# Patient Record
Sex: MALE | Race: WHITE | NOT HISPANIC OR LATINO | Employment: OTHER | URBAN - METROPOLITAN AREA
[De-identification: names, ages, dates, MRNs, and addresses within clinical notes are randomized per-mention and may not be internally consistent; named-entity substitution may affect disease eponyms.]

---

## 2017-01-01 ENCOUNTER — HOSPITAL ENCOUNTER (INPATIENT)
Facility: HOSPITAL | Age: 82
LOS: 1 days | Discharge: HOME | DRG: 388 | End: 2017-05-05
Attending: EMERGENCY MEDICINE | Admitting: SURGERY
Payer: MEDICARE

## 2017-01-01 ENCOUNTER — HOSPITAL ENCOUNTER (EMERGENCY)
Facility: HOSPITAL | Age: 82
Discharge: HOME | End: 2017-07-21
Attending: EMERGENCY MEDICINE
Payer: MEDICARE

## 2017-01-01 ENCOUNTER — PATIENT OUTREACH (OUTPATIENT)
Dept: ADMINISTRATIVE | Facility: CLINIC | Age: 82
End: 2017-01-01

## 2017-01-01 ENCOUNTER — HOSPITAL ENCOUNTER (INPATIENT)
Facility: HOSPITAL | Age: 82
LOS: 2 days | Discharge: HOME HEALTH | DRG: 200 | End: 2017-12-10
Attending: EMERGENCY MEDICINE | Admitting: UROLOGY
Payer: MEDICARE

## 2017-01-01 ENCOUNTER — HOSPITAL ENCOUNTER (EMERGENCY)
Facility: HOSPITAL | Age: 82
Discharge: HOME | End: 2017-12-24
Attending: EMERGENCY MEDICINE
Payer: MEDICARE

## 2017-01-01 ENCOUNTER — HOSPITAL ENCOUNTER (INPATIENT)
Facility: HOSPITAL | Age: 82
LOS: 3 days | Discharge: HOME | DRG: 388 | End: 2017-12-20
Attending: EMERGENCY MEDICINE | Admitting: STUDENT IN AN ORGANIZED HEALTH CARE EDUCATION/TRAINING PROGRAM
Payer: MEDICARE

## 2017-01-01 ENCOUNTER — HOSPITAL ENCOUNTER (EMERGENCY)
Facility: HOSPITAL | Age: 82
Discharge: HOME | End: 2017-05-02
Attending: EMERGENCY MEDICINE
Payer: MEDICARE

## 2017-01-01 ENCOUNTER — HOSPITAL ENCOUNTER (INPATIENT)
Facility: HOSPITAL | Age: 82
LOS: 5 days | Discharge: HOME HEALTH | DRG: 389 | End: 2018-01-02
Attending: EMERGENCY MEDICINE | Admitting: STUDENT IN AN ORGANIZED HEALTH CARE EDUCATION/TRAINING PROGRAM
Payer: MEDICARE

## 2017-01-01 ENCOUNTER — HOSPITAL ENCOUNTER (INPATIENT)
Facility: HOSPITAL | Age: 82
LOS: 1 days | Discharge: HOME | DRG: 389 | End: 2017-12-12
Attending: EMERGENCY MEDICINE | Admitting: SURGERY
Payer: MEDICARE

## 2017-01-01 VITALS
HEART RATE: 65 BPM | OXYGEN SATURATION: 96 % | DIASTOLIC BLOOD PRESSURE: 53 MMHG | BODY MASS INDEX: 14.19 KG/M2 | WEIGHT: 90.38 LBS | RESPIRATION RATE: 14 BRPM | HEIGHT: 67 IN | TEMPERATURE: 99 F | SYSTOLIC BLOOD PRESSURE: 97 MMHG

## 2017-01-01 VITALS
SYSTOLIC BLOOD PRESSURE: 132 MMHG | OXYGEN SATURATION: 99 % | RESPIRATION RATE: 18 BRPM | HEIGHT: 66 IN | DIASTOLIC BLOOD PRESSURE: 68 MMHG | HEART RATE: 95 BPM | TEMPERATURE: 99 F | BODY MASS INDEX: 17.36 KG/M2 | WEIGHT: 108 LBS

## 2017-01-01 VITALS
SYSTOLIC BLOOD PRESSURE: 92 MMHG | HEART RATE: 96 BPM | RESPIRATION RATE: 18 BRPM | WEIGHT: 91 LBS | BODY MASS INDEX: 14.28 KG/M2 | DIASTOLIC BLOOD PRESSURE: 54 MMHG | OXYGEN SATURATION: 93 % | TEMPERATURE: 98 F | HEIGHT: 67 IN

## 2017-01-01 VITALS
WEIGHT: 92.31 LBS | DIASTOLIC BLOOD PRESSURE: 70 MMHG | SYSTOLIC BLOOD PRESSURE: 133 MMHG | OXYGEN SATURATION: 98 % | TEMPERATURE: 98 F | BODY MASS INDEX: 13.99 KG/M2 | HEART RATE: 85 BPM | HEIGHT: 68 IN | RESPIRATION RATE: 18 BRPM

## 2017-01-01 VITALS
WEIGHT: 108 LBS | BODY MASS INDEX: 16.95 KG/M2 | HEART RATE: 92 BPM | SYSTOLIC BLOOD PRESSURE: 97 MMHG | OXYGEN SATURATION: 92 % | RESPIRATION RATE: 16 BRPM | HEIGHT: 67 IN | DIASTOLIC BLOOD PRESSURE: 62 MMHG | TEMPERATURE: 99 F

## 2017-01-01 VITALS
RESPIRATION RATE: 20 BRPM | WEIGHT: 91 LBS | TEMPERATURE: 97 F | HEIGHT: 67 IN | SYSTOLIC BLOOD PRESSURE: 142 MMHG | HEART RATE: 71 BPM | DIASTOLIC BLOOD PRESSURE: 72 MMHG | OXYGEN SATURATION: 98 % | BODY MASS INDEX: 14.28 KG/M2

## 2017-01-01 VITALS
RESPIRATION RATE: 16 BRPM | WEIGHT: 89.94 LBS | OXYGEN SATURATION: 95 % | HEART RATE: 70 BPM | BODY MASS INDEX: 14.12 KG/M2 | TEMPERATURE: 98 F | DIASTOLIC BLOOD PRESSURE: 57 MMHG | SYSTOLIC BLOOD PRESSURE: 93 MMHG | HEIGHT: 67 IN

## 2017-01-01 DIAGNOSIS — E87.5 HYPERKALEMIA: ICD-10-CM

## 2017-01-01 DIAGNOSIS — J20.9 COPD (CHRONIC OBSTRUCTIVE PULMONARY DISEASE) WITH ACUTE BRONCHITIS: ICD-10-CM

## 2017-01-01 DIAGNOSIS — J93.81 CHRONIC PNEUMOTHORAX: ICD-10-CM

## 2017-01-01 DIAGNOSIS — R11.10 VOMITING, INTRACTABILITY OF VOMITING NOT SPECIFIED, PRESENCE OF NAUSEA NOT SPECIFIED, UNSPECIFIED VOMITING TYPE: ICD-10-CM

## 2017-01-01 DIAGNOSIS — E86.0 DEHYDRATION: Primary | ICD-10-CM

## 2017-01-01 DIAGNOSIS — K94.19 ALTERED BOWEL ELIMINATION DUE TO INTESTINAL OSTOMY: ICD-10-CM

## 2017-01-01 DIAGNOSIS — K56.609 SMALL BOWEL OBSTRUCTION: ICD-10-CM

## 2017-01-01 DIAGNOSIS — Z93.8 STATUS POST CHEST TUBE PLACEMENT: ICD-10-CM

## 2017-01-01 DIAGNOSIS — J44.0 COPD (CHRONIC OBSTRUCTIVE PULMONARY DISEASE) WITH ACUTE BRONCHITIS: ICD-10-CM

## 2017-01-01 DIAGNOSIS — E87.20 METABOLIC ACIDOSIS: ICD-10-CM

## 2017-01-01 DIAGNOSIS — R10.84 GENERALIZED ABDOMINAL PAIN: ICD-10-CM

## 2017-01-01 DIAGNOSIS — R62.7 FAILURE TO THRIVE IN ADULT: ICD-10-CM

## 2017-01-01 DIAGNOSIS — R10.9 ABDOMINAL PAIN, UNSPECIFIED ABDOMINAL LOCATION: ICD-10-CM

## 2017-01-01 DIAGNOSIS — J93.81 CHRONIC PNEUMOTHORAX: Primary | ICD-10-CM

## 2017-01-01 DIAGNOSIS — K56.609 SBO (SMALL BOWEL OBSTRUCTION): Primary | ICD-10-CM

## 2017-01-01 DIAGNOSIS — J93.11 PRIMARY SPONTANEOUS PNEUMOTHORAX: ICD-10-CM

## 2017-01-01 DIAGNOSIS — J93.9 PNEUMOTHORAX ON RIGHT: ICD-10-CM

## 2017-01-01 DIAGNOSIS — Z93.9 HISTORY OF CREATION OF OSTOMY: Primary | ICD-10-CM

## 2017-01-01 DIAGNOSIS — R10.9 ABDOMINAL PAIN: ICD-10-CM

## 2017-01-01 DIAGNOSIS — Z90.49 S/P TOTAL COLECTOMY: ICD-10-CM

## 2017-01-01 DIAGNOSIS — R53.81 PHYSICAL DECONDITIONING: ICD-10-CM

## 2017-01-01 DIAGNOSIS — Z01.89 ENCOUNTER FOR IMAGING STUDY TO CONFIRM NASOGASTRIC (NG) TUBE PLACEMENT: ICD-10-CM

## 2017-01-01 DIAGNOSIS — D64.9 ANEMIA, UNSPECIFIED TYPE: ICD-10-CM

## 2017-01-01 DIAGNOSIS — J93.9 PNEUMOTHORAX: ICD-10-CM

## 2017-01-01 DIAGNOSIS — R07.9 CHEST PAIN: ICD-10-CM

## 2017-01-01 DIAGNOSIS — R10.9 ABDOMINAL CRAMPING: Primary | ICD-10-CM

## 2017-01-01 DIAGNOSIS — J93.0 TENSION PNEUMOTHORAX: ICD-10-CM

## 2017-01-01 DIAGNOSIS — K56.601 COMPLETE INTESTINAL OBSTRUCTION, UNSPECIFIED CAUSE: Primary | ICD-10-CM

## 2017-01-01 DIAGNOSIS — Z43.2 ATTENTION TO ILEOSTOMY: Primary | ICD-10-CM

## 2017-01-01 DIAGNOSIS — R05.9 COUGH: ICD-10-CM

## 2017-01-01 DIAGNOSIS — R53.1 WEAKNESS: ICD-10-CM

## 2017-01-01 LAB
25(OH)D3+25(OH)D2 SERPL-MCNC: 26 NG/ML
ALBUMIN SERPL BCP-MCNC: 2.6 G/DL
ALBUMIN SERPL BCP-MCNC: 2.7 G/DL
ALBUMIN SERPL BCP-MCNC: 2.8 G/DL
ALBUMIN SERPL BCP-MCNC: 3 G/DL
ALBUMIN SERPL BCP-MCNC: 3.7 G/DL
ALBUMIN SERPL BCP-MCNC: 3.7 G/DL
ALBUMIN SERPL BCP-MCNC: 3.8 G/DL
ALBUMIN SERPL BCP-MCNC: 4 G/DL
ALBUMIN SERPL BCP-MCNC: 4.1 G/DL
ALP SERPL-CCNC: 101 U/L
ALP SERPL-CCNC: 103 U/L
ALP SERPL-CCNC: 104 U/L
ALP SERPL-CCNC: 66 U/L
ALP SERPL-CCNC: 71 U/L
ALP SERPL-CCNC: 74 U/L
ALP SERPL-CCNC: 81 U/L
ALP SERPL-CCNC: 92 U/L
ALP SERPL-CCNC: 92 U/L
ALT SERPL W/O P-5'-P-CCNC: 12 U/L
ALT SERPL W/O P-5'-P-CCNC: 15 U/L
ALT SERPL W/O P-5'-P-CCNC: 17 U/L
ALT SERPL W/O P-5'-P-CCNC: 18 U/L
ALT SERPL W/O P-5'-P-CCNC: 19 U/L
ALT SERPL W/O P-5'-P-CCNC: 19 U/L
ALT SERPL W/O P-5'-P-CCNC: 20 U/L
ALT SERPL W/O P-5'-P-CCNC: 20 U/L
ALT SERPL W/O P-5'-P-CCNC: 35 U/L
ANION GAP SERPL CALC-SCNC: 10 MMOL/L
ANION GAP SERPL CALC-SCNC: 10 MMOL/L
ANION GAP SERPL CALC-SCNC: 12 MMOL/L
ANION GAP SERPL CALC-SCNC: 14 MMOL/L
ANION GAP SERPL CALC-SCNC: 17 MMOL/L
ANION GAP SERPL CALC-SCNC: 4 MMOL/L
ANION GAP SERPL CALC-SCNC: 4 MMOL/L
ANION GAP SERPL CALC-SCNC: 6 MMOL/L
ANION GAP SERPL CALC-SCNC: 7 MMOL/L
ANION GAP SERPL CALC-SCNC: 7 MMOL/L
ANION GAP SERPL CALC-SCNC: 8 MMOL/L
ANION GAP SERPL CALC-SCNC: 9 MMOL/L
AST SERPL-CCNC: 19 U/L
AST SERPL-CCNC: 19 U/L
AST SERPL-CCNC: 21 U/L
AST SERPL-CCNC: 23 U/L
AST SERPL-CCNC: 24 U/L
AST SERPL-CCNC: 27 U/L
AST SERPL-CCNC: 31 U/L
AST SERPL-CCNC: 32 U/L
AST SERPL-CCNC: 47 U/L
BACTERIA UR CULT: NORMAL
BACTERIA UR CULT: NORMAL
BASOPHILS # BLD AUTO: 0.01 K/UL
BASOPHILS # BLD AUTO: 0.02 K/UL
BASOPHILS # BLD AUTO: 0.03 K/UL
BASOPHILS # BLD AUTO: 0.04 K/UL
BASOPHILS NFR BLD: 0.1 %
BASOPHILS NFR BLD: 0.2 %
BASOPHILS NFR BLD: 0.3 %
BASOPHILS NFR BLD: 0.4 %
BASOPHILS NFR BLD: 0.4 %
BASOPHILS NFR BLD: 0.5 %
BILIRUB SERPL-MCNC: 0.3 MG/DL
BILIRUB SERPL-MCNC: 0.5 MG/DL
BILIRUB SERPL-MCNC: 0.6 MG/DL
BILIRUB SERPL-MCNC: 1 MG/DL
BILIRUB SERPL-MCNC: 1.1 MG/DL
BILIRUB UR QL STRIP: NEGATIVE
BNP SERPL-MCNC: 126 PG/ML
BUN SERPL-MCNC: 13 MG/DL
BUN SERPL-MCNC: 13 MG/DL
BUN SERPL-MCNC: 17 MG/DL
BUN SERPL-MCNC: 18 MG/DL
BUN SERPL-MCNC: 20 MG/DL
BUN SERPL-MCNC: 21 MG/DL
BUN SERPL-MCNC: 21 MG/DL
BUN SERPL-MCNC: 23 MG/DL
BUN SERPL-MCNC: 24 MG/DL
BUN SERPL-MCNC: 26 MG/DL
BUN SERPL-MCNC: 28 MG/DL
BUN SERPL-MCNC: 30 MG/DL
BUN SERPL-MCNC: 31 MG/DL
BUN SERPL-MCNC: 36 MG/DL
CALCIUM SERPL-MCNC: 10 MG/DL
CALCIUM SERPL-MCNC: 8.1 MG/DL
CALCIUM SERPL-MCNC: 8.2 MG/DL
CALCIUM SERPL-MCNC: 8.2 MG/DL
CALCIUM SERPL-MCNC: 8.3 MG/DL
CALCIUM SERPL-MCNC: 8.5 MG/DL
CALCIUM SERPL-MCNC: 8.6 MG/DL
CALCIUM SERPL-MCNC: 8.7 MG/DL
CALCIUM SERPL-MCNC: 8.8 MG/DL
CALCIUM SERPL-MCNC: 9.1 MG/DL
CALCIUM SERPL-MCNC: 9.2 MG/DL
CALCIUM SERPL-MCNC: 9.2 MG/DL
CALCIUM SERPL-MCNC: 9.7 MG/DL
CALCIUM SERPL-MCNC: 9.7 MG/DL
CALCIUM SERPL-MCNC: 9.8 MG/DL
CALCIUM SERPL-MCNC: 9.8 MG/DL
CALCIUM SERPL-MCNC: 9.9 MG/DL
CHLORIDE SERPL-SCNC: 100 MMOL/L
CHLORIDE SERPL-SCNC: 101 MMOL/L
CHLORIDE SERPL-SCNC: 102 MMOL/L
CHLORIDE SERPL-SCNC: 103 MMOL/L
CHLORIDE SERPL-SCNC: 105 MMOL/L
CHLORIDE SERPL-SCNC: 105 MMOL/L
CHLORIDE SERPL-SCNC: 106 MMOL/L
CHLORIDE SERPL-SCNC: 106 MMOL/L
CHLORIDE SERPL-SCNC: 108 MMOL/L
CHLORIDE SERPL-SCNC: 108 MMOL/L
CHLORIDE SERPL-SCNC: 109 MMOL/L
CHLORIDE SERPL-SCNC: 115 MMOL/L
CHLORIDE SERPL-SCNC: 115 MMOL/L
CHLORIDE SERPL-SCNC: 95 MMOL/L
CHLORIDE SERPL-SCNC: 95 MMOL/L
CHLORIDE SERPL-SCNC: 97 MMOL/L
CK SERPL-CCNC: 164 U/L
CLARITY UR: CLEAR
CO2 SERPL-SCNC: 13 MMOL/L
CO2 SERPL-SCNC: 16 MMOL/L
CO2 SERPL-SCNC: 16 MMOL/L
CO2 SERPL-SCNC: 17 MMOL/L
CO2 SERPL-SCNC: 19 MMOL/L
CO2 SERPL-SCNC: 20 MMOL/L
CO2 SERPL-SCNC: 21 MMOL/L
CO2 SERPL-SCNC: 21 MMOL/L
CO2 SERPL-SCNC: 23 MMOL/L
CO2 SERPL-SCNC: 23 MMOL/L
CO2 SERPL-SCNC: 24 MMOL/L
CO2 SERPL-SCNC: 27 MMOL/L
CO2 SERPL-SCNC: 28 MMOL/L
COLOR UR: YELLOW
CREAT SERPL-MCNC: 0.7 MG/DL
CREAT SERPL-MCNC: 0.8 MG/DL
CREAT SERPL-MCNC: 0.9 MG/DL
CREAT SERPL-MCNC: 1 MG/DL
CREAT SERPL-MCNC: 1.1 MG/DL
CREAT SERPL-MCNC: 1.2 MG/DL
CREAT SERPL-MCNC: 1.2 MG/DL
CREAT SERPL-MCNC: 1.3 MG/DL
CREAT SERPL-MCNC: 1.4 MG/DL
CREAT SERPL-MCNC: 1.4 MG/DL
DIFFERENTIAL METHOD: ABNORMAL
EOSINOPHIL # BLD AUTO: 0 K/UL
EOSINOPHIL # BLD AUTO: 0.1 K/UL
EOSINOPHIL # BLD AUTO: 0.2 K/UL
EOSINOPHIL NFR BLD: 0 %
EOSINOPHIL NFR BLD: 0.1 %
EOSINOPHIL NFR BLD: 0.1 %
EOSINOPHIL NFR BLD: 0.2 %
EOSINOPHIL NFR BLD: 0.8 %
EOSINOPHIL NFR BLD: 1.4 %
EOSINOPHIL NFR BLD: 1.5 %
ERYTHROCYTE [DISTWIDTH] IN BLOOD BY AUTOMATED COUNT: 13 %
ERYTHROCYTE [DISTWIDTH] IN BLOOD BY AUTOMATED COUNT: 13.1 %
ERYTHROCYTE [DISTWIDTH] IN BLOOD BY AUTOMATED COUNT: 13.1 %
ERYTHROCYTE [DISTWIDTH] IN BLOOD BY AUTOMATED COUNT: 13.2 %
ERYTHROCYTE [DISTWIDTH] IN BLOOD BY AUTOMATED COUNT: 13.6 %
ERYTHROCYTE [DISTWIDTH] IN BLOOD BY AUTOMATED COUNT: 13.7 %
ERYTHROCYTE [DISTWIDTH] IN BLOOD BY AUTOMATED COUNT: 13.8 %
EST. GFR  (AFRICAN AMERICAN): 54 ML/MIN/1.73 M^2
EST. GFR  (AFRICAN AMERICAN): 54.9 ML/MIN/1.73 M^2
EST. GFR  (AFRICAN AMERICAN): 60 ML/MIN/1.73 M^2
EST. GFR  (AFRICAN AMERICAN): >60 ML/MIN/1.73 M^2
EST. GFR  (NON AFRICAN AMERICAN): 47 ML/MIN/1.73 M^2
EST. GFR  (NON AFRICAN AMERICAN): 47.4 ML/MIN/1.73 M^2
EST. GFR  (NON AFRICAN AMERICAN): 52 ML/MIN/1.73 M^2
EST. GFR  (NON AFRICAN AMERICAN): 57 ML/MIN/1.73 M^2
EST. GFR  (NON AFRICAN AMERICAN): 57.2 ML/MIN/1.73 M^2
EST. GFR  (NON AFRICAN AMERICAN): >60 ML/MIN/1.73 M^2
FERRITIN SERPL-MCNC: 193 NG/ML
FOLATE SERPL-MCNC: 15.3 NG/ML
GLUCOSE SERPL-MCNC: 100 MG/DL
GLUCOSE SERPL-MCNC: 100 MG/DL
GLUCOSE SERPL-MCNC: 101 MG/DL
GLUCOSE SERPL-MCNC: 106 MG/DL
GLUCOSE SERPL-MCNC: 109 MG/DL
GLUCOSE SERPL-MCNC: 109 MG/DL
GLUCOSE SERPL-MCNC: 113 MG/DL
GLUCOSE SERPL-MCNC: 118 MG/DL
GLUCOSE SERPL-MCNC: 123 MG/DL
GLUCOSE SERPL-MCNC: 129 MG/DL
GLUCOSE SERPL-MCNC: 139 MG/DL
GLUCOSE SERPL-MCNC: 164 MG/DL
GLUCOSE SERPL-MCNC: 184 MG/DL
GLUCOSE SERPL-MCNC: 83 MG/DL
GLUCOSE SERPL-MCNC: 87 MG/DL
GLUCOSE SERPL-MCNC: 89 MG/DL
GLUCOSE SERPL-MCNC: 90 MG/DL
GLUCOSE SERPL-MCNC: 93 MG/DL
GLUCOSE SERPL-MCNC: 97 MG/DL
GLUCOSE UR QL STRIP: NEGATIVE
HCT VFR BLD AUTO: 30.4 %
HCT VFR BLD AUTO: 31.1 %
HCT VFR BLD AUTO: 32.9 %
HCT VFR BLD AUTO: 33.2 %
HCT VFR BLD AUTO: 33.9 %
HCT VFR BLD AUTO: 34.3 %
HCT VFR BLD AUTO: 34.4 %
HCT VFR BLD AUTO: 36.6 %
HCT VFR BLD AUTO: 37.2 %
HCT VFR BLD AUTO: 38 %
HCT VFR BLD AUTO: 38.8 %
HCT VFR BLD AUTO: 39.8 %
HCT VFR BLD AUTO: 41.1 %
HCT VFR BLD AUTO: 41.2 %
HGB BLD-MCNC: 10.3 G/DL
HGB BLD-MCNC: 10.8 G/DL
HGB BLD-MCNC: 11.1 G/DL
HGB BLD-MCNC: 11.1 G/DL
HGB BLD-MCNC: 11.3 G/DL
HGB BLD-MCNC: 11.5 G/DL
HGB BLD-MCNC: 12.2 G/DL
HGB BLD-MCNC: 12.9 G/DL
HGB BLD-MCNC: 13.1 G/DL
HGB BLD-MCNC: 13.1 G/DL
HGB BLD-MCNC: 13.5 G/DL
HGB BLD-MCNC: 13.7 G/DL
HGB BLD-MCNC: 13.8 G/DL
HGB BLD-MCNC: 9.8 G/DL
HGB UR QL STRIP: ABNORMAL
HGB UR QL STRIP: ABNORMAL
HGB UR QL STRIP: NEGATIVE
INR PPP: 1
KETONES UR QL STRIP: ABNORMAL
KETONES UR QL STRIP: NEGATIVE
KETONES UR QL STRIP: NEGATIVE
LACTATE SERPL-SCNC: 1.3 MMOL/L
LACTATE SERPL-SCNC: 2.2 MMOL/L
LEUKOCYTE ESTERASE UR QL STRIP: NEGATIVE
LIPASE SERPL-CCNC: 13 U/L
LIPASE SERPL-CCNC: 19 U/L
LIPASE SERPL-CCNC: 27 U/L
LYMPHOCYTES # BLD AUTO: 1 K/UL
LYMPHOCYTES # BLD AUTO: 1.3 K/UL
LYMPHOCYTES # BLD AUTO: 1.6 K/UL
LYMPHOCYTES # BLD AUTO: 1.9 K/UL
LYMPHOCYTES # BLD AUTO: 2 K/UL
LYMPHOCYTES # BLD AUTO: 2 K/UL
LYMPHOCYTES # BLD AUTO: 2.5 K/UL
LYMPHOCYTES # BLD AUTO: 2.6 K/UL
LYMPHOCYTES # BLD AUTO: 2.7 K/UL
LYMPHOCYTES # BLD AUTO: 2.8 K/UL
LYMPHOCYTES # BLD AUTO: 3.1 K/UL
LYMPHOCYTES # BLD AUTO: 3.4 K/UL
LYMPHOCYTES NFR BLD: 10.4 %
LYMPHOCYTES NFR BLD: 10.8 %
LYMPHOCYTES NFR BLD: 20.6 %
LYMPHOCYTES NFR BLD: 23.5 %
LYMPHOCYTES NFR BLD: 23.5 %
LYMPHOCYTES NFR BLD: 25.5 %
LYMPHOCYTES NFR BLD: 27 %
LYMPHOCYTES NFR BLD: 27.4 %
LYMPHOCYTES NFR BLD: 27.5 %
LYMPHOCYTES NFR BLD: 30.4 %
LYMPHOCYTES NFR BLD: 5.8 %
LYMPHOCYTES NFR BLD: 7.2 %
MAGNESIUM SERPL-MCNC: 1.7 MG/DL
MAGNESIUM SERPL-MCNC: 1.8 MG/DL
MAGNESIUM SERPL-MCNC: 1.9 MG/DL
MAGNESIUM SERPL-MCNC: 2.1 MG/DL
MCH RBC QN AUTO: 30.5 PG
MCH RBC QN AUTO: 30.9 PG
MCH RBC QN AUTO: 31 PG
MCH RBC QN AUTO: 31.2 PG
MCH RBC QN AUTO: 31.2 PG
MCH RBC QN AUTO: 31.3 PG
MCH RBC QN AUTO: 31.4 PG
MCH RBC QN AUTO: 31.4 PG
MCH RBC QN AUTO: 31.5 PG
MCH RBC QN AUTO: 31.6 PG
MCH RBC QN AUTO: 31.7 PG
MCH RBC QN AUTO: 32.2 PG
MCHC RBC AUTO-ENTMCNC: 32.2 G/DL
MCHC RBC AUTO-ENTMCNC: 32.7 G/DL
MCHC RBC AUTO-ENTMCNC: 32.8 G/DL
MCHC RBC AUTO-ENTMCNC: 32.9 G/DL
MCHC RBC AUTO-ENTMCNC: 33.1 G/DL
MCHC RBC AUTO-ENTMCNC: 33.3 G/DL
MCHC RBC AUTO-ENTMCNC: 33.3 G/DL
MCHC RBC AUTO-ENTMCNC: 33.4 %
MCHC RBC AUTO-ENTMCNC: 33.4 G/DL
MCHC RBC AUTO-ENTMCNC: 33.5 G/DL
MCHC RBC AUTO-ENTMCNC: 33.8 G/DL
MCHC RBC AUTO-ENTMCNC: 33.9 G/DL
MCHC RBC AUTO-ENTMCNC: 33.9 G/DL
MCHC RBC AUTO-ENTMCNC: 35.2 %
MCV RBC AUTO: 91 FL
MCV RBC AUTO: 93 FL
MCV RBC AUTO: 94 FL
MCV RBC AUTO: 95 FL
MONOCYTES # BLD AUTO: 0.8 K/UL
MONOCYTES # BLD AUTO: 0.9 K/UL
MONOCYTES # BLD AUTO: 1 K/UL
MONOCYTES # BLD AUTO: 1.1 K/UL
MONOCYTES # BLD AUTO: 1.2 K/UL
MONOCYTES # BLD AUTO: 1.3 K/UL
MONOCYTES # BLD AUTO: 1.5 K/UL
MONOCYTES NFR BLD: 10.4 %
MONOCYTES NFR BLD: 10.9 %
MONOCYTES NFR BLD: 13.5 %
MONOCYTES NFR BLD: 4.4 %
MONOCYTES NFR BLD: 6.3 %
MONOCYTES NFR BLD: 6.8 %
MONOCYTES NFR BLD: 7.3 %
MONOCYTES NFR BLD: 8.4 %
MONOCYTES NFR BLD: 8.4 %
MONOCYTES NFR BLD: 9 %
MONOCYTES NFR BLD: 9.2 %
MONOCYTES NFR BLD: 9.8 %
NEUTROPHILS # BLD AUTO: 15.7 K/UL
NEUTROPHILS # BLD AUTO: 16.2 K/UL
NEUTROPHILS # BLD AUTO: 19 K/UL
NEUTROPHILS # BLD AUTO: 5.3 K/UL
NEUTROPHILS # BLD AUTO: 5.4 K/UL
NEUTROPHILS # BLD AUTO: 5.8 K/UL
NEUTROPHILS # BLD AUTO: 6.2 K/UL
NEUTROPHILS # BLD AUTO: 6.3 K/UL
NEUTROPHILS # BLD AUTO: 7 K/UL
NEUTROPHILS # BLD AUTO: 7.5 K/UL
NEUTROPHILS # BLD AUTO: 7.8 K/UL
NEUTROPHILS # BLD AUTO: 9.9 K/UL
NEUTROPHILS NFR BLD: 57.7 %
NEUTROPHILS NFR BLD: 60.2 %
NEUTROPHILS NFR BLD: 61.5 %
NEUTROPHILS NFR BLD: 62.4 %
NEUTROPHILS NFR BLD: 63.5 %
NEUTROPHILS NFR BLD: 65.3 %
NEUTROPHILS NFR BLD: 65.6 %
NEUTROPHILS NFR BLD: 65.8 %
NEUTROPHILS NFR BLD: 81.5 %
NEUTROPHILS NFR BLD: 82.9 %
NEUTROPHILS NFR BLD: 85.5 %
NEUTROPHILS NFR BLD: 89.3 %
NITRITE UR QL STRIP: NEGATIVE
OSMOLALITY UR: 634 MOSM/KG
PH UR STRIP: 5 [PH] (ref 5–8)
PH UR STRIP: 6 [PH] (ref 5–8)
PH UR STRIP: 6 [PH] (ref 5–8)
PHOSPHATE SERPL-MCNC: 2.8 MG/DL
PHOSPHATE SERPL-MCNC: 3.1 MG/DL
PHOSPHATE SERPL-MCNC: 3.3 MG/DL
PHOSPHATE SERPL-MCNC: 3.4 MG/DL
PHOSPHATE SERPL-MCNC: 3.9 MG/DL
PHOSPHATE SERPL-MCNC: 4 MG/DL
PLATELET # BLD AUTO: 235 K/UL
PLATELET # BLD AUTO: 257 K/UL
PLATELET # BLD AUTO: 268 K/UL
PLATELET # BLD AUTO: 277 K/UL
PLATELET # BLD AUTO: 284 K/UL
PLATELET # BLD AUTO: 291 K/UL
PLATELET # BLD AUTO: 292 K/UL
PLATELET # BLD AUTO: 299 K/UL
PLATELET # BLD AUTO: 326 K/UL
PLATELET # BLD AUTO: 338 K/UL
PLATELET # BLD AUTO: 357 K/UL
PLATELET # BLD AUTO: 358 K/UL
PLATELET # BLD AUTO: 385 K/UL
PLATELET # BLD AUTO: 394 K/UL
PMV BLD AUTO: 10.1 FL
PMV BLD AUTO: 10.1 FL
PMV BLD AUTO: 10.4 FL
PMV BLD AUTO: 10.5 FL
PMV BLD AUTO: 10.8 FL
PMV BLD AUTO: 10.8 FL
PMV BLD AUTO: 11 FL
PMV BLD AUTO: 11.1 FL
PMV BLD AUTO: 11.2 FL
PMV BLD AUTO: 11.2 FL
POCT GLUCOSE: 149 MG/DL (ref 70–110)
POCT GLUCOSE: 84 MG/DL (ref 70–110)
POTASSIUM SERPL-SCNC: 3.6 MMOL/L
POTASSIUM SERPL-SCNC: 3.7 MMOL/L
POTASSIUM SERPL-SCNC: 3.8 MMOL/L
POTASSIUM SERPL-SCNC: 3.9 MMOL/L
POTASSIUM SERPL-SCNC: 4.1 MMOL/L
POTASSIUM SERPL-SCNC: 4.3 MMOL/L
POTASSIUM SERPL-SCNC: 4.3 MMOL/L
POTASSIUM SERPL-SCNC: 4.4 MMOL/L
POTASSIUM SERPL-SCNC: 4.5 MMOL/L
POTASSIUM SERPL-SCNC: 5.3 MMOL/L
POTASSIUM SERPL-SCNC: 5.4 MMOL/L
PREALB SERPL-MCNC: 11 MG/DL
PREALB SERPL-MCNC: 11 MG/DL
PREALB SERPL-MCNC: 14 MG/DL
PROT SERPL-MCNC: 5.2 G/DL
PROT SERPL-MCNC: 5.8 G/DL
PROT SERPL-MCNC: 6.1 G/DL
PROT SERPL-MCNC: 6.5 G/DL
PROT SERPL-MCNC: 7.7 G/DL
PROT SERPL-MCNC: 7.7 G/DL
PROT SERPL-MCNC: 7.8 G/DL
PROT SERPL-MCNC: 8.3 G/DL
PROT SERPL-MCNC: 8.5 G/DL
PROT UR QL STRIP: ABNORMAL
PROT UR QL STRIP: NEGATIVE
PROT UR QL STRIP: NEGATIVE
PROTHROMBIN TIME: 10.8 SEC
RBC # BLD AUTO: 3.21 M/UL
RBC # BLD AUTO: 3.28 M/UL
RBC # BLD AUTO: 3.45 M/UL
RBC # BLD AUTO: 3.56 M/UL
RBC # BLD AUTO: 3.56 M/UL
RBC # BLD AUTO: 3.66 M/UL
RBC # BLD AUTO: 3.71 M/UL
RBC # BLD AUTO: 3.87 M/UL
RBC # BLD AUTO: 4.07 M/UL
RBC # BLD AUTO: 4.11 M/UL
RBC # BLD AUTO: 4.14 M/UL
RBC # BLD AUTO: 4.26 M/UL
RBC # BLD AUTO: 4.33 M/UL
RBC # BLD AUTO: 4.37 M/UL
SODIUM SERPL-SCNC: 123 MMOL/L
SODIUM SERPL-SCNC: 126 MMOL/L
SODIUM SERPL-SCNC: 127 MMOL/L
SODIUM SERPL-SCNC: 128 MMOL/L
SODIUM SERPL-SCNC: 131 MMOL/L
SODIUM SERPL-SCNC: 133 MMOL/L
SODIUM SERPL-SCNC: 133 MMOL/L
SODIUM SERPL-SCNC: 136 MMOL/L
SODIUM SERPL-SCNC: 137 MMOL/L
SODIUM SERPL-SCNC: 137 MMOL/L
SODIUM SERPL-SCNC: 138 MMOL/L
SODIUM SERPL-SCNC: 138 MMOL/L
SODIUM SERPL-SCNC: 139 MMOL/L
SODIUM SERPL-SCNC: 143 MMOL/L
SODIUM SERPL-SCNC: 145 MMOL/L
SP GR UR STRIP: 1.01 (ref 1–1.03)
SP GR UR STRIP: >=1.03 (ref 1–1.03)
SP GR UR STRIP: >=1.03 (ref 1–1.03)
TRIGL SERPL-MCNC: 46 MG/DL
TROPONIN I SERPL DL<=0.01 NG/ML-MCNC: 0.02 NG/ML
TSH SERPL DL<=0.005 MIU/L-ACNC: 0.59 UIU/ML
URN SPEC COLLECT METH UR: ABNORMAL
URN SPEC COLLECT METH UR: ABNORMAL
URN SPEC COLLECT METH UR: NORMAL
UROBILINOGEN UR STRIP-ACNC: NEGATIVE EU/DL
VIT B12 SERPL-MCNC: 569 PG/ML
WBC # BLD AUTO: 10.13 K/UL
WBC # BLD AUTO: 10.62 K/UL
WBC # BLD AUTO: 11.6 K/UL
WBC # BLD AUTO: 11.95 K/UL
WBC # BLD AUTO: 12.13 K/UL
WBC # BLD AUTO: 12.18 K/UL
WBC # BLD AUTO: 17.53 K/UL
WBC # BLD AUTO: 19.05 K/UL
WBC # BLD AUTO: 19.55 K/UL
WBC # BLD AUTO: 22.21 K/UL
WBC # BLD AUTO: 8.56 K/UL
WBC # BLD AUTO: 9.19 K/UL
WBC # BLD AUTO: 9.43 K/UL
WBC # BLD AUTO: 9.6 K/UL

## 2017-01-01 PROCEDURE — 25000003 PHARM REV CODE 250: Performed by: SURGERY

## 2017-01-01 PROCEDURE — 99232 SBSQ HOSP IP/OBS MODERATE 35: CPT | Mod: ,,, | Performed by: SURGERY

## 2017-01-01 PROCEDURE — 11000001 HC ACUTE MED/SURG PRIVATE ROOM

## 2017-01-01 PROCEDURE — 99283 EMERGENCY DEPT VISIT LOW MDM: CPT

## 2017-01-01 PROCEDURE — 85025 COMPLETE CBC W/AUTO DIFF WBC: CPT

## 2017-01-01 PROCEDURE — 85025 COMPLETE CBC W/AUTO DIFF WBC: CPT | Mod: 91

## 2017-01-01 PROCEDURE — 36415 COLL VENOUS BLD VENIPUNCTURE: CPT

## 2017-01-01 PROCEDURE — 80053 COMPREHEN METABOLIC PANEL: CPT

## 2017-01-01 PROCEDURE — 63600175 PHARM REV CODE 636 W HCPCS: Performed by: SURGERY

## 2017-01-01 PROCEDURE — 96361 HYDRATE IV INFUSION ADD-ON: CPT

## 2017-01-01 PROCEDURE — S0028 INJECTION, FAMOTIDINE, 20 MG: HCPCS | Performed by: EMERGENCY MEDICINE

## 2017-01-01 PROCEDURE — 99292 CRITICAL CARE ADDL 30 MIN: CPT

## 2017-01-01 PROCEDURE — 96375 TX/PRO/DX INJ NEW DRUG ADDON: CPT

## 2017-01-01 PROCEDURE — 83880 ASSAY OF NATRIURETIC PEPTIDE: CPT

## 2017-01-01 PROCEDURE — 25000003 PHARM REV CODE 250: Performed by: STUDENT IN AN ORGANIZED HEALTH CARE EDUCATION/TRAINING PROGRAM

## 2017-01-01 PROCEDURE — 94761 N-INVAS EAR/PLS OXIMETRY MLT: CPT

## 2017-01-01 PROCEDURE — 93010 ELECTROCARDIOGRAM REPORT: CPT | Mod: ,,, | Performed by: INTERNAL MEDICINE

## 2017-01-01 PROCEDURE — 93005 ELECTROCARDIOGRAM TRACING: CPT

## 2017-01-01 PROCEDURE — 80048 BASIC METABOLIC PNL TOTAL CA: CPT

## 2017-01-01 PROCEDURE — A4216 STERILE WATER/SALINE, 10 ML: HCPCS | Performed by: STUDENT IN AN ORGANIZED HEALTH CARE EDUCATION/TRAINING PROGRAM

## 2017-01-01 PROCEDURE — 96372 THER/PROPH/DIAG INJ SC/IM: CPT

## 2017-01-01 PROCEDURE — 25000003 PHARM REV CODE 250: Performed by: INTERNAL MEDICINE

## 2017-01-01 PROCEDURE — 83605 ASSAY OF LACTIC ACID: CPT

## 2017-01-01 PROCEDURE — 25000003 PHARM REV CODE 250: Performed by: EMERGENCY MEDICINE

## 2017-01-01 PROCEDURE — 63600175 PHARM REV CODE 636 W HCPCS: Performed by: EMERGENCY MEDICINE

## 2017-01-01 PROCEDURE — 84484 ASSAY OF TROPONIN QUANT: CPT

## 2017-01-01 PROCEDURE — 27000221 HC OXYGEN, UP TO 24 HOURS

## 2017-01-01 PROCEDURE — 99284 EMERGENCY DEPT VISIT MOD MDM: CPT | Mod: 25

## 2017-01-01 PROCEDURE — 3E0336Z INTRODUCTION OF NUTRITIONAL SUBSTANCE INTO PERIPHERAL VEIN, PERCUTANEOUS APPROACH: ICD-10-PCS | Performed by: STUDENT IN AN ORGANIZED HEALTH CARE EDUCATION/TRAINING PROGRAM

## 2017-01-01 PROCEDURE — 99223 1ST HOSP IP/OBS HIGH 75: CPT | Mod: AI,GC,, | Performed by: SURGERY

## 2017-01-01 PROCEDURE — C9113 INJ PANTOPRAZOLE SODIUM, VIA: HCPCS | Performed by: EMERGENCY MEDICINE

## 2017-01-01 PROCEDURE — 81003 URINALYSIS AUTO W/O SCOPE: CPT

## 2017-01-01 PROCEDURE — 84100 ASSAY OF PHOSPHORUS: CPT

## 2017-01-01 PROCEDURE — 99222 1ST HOSP IP/OBS MODERATE 55: CPT | Mod: ,,, | Performed by: SURGERY

## 2017-01-01 PROCEDURE — 25000003 PHARM REV CODE 250

## 2017-01-01 PROCEDURE — 63600175 PHARM REV CODE 636 W HCPCS: Performed by: RADIOLOGY

## 2017-01-01 PROCEDURE — 02HV33Z INSERTION OF INFUSION DEVICE INTO SUPERIOR VENA CAVA, PERCUTANEOUS APPROACH: ICD-10-PCS | Performed by: STUDENT IN AN ORGANIZED HEALTH CARE EDUCATION/TRAINING PROGRAM

## 2017-01-01 PROCEDURE — 0W9930Z DRAINAGE OF RIGHT PLEURAL CAVITY WITH DRAINAGE DEVICE, PERCUTANEOUS APPROACH: ICD-10-PCS | Performed by: EMERGENCY MEDICINE

## 2017-01-01 PROCEDURE — 99900035 HC TECH TIME PER 15 MIN (STAT)

## 2017-01-01 PROCEDURE — 82607 VITAMIN B-12: CPT

## 2017-01-01 PROCEDURE — 99285 EMERGENCY DEPT VISIT HI MDM: CPT | Mod: 25

## 2017-01-01 PROCEDURE — 0W9930Z DRAINAGE OF RIGHT PLEURAL CAVITY WITH DRAINAGE DEVICE, PERCUTANEOUS APPROACH: ICD-10-PCS | Performed by: RADIOLOGY

## 2017-01-01 PROCEDURE — 96366 THER/PROPH/DIAG IV INF ADDON: CPT

## 2017-01-01 PROCEDURE — 96374 THER/PROPH/DIAG INJ IV PUSH: CPT

## 2017-01-01 PROCEDURE — 25000003 PHARM REV CODE 250: Performed by: THORACIC SURGERY (CARDIOTHORACIC VASCULAR SURGERY)

## 2017-01-01 PROCEDURE — 83735 ASSAY OF MAGNESIUM: CPT

## 2017-01-01 PROCEDURE — 99291 CRITICAL CARE FIRST HOUR: CPT | Mod: 25

## 2017-01-01 PROCEDURE — 84134 ASSAY OF PREALBUMIN: CPT

## 2017-01-01 PROCEDURE — 82550 ASSAY OF CK (CPK): CPT

## 2017-01-01 PROCEDURE — 99285 EMERGENCY DEPT VISIT HI MDM: CPT | Mod: ,,, | Performed by: PHYSICIAN ASSISTANT

## 2017-01-01 PROCEDURE — 63600175 PHARM REV CODE 636 W HCPCS: Performed by: STUDENT IN AN ORGANIZED HEALTH CARE EDUCATION/TRAINING PROGRAM

## 2017-01-01 PROCEDURE — 63600175 PHARM REV CODE 636 W HCPCS

## 2017-01-01 PROCEDURE — 25000003 PHARM REV CODE 250: Performed by: PHYSICIAN ASSISTANT

## 2017-01-01 PROCEDURE — C9113 INJ PANTOPRAZOLE SODIUM, VIA: HCPCS | Performed by: SURGERY

## 2017-01-01 PROCEDURE — 25000003 PHARM REV CODE 250: Performed by: HOSPITALIST

## 2017-01-01 PROCEDURE — 25500020 PHARM REV CODE 255: Performed by: EMERGENCY MEDICINE

## 2017-01-01 PROCEDURE — 85610 PROTHROMBIN TIME: CPT

## 2017-01-01 PROCEDURE — 83690 ASSAY OF LIPASE: CPT

## 2017-01-01 PROCEDURE — 87086 URINE CULTURE/COLONY COUNT: CPT

## 2017-01-01 PROCEDURE — 84478 ASSAY OF TRIGLYCERIDES: CPT

## 2017-01-01 PROCEDURE — 97802 MEDICAL NUTRITION INDIV IN: CPT

## 2017-01-01 PROCEDURE — 96376 TX/PRO/DX INJ SAME DRUG ADON: CPT

## 2017-01-01 PROCEDURE — 82306 VITAMIN D 25 HYDROXY: CPT

## 2017-01-01 PROCEDURE — 85027 COMPLETE CBC AUTOMATED: CPT

## 2017-01-01 PROCEDURE — 82728 ASSAY OF FERRITIN: CPT

## 2017-01-01 PROCEDURE — 99223 1ST HOSP IP/OBS HIGH 75: CPT | Mod: ,,, | Performed by: STUDENT IN AN ORGANIZED HEALTH CARE EDUCATION/TRAINING PROGRAM

## 2017-01-01 PROCEDURE — 32551 INSERTION OF CHEST TUBE: CPT

## 2017-01-01 PROCEDURE — 96365 THER/PROPH/DIAG IV INF INIT: CPT

## 2017-01-01 PROCEDURE — 99232 SBSQ HOSP IP/OBS MODERATE 35: CPT | Mod: ,,, | Performed by: STUDENT IN AN ORGANIZED HEALTH CARE EDUCATION/TRAINING PROGRAM

## 2017-01-01 PROCEDURE — 99285 EMERGENCY DEPT VISIT HI MDM: CPT

## 2017-01-01 PROCEDURE — 96360 HYDRATION IV INFUSION INIT: CPT

## 2017-01-01 PROCEDURE — S5010 5% DEXTROSE AND 0.45% SALINE: HCPCS | Performed by: INTERNAL MEDICINE

## 2017-01-01 PROCEDURE — B4185 PARENTERAL SOL 10 GM LIPIDS: HCPCS | Performed by: SURGERY

## 2017-01-01 PROCEDURE — 99223 1ST HOSP IP/OBS HIGH 75: CPT | Mod: ,,, | Performed by: SURGERY

## 2017-01-01 PROCEDURE — 63600175 PHARM REV CODE 636 W HCPCS: Performed by: PHYSICIAN ASSISTANT

## 2017-01-01 PROCEDURE — 0W9930Z DRAINAGE OF RIGHT PLEURAL CAVITY WITH DRAINAGE DEVICE, PERCUTANEOUS APPROACH: ICD-10-PCS | Performed by: THORACIC SURGERY (CARDIOTHORACIC VASCULAR SURGERY)

## 2017-01-01 PROCEDURE — 25500020 PHARM REV CODE 255: Performed by: SURGERY

## 2017-01-01 PROCEDURE — 82962 GLUCOSE BLOOD TEST: CPT

## 2017-01-01 PROCEDURE — 82746 ASSAY OF FOLIC ACID SERUM: CPT

## 2017-01-01 PROCEDURE — 80053 COMPREHEN METABOLIC PANEL: CPT | Mod: 91

## 2017-01-01 PROCEDURE — 84443 ASSAY THYROID STIM HORMONE: CPT

## 2017-01-01 PROCEDURE — 83935 ASSAY OF URINE OSMOLALITY: CPT

## 2017-01-01 PROCEDURE — S5010 5% DEXTROSE AND 0.45% SALINE: HCPCS | Performed by: EMERGENCY MEDICINE

## 2017-01-01 RX ORDER — MORPHINE SULFATE 10 MG/ML
4 INJECTION INTRAMUSCULAR; INTRAVENOUS; SUBCUTANEOUS EVERY 4 HOURS PRN
Status: DISCONTINUED | OUTPATIENT
Start: 2017-01-01 | End: 2017-01-01

## 2017-01-01 RX ORDER — MORPHINE SULFATE 2 MG/ML
2 INJECTION, SOLUTION INTRAMUSCULAR; INTRAVENOUS EVERY 6 HOURS PRN
Status: DISCONTINUED | OUTPATIENT
Start: 2017-01-01 | End: 2017-01-01 | Stop reason: HOSPADM

## 2017-01-01 RX ORDER — MORPHINE SULFATE 2 MG/ML
4 INJECTION, SOLUTION INTRAMUSCULAR; INTRAVENOUS
Status: COMPLETED | OUTPATIENT
Start: 2017-01-01 | End: 2017-01-01

## 2017-01-01 RX ORDER — HEPARIN SODIUM 5000 [USP'U]/ML
5000 INJECTION, SOLUTION INTRAVENOUS; SUBCUTANEOUS EVERY 8 HOURS
Status: DISCONTINUED | OUTPATIENT
Start: 2017-01-01 | End: 2017-01-01 | Stop reason: HOSPADM

## 2017-01-01 RX ORDER — MIDAZOLAM HYDROCHLORIDE 1 MG/ML
INJECTION INTRAMUSCULAR; INTRAVENOUS
Status: DISPENSED
Start: 2017-01-01 | End: 2017-01-01

## 2017-01-01 RX ORDER — SODIUM CHLORIDE 9 MG/ML
1000 INJECTION, SOLUTION INTRAVENOUS
Status: COMPLETED | OUTPATIENT
Start: 2017-01-01 | End: 2017-01-01

## 2017-01-01 RX ORDER — HEPARIN SODIUM 5000 [USP'U]/ML
5000 INJECTION, SOLUTION INTRAVENOUS; SUBCUTANEOUS EVERY 8 HOURS
Status: DISCONTINUED | OUTPATIENT
Start: 2017-01-01 | End: 2018-01-01 | Stop reason: HOSPADM

## 2017-01-01 RX ORDER — FENTANYL CITRATE 50 UG/ML
50 INJECTION, SOLUTION INTRAMUSCULAR; INTRAVENOUS
Status: COMPLETED | OUTPATIENT
Start: 2017-01-01 | End: 2017-01-01

## 2017-01-01 RX ORDER — PROCHLORPERAZINE EDISYLATE 5 MG/ML
5 INJECTION INTRAMUSCULAR; INTRAVENOUS EVERY 6 HOURS PRN
Status: DISCONTINUED | OUTPATIENT
Start: 2017-01-01 | End: 2017-01-01

## 2017-01-01 RX ORDER — SODIUM CHLORIDE, SODIUM LACTATE, POTASSIUM CHLORIDE, CALCIUM CHLORIDE 600; 310; 30; 20 MG/100ML; MG/100ML; MG/100ML; MG/100ML
1000 INJECTION, SOLUTION INTRAVENOUS
Status: COMPLETED | OUTPATIENT
Start: 2017-01-01 | End: 2017-01-01

## 2017-01-01 RX ORDER — ONDANSETRON 2 MG/ML
4 INJECTION INTRAMUSCULAR; INTRAVENOUS
Status: COMPLETED | OUTPATIENT
Start: 2017-01-01 | End: 2017-01-01

## 2017-01-01 RX ORDER — TRAZODONE HYDROCHLORIDE 50 MG/1
50 TABLET ORAL NIGHTLY
Status: DISCONTINUED | OUTPATIENT
Start: 2017-01-01 | End: 2018-01-01 | Stop reason: HOSPADM

## 2017-01-01 RX ORDER — MAGNESIUM SULFATE HEPTAHYDRATE 40 MG/ML
2 INJECTION, SOLUTION INTRAVENOUS ONCE
Status: COMPLETED | OUTPATIENT
Start: 2017-01-01 | End: 2017-01-01

## 2017-01-01 RX ORDER — MORPHINE SULFATE 10 MG/ML
INJECTION INTRAMUSCULAR; INTRAVENOUS; SUBCUTANEOUS
Status: COMPLETED
Start: 2017-01-01 | End: 2017-01-01

## 2017-01-01 RX ORDER — SODIUM CHLORIDE 0.9 % (FLUSH) 0.9 %
10 SYRINGE (ML) INJECTION
Status: DISCONTINUED | OUTPATIENT
Start: 2017-01-01 | End: 2018-01-01 | Stop reason: HOSPADM

## 2017-01-01 RX ORDER — METOCLOPRAMIDE HYDROCHLORIDE 5 MG/ML
10 INJECTION INTRAMUSCULAR; INTRAVENOUS
Status: COMPLETED | OUTPATIENT
Start: 2017-01-01 | End: 2017-01-01

## 2017-01-01 RX ORDER — ERGOCALCIFEROL 1.25 MG/1
50000 CAPSULE ORAL
Status: DISCONTINUED | OUTPATIENT
Start: 2017-01-01 | End: 2017-01-01 | Stop reason: HOSPADM

## 2017-01-01 RX ORDER — SODIUM CHLORIDE 9 MG/ML
INJECTION, SOLUTION INTRAVENOUS CONTINUOUS
Status: ACTIVE | OUTPATIENT
Start: 2017-01-01 | End: 2017-01-01

## 2017-01-01 RX ORDER — IPRATROPIUM BROMIDE AND ALBUTEROL SULFATE 2.5; .5 MG/3ML; MG/3ML
3 SOLUTION RESPIRATORY (INHALATION) EVERY 4 HOURS PRN
Status: DISCONTINUED | OUTPATIENT
Start: 2017-01-01 | End: 2018-01-01 | Stop reason: HOSPADM

## 2017-01-01 RX ORDER — SODIUM CHLORIDE 9 MG/ML
500 INJECTION, SOLUTION INTRAVENOUS
Status: COMPLETED | OUTPATIENT
Start: 2017-01-01 | End: 2017-01-01

## 2017-01-01 RX ORDER — PANTOPRAZOLE SODIUM 40 MG/1
40 TABLET, DELAYED RELEASE ORAL DAILY
Status: DISCONTINUED | OUTPATIENT
Start: 2017-01-01 | End: 2017-01-01 | Stop reason: HOSPADM

## 2017-01-01 RX ORDER — METOCLOPRAMIDE 10 MG/1
10 TABLET ORAL
Status: DISCONTINUED | OUTPATIENT
Start: 2017-01-01 | End: 2017-01-01 | Stop reason: HOSPADM

## 2017-01-01 RX ORDER — SODIUM CHLORIDE 450 MG/100ML
INJECTION, SOLUTION INTRAVENOUS CONTINUOUS
Status: DISCONTINUED | OUTPATIENT
Start: 2017-01-01 | End: 2017-01-01

## 2017-01-01 RX ORDER — ONDANSETRON 2 MG/ML
4 INJECTION INTRAMUSCULAR; INTRAVENOUS EVERY 8 HOURS PRN
Status: DISCONTINUED | OUTPATIENT
Start: 2017-01-01 | End: 2017-01-01 | Stop reason: HOSPADM

## 2017-01-01 RX ORDER — MIDAZOLAM HYDROCHLORIDE 1 MG/ML
INJECTION INTRAMUSCULAR; INTRAVENOUS CODE/TRAUMA/SEDATION MEDICATION
Status: COMPLETED | OUTPATIENT
Start: 2017-01-01 | End: 2017-01-01

## 2017-01-01 RX ORDER — FENTANYL CITRATE 50 UG/ML
INJECTION, SOLUTION INTRAMUSCULAR; INTRAVENOUS CODE/TRAUMA/SEDATION MEDICATION
Status: COMPLETED | OUTPATIENT
Start: 2017-01-01 | End: 2017-01-01

## 2017-01-01 RX ORDER — POTASSIUM CHLORIDE 20 MEQ/15ML
20 SOLUTION ORAL ONCE
Status: COMPLETED | OUTPATIENT
Start: 2017-01-01 | End: 2017-01-01

## 2017-01-01 RX ORDER — SODIUM CHLORIDE, SODIUM LACTATE, POTASSIUM CHLORIDE, CALCIUM CHLORIDE 600; 310; 30; 20 MG/100ML; MG/100ML; MG/100ML; MG/100ML
INJECTION, SOLUTION INTRAVENOUS CONTINUOUS
Status: DISCONTINUED | OUTPATIENT
Start: 2017-01-01 | End: 2017-01-01 | Stop reason: HOSPADM

## 2017-01-01 RX ORDER — DEXTROSE MONOHYDRATE AND SODIUM CHLORIDE 5; .9 G/100ML; G/100ML
INJECTION, SOLUTION INTRAVENOUS CONTINUOUS
Status: DISCONTINUED | OUTPATIENT
Start: 2017-01-01 | End: 2017-01-01 | Stop reason: HOSPADM

## 2017-01-01 RX ORDER — MIDAZOLAM HYDROCHLORIDE 5 MG/ML
1 INJECTION INTRAMUSCULAR; INTRAVENOUS
Status: DISCONTINUED | OUTPATIENT
Start: 2017-01-01 | End: 2017-01-01

## 2017-01-01 RX ORDER — MORPHINE SULFATE 2 MG/ML
2 INJECTION, SOLUTION INTRAMUSCULAR; INTRAVENOUS EVERY 4 HOURS PRN
Status: DISCONTINUED | OUTPATIENT
Start: 2017-01-01 | End: 2017-01-01 | Stop reason: HOSPADM

## 2017-01-01 RX ORDER — LOPERAMIDE HYDROCHLORIDE 2 MG/1
2 CAPSULE ORAL 4 TIMES DAILY PRN
Status: ON HOLD | COMMUNITY
End: 2017-01-01 | Stop reason: HOSPADM

## 2017-01-01 RX ORDER — METOCLOPRAMIDE 10 MG/1
10 TABLET ORAL
Qty: 90 TABLET | Refills: 1 | Status: ON HOLD | OUTPATIENT
Start: 2017-01-01 | End: 2017-01-01 | Stop reason: HOSPADM

## 2017-01-01 RX ORDER — ERGOCALCIFEROL 1.25 MG/1
50000 CAPSULE ORAL
Status: DISCONTINUED | OUTPATIENT
Start: 2017-01-01 | End: 2017-01-01

## 2017-01-01 RX ORDER — ONDANSETRON 2 MG/ML
8 INJECTION INTRAMUSCULAR; INTRAVENOUS
Status: COMPLETED | OUTPATIENT
Start: 2017-01-01 | End: 2017-01-01

## 2017-01-01 RX ORDER — PANTOPRAZOLE SODIUM 20 MG/1
20 TABLET, DELAYED RELEASE ORAL DAILY
COMMUNITY

## 2017-01-01 RX ORDER — ONDANSETRON 2 MG/ML
4 INJECTION INTRAMUSCULAR; INTRAVENOUS EVERY 6 HOURS PRN
Status: DISCONTINUED | OUTPATIENT
Start: 2017-01-01 | End: 2018-01-01 | Stop reason: HOSPADM

## 2017-01-01 RX ORDER — SODIUM CHLORIDE 9 MG/ML
INJECTION, SOLUTION INTRAVENOUS CONTINUOUS
Status: DISCONTINUED | OUTPATIENT
Start: 2017-01-01 | End: 2017-01-01

## 2017-01-01 RX ORDER — MORPHINE SULFATE 2 MG/ML
4 INJECTION, SOLUTION INTRAMUSCULAR; INTRAVENOUS
Status: DISCONTINUED | OUTPATIENT
Start: 2017-01-01 | End: 2017-01-01

## 2017-01-01 RX ORDER — FAMOTIDINE 10 MG/ML
20 INJECTION INTRAVENOUS DAILY
Status: DISCONTINUED | OUTPATIENT
Start: 2017-01-01 | End: 2018-01-01 | Stop reason: HOSPADM

## 2017-01-01 RX ORDER — DICYCLOMINE HYDROCHLORIDE 20 MG/1
20 TABLET ORAL 2 TIMES DAILY
Qty: 20 TABLET | Refills: 0 | Status: SHIPPED | OUTPATIENT
Start: 2017-01-01 | End: 2017-01-01

## 2017-01-01 RX ORDER — MORPHINE SULFATE 2 MG/ML
4 INJECTION, SOLUTION INTRAMUSCULAR; INTRAVENOUS EVERY 4 HOURS PRN
Status: DISCONTINUED | OUTPATIENT
Start: 2017-01-01 | End: 2017-01-01 | Stop reason: HOSPADM

## 2017-01-01 RX ORDER — AMOXICILLIN 250 MG
1 CAPSULE ORAL 2 TIMES DAILY
Status: DISCONTINUED | OUTPATIENT
Start: 2017-01-01 | End: 2017-01-01

## 2017-01-01 RX ORDER — DEXTROSE MONOHYDRATE, SODIUM CHLORIDE, AND POTASSIUM CHLORIDE 50; 1.49; 9 G/1000ML; G/1000ML; G/1000ML
INJECTION, SOLUTION INTRAVENOUS CONTINUOUS
Status: DISCONTINUED | OUTPATIENT
Start: 2017-01-01 | End: 2017-01-01

## 2017-01-01 RX ORDER — LIDOCAINE HYDROCHLORIDE 10 MG/ML
10 INJECTION INFILTRATION; PERINEURAL ONCE
Status: COMPLETED | OUTPATIENT
Start: 2017-01-01 | End: 2017-01-01

## 2017-01-01 RX ORDER — ONDANSETRON 2 MG/ML
4 INJECTION INTRAMUSCULAR; INTRAVENOUS EVERY 6 HOURS PRN
Status: DISCONTINUED | OUTPATIENT
Start: 2017-01-01 | End: 2017-01-01 | Stop reason: HOSPADM

## 2017-01-01 RX ORDER — PROCHLORPERAZINE EDISYLATE 5 MG/ML
10 INJECTION INTRAMUSCULAR; INTRAVENOUS
Status: COMPLETED | OUTPATIENT
Start: 2017-01-01 | End: 2017-01-01

## 2017-01-01 RX ORDER — SODIUM CHLORIDE 0.9 % (FLUSH) 0.9 %
3 SYRINGE (ML) INJECTION
Status: DISCONTINUED | OUTPATIENT
Start: 2017-01-01 | End: 2017-01-01

## 2017-01-01 RX ORDER — SIMETHICONE 80 MG
80 TABLET,CHEWABLE ORAL EVERY 6 HOURS PRN
Status: DISCONTINUED | OUTPATIENT
Start: 2017-01-01 | End: 2017-01-01

## 2017-01-01 RX ORDER — SODIUM CHLORIDE 0.9 % (FLUSH) 0.9 %
10 SYRINGE (ML) INJECTION EVERY 6 HOURS
Status: DISCONTINUED | OUTPATIENT
Start: 2017-01-01 | End: 2018-01-01 | Stop reason: HOSPADM

## 2017-01-01 RX ORDER — FAMOTIDINE 10 MG/ML
20 INJECTION INTRAVENOUS 2 TIMES DAILY
Status: DISCONTINUED | OUTPATIENT
Start: 2017-01-01 | End: 2017-01-01 | Stop reason: DRUGHIGH

## 2017-01-01 RX ORDER — DEXTROSE MONOHYDRATE AND SODIUM CHLORIDE 5; .45 G/100ML; G/100ML
1000 INJECTION, SOLUTION INTRAVENOUS
Status: COMPLETED | OUTPATIENT
Start: 2017-01-01 | End: 2017-01-01

## 2017-01-01 RX ORDER — LIDOCAINE HYDROCHLORIDE 10 MG/ML
INJECTION INFILTRATION; PERINEURAL
Status: COMPLETED
Start: 2017-01-01 | End: 2017-01-01

## 2017-01-01 RX ORDER — HYDROCODONE BITARTRATE AND ACETAMINOPHEN 5; 325 MG/1; MG/1
1 TABLET ORAL EVERY 4 HOURS PRN
Status: DISCONTINUED | OUTPATIENT
Start: 2017-01-01 | End: 2017-01-01 | Stop reason: HOSPADM

## 2017-01-01 RX ORDER — MIDAZOLAM HYDROCHLORIDE 5 MG/ML
2 INJECTION INTRAMUSCULAR; INTRAVENOUS
Status: DISCONTINUED | OUTPATIENT
Start: 2017-01-01 | End: 2017-01-01

## 2017-01-01 RX ORDER — PANTOPRAZOLE SODIUM 40 MG/10ML
40 INJECTION, POWDER, LYOPHILIZED, FOR SOLUTION INTRAVENOUS DAILY
Status: DISCONTINUED | OUTPATIENT
Start: 2017-01-01 | End: 2017-01-01 | Stop reason: HOSPADM

## 2017-01-01 RX ORDER — MIDAZOLAM HYDROCHLORIDE 1 MG/ML
1 INJECTION INTRAMUSCULAR; INTRAVENOUS
Status: COMPLETED | OUTPATIENT
Start: 2017-01-01 | End: 2017-01-01

## 2017-01-01 RX ORDER — ACETAMINOPHEN 325 MG/1
650 TABLET ORAL EVERY 6 HOURS PRN
Status: DISCONTINUED | OUTPATIENT
Start: 2017-01-01 | End: 2017-01-01 | Stop reason: HOSPADM

## 2017-01-01 RX ORDER — METOCLOPRAMIDE 10 MG/1
10 TABLET ORAL
Qty: 90 TABLET | Refills: 1 | Status: SHIPPED | OUTPATIENT
Start: 2017-01-01 | End: 2017-01-01

## 2017-01-01 RX ORDER — ONDANSETRON 2 MG/ML
4 INJECTION INTRAMUSCULAR; INTRAVENOUS
Status: DISCONTINUED | OUTPATIENT
Start: 2017-01-01 | End: 2017-01-01

## 2017-01-01 RX ORDER — SODIUM BICARBONATE 650 MG/1
1 TABLET ORAL 4 TIMES DAILY
Status: DISCONTINUED | OUTPATIENT
Start: 2017-01-01 | End: 2017-01-01 | Stop reason: HOSPADM

## 2017-01-01 RX ORDER — MORPHINE SULFATE 2 MG/ML
2 INJECTION, SOLUTION INTRAMUSCULAR; INTRAVENOUS
Status: COMPLETED | OUTPATIENT
Start: 2017-01-01 | End: 2017-01-01

## 2017-01-01 RX ORDER — MORPHINE SULFATE 10 MG/ML
2 INJECTION INTRAMUSCULAR; INTRAVENOUS; SUBCUTANEOUS EVERY 4 HOURS PRN
Status: DISCONTINUED | OUTPATIENT
Start: 2017-01-01 | End: 2017-01-01

## 2017-01-01 RX ORDER — TRAZODONE HYDROCHLORIDE 50 MG/1
50 TABLET ORAL ONCE
Status: COMPLETED | OUTPATIENT
Start: 2017-01-01 | End: 2017-01-01

## 2017-01-01 RX ORDER — SODIUM CHLORIDE 0.9 % (FLUSH) 0.9 %
3 SYRINGE (ML) INJECTION EVERY 8 HOURS
Status: DISCONTINUED | OUTPATIENT
Start: 2017-01-01 | End: 2017-01-01 | Stop reason: HOSPADM

## 2017-01-01 RX ORDER — ONDANSETRON 8 MG/1
8 TABLET, ORALLY DISINTEGRATING ORAL EVERY 8 HOURS PRN
Status: DISCONTINUED | OUTPATIENT
Start: 2017-01-01 | End: 2017-01-01 | Stop reason: HOSPADM

## 2017-01-01 RX ORDER — MIDAZOLAM HYDROCHLORIDE 1 MG/ML
2 INJECTION INTRAMUSCULAR; INTRAVENOUS
Status: DISCONTINUED | OUTPATIENT
Start: 2017-01-01 | End: 2017-01-01

## 2017-01-01 RX ORDER — ONDANSETRON 4 MG/1
8 TABLET, ORALLY DISINTEGRATING ORAL EVERY 8 HOURS PRN
Qty: 20 TABLET | Refills: 0 | Status: SHIPPED | OUTPATIENT
Start: 2017-01-01

## 2017-01-01 RX ORDER — DEXTROSE MONOHYDRATE AND SODIUM CHLORIDE 5; .9 G/100ML; G/100ML
1000 INJECTION, SOLUTION INTRAVENOUS
Status: COMPLETED | OUTPATIENT
Start: 2017-01-01 | End: 2017-01-01

## 2017-01-01 RX ORDER — SODIUM CHLORIDE 9 MG/ML
INJECTION, SOLUTION INTRAVENOUS CONTINUOUS
Status: DISCONTINUED | OUTPATIENT
Start: 2017-01-01 | End: 2017-01-01 | Stop reason: HOSPADM

## 2017-01-01 RX ADMIN — SODIUM CHLORIDE, PRESERVATIVE FREE 10 ML: 5 INJECTION INTRAVENOUS at 11:12

## 2017-01-01 RX ADMIN — SODIUM CHLORIDE, PRESERVATIVE FREE 10 ML: 5 INJECTION INTRAVENOUS at 09:12

## 2017-01-01 RX ADMIN — SODIUM BICARBONATE: 84 INJECTION, SOLUTION INTRAVENOUS at 11:12

## 2017-01-01 RX ADMIN — HEPARIN SODIUM 5000 UNITS: 5000 INJECTION, SOLUTION INTRAVENOUS; SUBCUTANEOUS at 09:12

## 2017-01-01 RX ADMIN — DEXTROSE MONOHYDRATE AND SODIUM CHLORIDE: 5; .9 INJECTION, SOLUTION INTRAVENOUS at 10:12

## 2017-01-01 RX ADMIN — METOCLOPRAMIDE 10 MG: 10 TABLET ORAL at 11:12

## 2017-01-01 RX ADMIN — MORPHINE SULFATE 2 MG: 10 INJECTION INTRAVENOUS at 03:12

## 2017-01-01 RX ADMIN — HEPARIN SODIUM 5000 UNITS: 5000 INJECTION, SOLUTION INTRAVENOUS; SUBCUTANEOUS at 02:12

## 2017-01-01 RX ADMIN — SODIUM CHLORIDE 500 ML: 0.9 INJECTION, SOLUTION INTRAVENOUS at 04:07

## 2017-01-01 RX ADMIN — METOCLOPRAMIDE 10 MG: 5 INJECTION, SOLUTION INTRAMUSCULAR; INTRAVENOUS at 03:12

## 2017-01-01 RX ADMIN — ONDANSETRON 8 MG: 2 INJECTION INTRAMUSCULAR; INTRAVENOUS at 07:12

## 2017-01-01 RX ADMIN — SODIUM CHLORIDE, PRESERVATIVE FREE 10 ML: 5 INJECTION INTRAVENOUS at 05:12

## 2017-01-01 RX ADMIN — HEPARIN SODIUM 5000 UNITS: 5000 INJECTION, SOLUTION INTRAVENOUS; SUBCUTANEOUS at 05:12

## 2017-01-01 RX ADMIN — MIDAZOLAM HYDROCHLORIDE 1 MG: 1 INJECTION, SOLUTION INTRAMUSCULAR; INTRAVENOUS at 11:12

## 2017-01-01 RX ADMIN — SODIUM BICARBONATE: 84 INJECTION, SOLUTION INTRAVENOUS at 10:12

## 2017-01-01 RX ADMIN — SODIUM CHLORIDE: 0.9 INJECTION, SOLUTION INTRAVENOUS at 12:12

## 2017-01-01 RX ADMIN — SOYBEAN OIL 250 ML: 20 INJECTION, SOLUTION INTRAVENOUS at 09:12

## 2017-01-01 RX ADMIN — PANTOPRAZOLE SODIUM 40 MG: 40 TABLET, DELAYED RELEASE ORAL at 09:12

## 2017-01-01 RX ADMIN — SODIUM CHLORIDE 1000 ML: 0.9 INJECTION, SOLUTION INTRAVENOUS at 03:12

## 2017-01-01 RX ADMIN — ONDANSETRON 4 MG: 2 INJECTION INTRAMUSCULAR; INTRAVENOUS at 04:12

## 2017-01-01 RX ADMIN — MIDAZOLAM HYDROCHLORIDE 1 MG: 1 INJECTION, SOLUTION INTRAMUSCULAR; INTRAVENOUS at 03:12

## 2017-01-01 RX ADMIN — SODIUM CHLORIDE, PRESERVATIVE FREE 10 ML: 5 INJECTION INTRAVENOUS at 06:12

## 2017-01-01 RX ADMIN — FAMOTIDINE 20 MG: 10 INJECTION, SOLUTION INTRAVENOUS at 08:12

## 2017-01-01 RX ADMIN — DEXTROSE MONOHYDRATE AND SODIUM CHLORIDE: 5; .9 INJECTION, SOLUTION INTRAVENOUS at 04:12

## 2017-01-01 RX ADMIN — PANTOPRAZOLE SODIUM 40 MG: 40 TABLET, DELAYED RELEASE ORAL at 08:12

## 2017-01-01 RX ADMIN — ASCORBIC ACID, VITAMIN A PALMITATE, CHOLECALCIFEROL, THIAMINE HYDROCHLORIDE, RIBOFLAVIN-5 PHOSPHATE SODIUM, PYRIDOXINE HYDROCHLORIDE, NIACINAMIDE, DEXPANTHENOL, ALPHA-TOCOPHEROL ACETATE, VITAMIN K1, FOLIC ACID, BIOTIN, CYANOCOBALAMIN: 200; 3300; 200; 6; 3.6; 6; 40; 15; 10; 150; 600; 60; 5 INJECTION, SOLUTION INTRAVENOUS at 06:12

## 2017-01-01 RX ADMIN — SODIUM CHLORIDE, SODIUM LACTATE, POTASSIUM CHLORIDE, AND CALCIUM CHLORIDE: 600; 310; 30; 20 INJECTION, SOLUTION INTRAVENOUS at 05:12

## 2017-01-01 RX ADMIN — SODIUM CHLORIDE: 0.9 INJECTION, SOLUTION INTRAVENOUS at 02:05

## 2017-01-01 RX ADMIN — METOCLOPRAMIDE 10 MG: 10 TABLET ORAL at 05:12

## 2017-01-01 RX ADMIN — SODIUM CHLORIDE, SODIUM LACTATE, POTASSIUM CHLORIDE, AND CALCIUM CHLORIDE 1000 ML: .6; .31; .03; .02 INJECTION, SOLUTION INTRAVENOUS at 04:12

## 2017-01-01 RX ADMIN — FENTANYL CITRATE 50 MCG: 50 INJECTION, SOLUTION INTRAMUSCULAR; INTRAVENOUS at 05:12

## 2017-01-01 RX ADMIN — TRAZODONE HYDROCHLORIDE 50 MG: 50 TABLET ORAL at 12:12

## 2017-01-01 RX ADMIN — HEPARIN SODIUM 5000 UNITS: 5000 INJECTION, SOLUTION INTRAVENOUS; SUBCUTANEOUS at 10:12

## 2017-01-01 RX ADMIN — PANTOPRAZOLE SODIUM 40 MG: 40 INJECTION, POWDER, FOR SOLUTION INTRAVENOUS at 09:12

## 2017-01-01 RX ADMIN — HEPARIN SODIUM 5000 UNITS: 5000 INJECTION, SOLUTION INTRAVENOUS; SUBCUTANEOUS at 11:12

## 2017-01-01 RX ADMIN — ONDANSETRON 8 MG: 2 INJECTION INTRAMUSCULAR; INTRAVENOUS at 09:05

## 2017-01-01 RX ADMIN — ONDANSETRON 4 MG: 2 INJECTION INTRAMUSCULAR; INTRAVENOUS at 05:12

## 2017-01-01 RX ADMIN — FENTANYL CITRATE 50 MCG: 50 INJECTION, SOLUTION INTRAMUSCULAR; INTRAVENOUS at 07:12

## 2017-01-01 RX ADMIN — SODIUM CHLORIDE 500 ML: 0.9 INJECTION, SOLUTION INTRAVENOUS at 09:05

## 2017-01-01 RX ADMIN — MORPHINE SULFATE 4 MG: 10 INJECTION INTRAVENOUS at 09:12

## 2017-01-01 RX ADMIN — TRAZODONE HYDROCHLORIDE 50 MG: 50 TABLET ORAL at 02:12

## 2017-01-01 RX ADMIN — SODIUM CHLORIDE: 0.9 INJECTION, SOLUTION INTRAVENOUS at 03:12

## 2017-01-01 RX ADMIN — SODIUM CHLORIDE 1000 ML: 0.9 INJECTION, SOLUTION INTRAVENOUS at 05:12

## 2017-01-01 RX ADMIN — PROCHLORPERAZINE EDISYLATE 10 MG: 5 INJECTION INTRAMUSCULAR; INTRAVENOUS at 05:12

## 2017-01-01 RX ADMIN — SODIUM BICARBONATE 650 MG TABLET 650 MG: at 05:12

## 2017-01-01 RX ADMIN — MORPHINE SULFATE 2 MG: 10 INJECTION INTRAVENOUS at 09:12

## 2017-01-01 RX ADMIN — LEUCINE, PHENYLALANINE, LYSINE, METHIONINE, ISOLEUCINE, VALINE, HISTIDINE, THREONINE, TRYPTOPHAN, ALANINE, GLYCINE, ARGININE, PROLINE, SERINE, TYROSINE, SODIUM ACETATE, DIBASIC POTASSIUM PHOSPHATE, MAGNESIUM CHLORIDE, SODIUM CHLORIDE, CALCIUM CHLORIDE, DEXTROSE
365; 280; 290; 200; 300; 290; 240; 210; 90; 1035; 515; 575; 340; 250; 20; 340; 261; 51; 59; 33; 15 INJECTION INTRAVENOUS at 03:12

## 2017-01-01 RX ADMIN — PANTOPRAZOLE SODIUM 40 MG: 40 INJECTION, POWDER, FOR SOLUTION INTRAVENOUS at 11:12

## 2017-01-01 RX ADMIN — MORPHINE SULFATE 4 MG: 2 INJECTION, SOLUTION INTRAMUSCULAR; INTRAVENOUS at 03:12

## 2017-01-01 RX ADMIN — METOCLOPRAMIDE 10 MG: 10 TABLET ORAL at 03:12

## 2017-01-01 RX ADMIN — SODIUM CHLORIDE: 0.9 INJECTION, SOLUTION INTRAVENOUS at 06:12

## 2017-01-01 RX ADMIN — HEPARIN SODIUM 5000 UNITS: 5000 INJECTION, SOLUTION INTRAVENOUS; SUBCUTANEOUS at 06:12

## 2017-01-01 RX ADMIN — LEUCINE, PHENYLALANINE, LYSINE, METHIONINE, ISOLEUCINE, VALINE, HISTIDINE, THREONINE, TRYPTOPHAN, ALANINE, GLYCINE, ARGININE, PROLINE, SERINE, TYROSINE, SODIUM ACETATE, DIBASIC POTASSIUM PHOSPHATE, MAGNESIUM CHLORIDE, SODIUM CHLORIDE, CALCIUM CHLORIDE, DEXTROSE
365; 280; 290; 200; 300; 290; 240; 210; 90; 1035; 515; 575; 340; 250; 20; 340; 261; 51; 59; 33; 15 INJECTION INTRAVENOUS at 06:12

## 2017-01-01 RX ADMIN — ONDANSETRON 4 MG: 2 INJECTION INTRAMUSCULAR; INTRAVENOUS at 03:12

## 2017-01-01 RX ADMIN — FAMOTIDINE 20 MG: 10 INJECTION, SOLUTION INTRAVENOUS at 10:12

## 2017-01-01 RX ADMIN — IOHEXOL 75 ML: 350 INJECTION, SOLUTION INTRAVENOUS at 04:12

## 2017-01-01 RX ADMIN — HEPARIN SODIUM 5000 UNITS: 5000 INJECTION, SOLUTION INTRAVENOUS; SUBCUTANEOUS at 04:12

## 2017-01-01 RX ADMIN — DEXTROSE MONOHYDRATE, SODIUM CHLORIDE, AND POTASSIUM CHLORIDE: 50; 9; 1.49 INJECTION, SOLUTION INTRAVENOUS at 09:12

## 2017-01-01 RX ADMIN — LIDOCAINE HYDROCHLORIDE 200 MG: 10 INJECTION, SOLUTION INFILTRATION; PERINEURAL at 04:12

## 2017-01-01 RX ADMIN — TRAZODONE HYDROCHLORIDE 50 MG: 50 TABLET ORAL at 10:12

## 2017-01-01 RX ADMIN — DEXTROSE MONOHYDRATE AND SODIUM CHLORIDE: 5; .9 INJECTION, SOLUTION INTRAVENOUS at 03:12

## 2017-01-01 RX ADMIN — SODIUM CHLORIDE, PRESERVATIVE FREE 10 ML: 5 INJECTION INTRAVENOUS at 12:12

## 2017-01-01 RX ADMIN — MORPHINE SULFATE 2 MG: 10 INJECTION INTRAVENOUS at 11:12

## 2017-01-01 RX ADMIN — LEUCINE, PHENYLALANINE, LYSINE, METHIONINE, ISOLEUCINE, VALINE, HISTIDINE, THREONINE, TRYPTOPHAN, ALANINE, GLYCINE, ARGININE, PROLINE, SERINE, TYROSINE, SODIUM ACETATE, DIBASIC POTASSIUM PHOSPHATE, MAGNESIUM CHLORIDE, SODIUM CHLORIDE, CALCIUM CHLORIDE, DEXTROSE
365; 280; 290; 200; 300; 290; 240; 210; 90; 1035; 515; 575; 340; 250; 20; 340; 261; 51; 59; 33; 15 INJECTION INTRAVENOUS at 08:12

## 2017-01-01 RX ADMIN — LIDOCAINE HYDROCHLORIDE 10 ML: 10 INJECTION, SOLUTION INFILTRATION; PERINEURAL at 01:05

## 2017-01-01 RX ADMIN — ONDANSETRON 4 MG: 2 INJECTION INTRAMUSCULAR; INTRAVENOUS at 08:12

## 2017-01-01 RX ADMIN — MULTIVITAMIN 15 ML: LIQUID ORAL at 05:12

## 2017-01-01 RX ADMIN — SODIUM BICARBONATE 650 MG TABLET 650 MG: at 11:12

## 2017-01-01 RX ADMIN — MORPHINE SULFATE 2 MG: 2 INJECTION, SOLUTION INTRAMUSCULAR; INTRAVENOUS at 01:05

## 2017-01-01 RX ADMIN — METOCLOPRAMIDE 10 MG: 10 TABLET ORAL at 04:12

## 2017-01-01 RX ADMIN — HEPARIN SODIUM 5000 UNITS: 5000 INJECTION, SOLUTION INTRAVENOUS; SUBCUTANEOUS at 01:12

## 2017-01-01 RX ADMIN — DEXTROSE AND SODIUM CHLORIDE 1000 ML: 5; .45 INJECTION, SOLUTION INTRAVENOUS at 08:12

## 2017-01-01 RX ADMIN — SODIUM CHLORIDE 1000 ML: 0.9 INJECTION, SOLUTION INTRAVENOUS at 09:12

## 2017-01-01 RX ADMIN — IOHEXOL 75 ML: 350 INJECTION, SOLUTION INTRAVENOUS at 10:05

## 2017-01-01 RX ADMIN — SODIUM CHLORIDE, PRESERVATIVE FREE 3 ML: 5 INJECTION INTRAVENOUS at 01:05

## 2017-01-01 RX ADMIN — FAMOTIDINE 20 MG: 10 INJECTION, SOLUTION INTRAVENOUS at 11:12

## 2017-01-01 RX ADMIN — HEPARIN SODIUM 5000 UNITS: 5000 INJECTION, SOLUTION INTRAVENOUS; SUBCUTANEOUS at 03:12

## 2017-01-01 RX ADMIN — SODIUM CHLORIDE: 0.9 INJECTION, SOLUTION INTRAVENOUS at 05:12

## 2017-01-01 RX ADMIN — MORPHINE SULFATE 4 MG: 2 INJECTION, SOLUTION INTRAMUSCULAR; INTRAVENOUS at 11:12

## 2017-01-01 RX ADMIN — DIATRIZOATE MEGLUMINE AND DIATRIZOATE SODIUM 240 ML: 660; 100 LIQUID ORAL; RECTAL at 05:12

## 2017-01-01 RX ADMIN — SODIUM CHLORIDE: 0.9 INJECTION, SOLUTION INTRAVENOUS at 11:12

## 2017-01-01 RX ADMIN — ERGOCALCIFEROL 50000 UNITS: 1.25 CAPSULE ORAL at 11:12

## 2017-01-01 RX ADMIN — PANTOPRAZOLE SODIUM 40 MG: 40 INJECTION, POWDER, FOR SOLUTION INTRAVENOUS at 08:12

## 2017-01-01 RX ADMIN — SODIUM CHLORIDE 1000 ML: 0.9 INJECTION, SOLUTION INTRAVENOUS at 01:05

## 2017-01-01 RX ADMIN — SODIUM CHLORIDE, SODIUM LACTATE, POTASSIUM CHLORIDE, AND CALCIUM CHLORIDE: 600; 310; 30; 20 INJECTION, SOLUTION INTRAVENOUS at 09:12

## 2017-01-01 RX ADMIN — POTASSIUM CHLORIDE 20 MEQ: 20 SOLUTION ORAL at 11:12

## 2017-01-01 RX ADMIN — DEXTROSE AND SODIUM CHLORIDE 1000 ML: 5; .9 INJECTION, SOLUTION INTRAVENOUS at 07:12

## 2017-01-01 RX ADMIN — FENTANYL CITRATE 50 MCG: 50 INJECTION, SOLUTION INTRAMUSCULAR; INTRAVENOUS at 11:12

## 2017-01-01 RX ADMIN — SODIUM CHLORIDE 500 ML: 0.9 INJECTION, SOLUTION INTRAVENOUS at 03:07

## 2017-01-01 RX ADMIN — MAGNESIUM SULFATE IN WATER 2 G: 40 INJECTION, SOLUTION INTRAVENOUS at 09:05

## 2017-01-01 RX ADMIN — DEXTROSE MONOHYDRATE AND SODIUM CHLORIDE: 5; .9 INJECTION, SOLUTION INTRAVENOUS at 06:12

## 2017-01-01 RX ADMIN — MORPHINE SULFATE 4 MG: 10 INJECTION INTRAVENOUS at 01:12

## 2017-01-01 RX ADMIN — SODIUM CHLORIDE, SODIUM LACTATE, POTASSIUM CHLORIDE, AND CALCIUM CHLORIDE: 600; 310; 30; 20 INJECTION, SOLUTION INTRAVENOUS at 08:12

## 2017-01-01 RX ADMIN — IOHEXOL 75 ML: 350 INJECTION, SOLUTION INTRAVENOUS at 04:07

## 2017-05-02 NOTE — ED NOTES
Pt presents to ed with c/o abdominal pain and nausea. Pt has colostomy, reports that he has not had a bowel movement since yesterday afternoon, which is abnormal for him. At triage reports generalized abdominal pain, gas, and dry heaving. On assessment, pt has passed approximately 120ccs of liquid stool and gas. Reports significant relief. Pt reports he took two Dulcolax around 2200 last night. Pt is aaox4, neuro intact, daughter at bedside. Will continue to monitor.

## 2017-05-02 NOTE — ED AVS SNAPSHOT
[facility]  [address]  [place] [address]               Moises Hernandez   2017  4:05 AM   ED    Description:  Male : 1935   Department:  [facility]           Your Care was Coordinated By:     Provider Role From To    Guy J. Lefort, MD Attending Provider 17 0453 --      Reason for Visit     Abdominal Pain           Diagnoses this Visit        Comments    Abdominal cramping    -  Primary     Altered bowel elimination due to intestinal ostomy           ED Disposition     ED Disposition Condition Comment    Discharge             To Do List           Follow-up Information     Go to Rodriguez Castro MD.    Specialty:  Family Medicine    Why:  As needed    Contact information:    [address]  [place] [address]  [place] [address] [phone]          Follow up with [facility].    Specialty:  Emergency Medicine    Why:  If symptoms worsen or any other concerns    Contact information:    [address]  [place] [address] [phone]       These Medications        Disp Refills Start End    dicyclomine (BENTYL) 20 mg tablet 20 tablet 0 2017    Take 1 tablet (20 mg total) by mouth 2 (two) times daily. - Oral    Pharmacy: [facility]/pharmacy #[id number] - [place] - [address] [facility] Ph #: [phone]         [facility]     [facility] Care [facility] - 24/ Assistance  Unless otherwise directed by your provider, please contact [facility], our nurse care line that is available for 24/ assistance.     Registered nurses in the [facility] provide: appointment scheduling, clinical advisement, health education, and other advisory services.  Call: [phone] (toll free)               Medications           Message regarding Medications     Verify the changes and/or additions to your medication regime listed below are the same as discussed with your clinician today.   "If any of these changes or additions are incorrect, please notify your healthcare provider.        START taking these NEW medications        Refills    dicyclomine (BENTYL) 20 mg tablet 0    Sig: Take 1 tablet (20 mg total) by mouth 2 (two) times daily.    Class: Print    Route: Oral      STOP taking these medications     diphenoxylate-atropine 2.5-0.025 mg (LOMOTIL) 2.5-0.025 mg per tablet Take 1 tablet by mouth 4 (four) times daily.    loperamide (IMODIUM) 1 mg/5 mL solution Take 2 mg by mouth every 6 (six) hours as needed for Diarrhea.           Verify that the below list of medications is an accurate representation of the medications you are currently taking.  If none reported, the list may be blank. If incorrect, please contact your healthcare provider. Carry this list with you in case of emergency.           Current Medications     escitalopram oxalate (LEXAPRO) 10 MG tablet Take 1 tablet (10 mg total) by mouth once daily.    pantoprazole (PROTONIX) 20 MG tablet Take 20 mg by mouth once daily.    PROAIR HFA 90 mcg/actuation inhaler     simethicone (MYLICON) 80 MG chewable tablet Take 80 mg by mouth every 6 (six) hours as needed for Flatulence.    trazodone (DESYREL) 50 MG tablet Take 1 tablet (50 mg total) by mouth every evening.    dicyclomine (BENTYL) 20 mg tablet Take 1 tablet (20 mg total) by mouth 2 (two) times daily.           Clinical Reference Information           Your Vitals Were     BP Pulse Temp Resp Height Weight    136/95 (BP Location: Left arm) 95 98.5 °F (36.9 °C) (Oral) 18 5' 6" (1.676 m) 49 kg (108 lb)    SpO2 BMI             99% 17.43 kg/m2         Allergies as of 5/2/2017     No Known Allergies      Immunizations Administered on Date of Encounter - 5/2/2017     None      ED Micro, Lab, POCT     None      ED Imaging Orders     None      Discharge References/Attachments     ABDOMINAL PAIN, UNKNOWN CAUSE, (MALE) (ENGLISH)       [facility] complies with applicable Federal " civil rights laws and does not discriminate on the basis of race, color, national origin, age, disability, or sex.        Language Assistance Services     ATTENTION: Language assistance services are available, free of charge. Please call [phone].      ATENCIÓN: Si habla lorenzo, tiene a marshall disposición servicios gratuitos de asistencia lingüística. Llame al [phone].     CHÚ Ý: N?u b?n nói Ti?ng Vi?t, có các d?ch v? h? tr? ngôn ng? mi?n phí dành cho b?n. G?i s? [phone].

## 2017-05-04 PROBLEM — R10.9 ABDOMINAL PAIN: Status: ACTIVE | Noted: 2017-01-01

## 2017-05-04 PROBLEM — J93.9 PNEUMOTHORAX: Status: ACTIVE | Noted: 2017-01-01

## 2017-05-04 NOTE — ED PROVIDER NOTES
Encounter Date: 5/4/2017       History     Chief Complaint   Patient presents with    Abdominal Pain     Review of patient's allergies indicates:  No Known Allergies  HPI Comments: Patient is a 81-year-old male with past medical history of COPD, C. Difficile, and abdominal surgery with an ileostomy in place who presents due to a 4 day history of worsening abdominal distention, bloating, nausea, vomiting, and decreased output from ileostomy.  Patient states that he began feeling bloated on Monday evening around 10 PM.  Patient went to [facility] for similar complaints, however problem resolved on its own as patient had a large bowel movement in the emergency room.  Patient states that he was overall feeling improved on Wednesday however Wednesday evening began having worsening distention and abdominal pain and decreased output from his ostomy.  Patient states that he has been unable to take more than 3 bites by mouth without being full.  Patient also states that he has been nauseated and had 2-3 episodes of vomiting over the past few days.  Patient denies any fevers, chills, chest pain, shortness breath, or any other complaints.          The history is provided by the patient and a relative.     Past Medical History:   Diagnosis Date    C. difficile diarrhea     COPD (chronic obstructive pulmonary disease)      Past Surgical History:   Procedure Laterality Date    ABDOMINAL SURGERY      EYE SURGERY      HERNIA REPAIR      ILEOSTOMY      SPLENECTOMY, TOTAL       History reviewed. No pertinent family history.  Social History   Substance Use Topics    Smoking status: Former Smoker    Smokeless tobacco: None    Alcohol use Yes     Review of Systems   Constitutional: Negative for activity change, chills, fatigue and unexpected weight change.   HENT: Negative for congestion, drooling, ear pain, mouth sores, sneezing and voice change.    Eyes: Negative for pain, discharge and visual disturbance.    Respiratory: Negative for apnea, choking, shortness of breath and wheezing.    Cardiovascular: Negative for chest pain and palpitations.   Gastrointestinal: Positive for abdominal distention, abdominal pain, constipation, nausea and vomiting. Negative for anal bleeding, blood in stool and diarrhea.   Endocrine: Negative for cold intolerance, polydipsia and polyuria.   Genitourinary: Negative for difficulty urinating, enuresis and genital sores.   Musculoskeletal: Negative for arthralgias, gait problem, myalgias and neck stiffness.   Skin: Negative for color change and rash.   Allergic/Immunologic: Negative for environmental allergies.   Neurological: Negative for dizziness, syncope, weakness, numbness and headaches.   Hematological: Negative for adenopathy.   Psychiatric/Behavioral: Negative for agitation and decreased concentration. The patient is not nervous/anxious.        Physical Exam   Initial Vitals   BP Pulse Resp Temp SpO2   05/04/17 0758 05/04/17 0758 05/04/17 0758 05/04/17 0758 05/04/17 0758   159/69 108 16 98.4 °F (36.9 °C) 97 %     Physical Exam    Nursing note and vitals reviewed.  Constitutional: He appears well-developed and well-nourished.   HENT:   Head: Normocephalic and atraumatic.   Eyes: EOM are normal. Pupils are equal, round, and reactive to light.   Neck: Normal range of motion. Neck supple. No thyromegaly present. No tracheal deviation present.   Cardiovascular: Normal rate and regular rhythm. Exam reveals no gallop and no friction rub.    No murmur heard.  Pulmonary/Chest: No stridor. No respiratory distress. He has no wheezes. He has no rales.   Absent breath sounds on right side.    Abdominal: Soft. Bowel sounds are normal. He exhibits distension. There is no tenderness. There is no rebound.   Ostomy bag in place   Musculoskeletal: Normal range of motion.   Neurological: He is alert and oriented to person, place, and time. He displays normal reflexes. No cranial nerve deficit or  sensory deficit.   Skin: Skin is warm and dry.   Psychiatric: He has a normal mood and affect.         ED Course   Procedures  Labs Reviewed   CBC W/ AUTO DIFFERENTIAL - Abnormal; Notable for the following:        Result Value    RBC 4.07 (*)     Hemoglobin 13.1 (*)     Hematocrit 37.2 (*)     MCH 32.2 (*)     Gran # 9.9 (*)     Gran% 81.5 (*)     Lymph% 10.8 (*)     All other components within normal limits   COMPREHENSIVE METABOLIC PANEL - Abnormal; Notable for the following:     Sodium 123 (*)     Potassium 5.4 (*)     CO2 16 (*)     Glucose 118 (*)     BUN, Bld 36 (*)     Total Protein 8.5 (*)     eGFR if  54.9 (*)     eGFR if non  47.4 (*)     All other components within normal limits   LIPASE             Medical Decision Making:   History:   Old Medical Records: I decided to obtain old medical records.       APC / Resident Notes:   Patient is a 81-year-old male with past medical history of COPD, C. Difficile, and abdominal surgery with an ileostomy in place who presents due to a 4 day history of worsening abdominal distention, bloating, nausea, vomiting, and decreased output from ileostomy.  Physical exam reveals male in no acute distress.  Heart regular rate and rhythm.  Lungs reveal absent breath sounds in the right lobe.  Abdomen tender to palpation with distention and ileostomy in place.  Will obtain CBC, CMP, lipase, x-ray flat and erect.     CMP shows sodium of 123, potassium 5.4, CO2 16, glucose 118, BUN 36, creatinine 1.4.  Chest x-ray shows a right tension pneumothorax and a paralytic ileus.  CT surgery was consulted and chest tube was placed.  CT scan was obtained that showed diffuse small bowel dilatation with multiple air-fluid levels, without definitively identified transition point, in this patient with a previous history of total colectomy and ileostomy for C. difficile infection. Likely consistent with either ileus or small bowel obstruction.  General surgery was  consulted and will place NG tube and admit to inpatient.  Plan treatment discussed with attending physician and she is agreeable.       Imaging Results         X-Ray Chest 1 View (Final result) Result time:  05/04/17 14:01:31    Final result by Thomas Harley III, MD (05/04/17 14:01:31)    Narrative:    One view: There is a right tension pneumothorax.  Heart size is normal.  Left lung is clear.  There is aortic plaque.  There is mild paralytic ileus.  There is right lung collapse.      Electronically signed by: THOMAS HARLEY  Date:     05/04/17  Time:    14:01             X-Ray Chest PA And Lateral (Final result) Result time:  05/04/17 11:03:28    Final result by Tracie Staton MD (05/04/17 11:03:28)    Impression:      Right tension pneumothorax.  I identified the finding at 1044 hours on 5/4/2017 and reported it to Dr. Pop at 1057 hours.        Electronically signed by: Tracie Staton MD  Date:     05/04/17  Time:    11:03     Narrative:    Time of Procedure: 05/04/17 10:46:00  Accession # [id number]    Comparison:   Abdominal CT: 5/4/2017, 1030 hrs.  Chest radiograph: 1/4/2016.    Number of views: 2.    Findings:  The patient has very large RIGHT pneumothorax with contralateral displacement of the mediastinum and inferior displacement of the RIGHT hemidiaphragm, radiographic signs of tension pneumothorax.  The RIGHT middle and lower lobes are entirely airless, accounting for the convex contour of the azygoesophageal recess at the level of T9.  The RIGHT upper lobe also appears airless with tethering at the supralateral aspect of the RIGHT hemithorax.    LEFT lung is deeply inflated with reticular character suggesting underlying interstitial lung disease in this patient with a history of COPD.    There is dense calcific atherosclerosis at the level of the aortic arch.  No pulmonary edema identified.    I do not identify LEFT pneumothorax, pneumomediastinum, LEFT pleural fluid or pneumoperitoneum.   Hemostasis clips are present at the GE junction.  Gas-filled loops of bowel noted in the upper abdomen.            CT Abdomen Pelvis With Contrast (Final result)    Abnormal Result time:  05/04/17 12:09:03    Final result by Herberth Jara MD (05/04/17 12:09:03)    Impression:        Diffuse small bowel dilatation with multiple air-fluid levels, without definitively identified transition point, in this patient with a previous history of total colectomy and ileostomy for C. difficile infection. Likely consistent with either ileus or small bowel obstruction.    Large right-sided pneumothorax occupying the entirety of the right chest with shift of the mediastinal structures to the left.  There is a small posterior left pleural bleb, as seen on chest x-ray 5/4/17.    Mild worsened right-sided hydronephrosis.    3.3 cm hyperdense ovoid lesion with linear hyperdense material at the right posterior margin of the hepatic dome, stable from prior.    Other incidental findings as above.      ______________________________________     Electronically signed by resident: HERBERTH JARA  Date:     05/04/17  Time:    11:58            As the supervising and teaching physician, I personally reviewed the images and resident's interpretation and I agree with the findings.          Electronically signed by: ANNY MAXWELL MD  Date:     05/04/17  Time:    12:09     Narrative:    Procedure comments: The patient was surveyed from the lung bases through the pelvis after the administration of 75 cc Omni 350 IV contrast as well as oral contrast and data was reconstructed for coronal, sagittal, and axial images.    Comparison: chest x-ray 5/4/17, abdominal x-ray 5/4/17, CT abdomen pelvis 12/26/2015.    Findings:    The there is a partially visualized large right-sided pneumothorax occupying the entirety of the right chest with compression of the right lung and leftward mediastinal shift. There is a small posterior left pneumothorax or pleural  bleb which appear slightly decreased from prior examination. The visualized portions of the heart appear normal. There is dense coronary artery calcification atherosclerosis.    The liver is normal in size and attenuation with no focal hepatic abnormality. At the posterior margin of the hepatic dome there is a hyperdense ovoid lesion measuring 3.3 cm on today's examination, stable from prior examination. The gallbladder shows no evidence of stones or pericholecystic fluid.  There is no intra-or extrahepatic biliary ductal dilatation.    The adrenal glands are unremarkable. The spleen is surgically absent. The pancreas is atrophic. The stomach is distended with food and air, but otherwise unremarkable.    The kidneys are normal in size and location and concentrate and excrete contrast properly on delayed imaging. There are bilateral renal cysts. There is right-sided mild hydronephrosis, mildly worsened from prior.   Secondary to enlargement and dilatation of the small bowel, the course the ureters is not well-defined. The urinary bladder and prostate demonstrate no significant abnormality.    The abdominal aorta is normal in course and caliber with dense atherosclerotic calcification    The colon is surgically absent. The visualized small bowel shows generalized dilatation with multiple air-fluid levels within its course. No transition point is definitely identified within the small bowel. The small bowel immediately proximal to the ostomy appears decompressed.   There is no ascites, free fluid, or intraperitoneal free air. There is no evidence of lymph node enlargement in the abdomen or pelvis.    Osseous structures remarkable for degenerative changes.  The extraperitoneal soft tissues are unremarkable.            X-Ray Abdomen Flat And Erect (Final result) Result time:  05/04/17 09:53:17    Final result by Thomas Fox III, MD (05/04/17 09:53:17)    Narrative:    Findings: There is a right tension pneumothorax.   There is paralytic ileus.  No perforation seen.      Electronically signed by: WEINITIN HARLEY  Date:     05/04/17  Time:    09:53                   Attending Attestation:     Physician Attestation Statement for NP/PA:   I have conducted a face to face encounter with this patient in addition to the NP/PA, due to Medical Complexity    Other NP/PA Attestation Additions:    History of Present Illness: 80 y/o M h/o COPD, s/p colectomy with ileostomy presents with abdominal pain and distension with nausea and vomiting for the past 3 days. No CP. No SOB    Medical Decision Making: SBO, pSBO, gastroenteritis         Attending ED Notes:   10:19 AM  AAS shows pneumonthorax.  Pt hemodynamically stable. Denies CP and SOB.  CXR ordered and CT surgery consulted.    12:00 PM  Pigtail catheter placed decompression Rt pneumothorax  General surgery consulted . Anticipate admission to gen surg          ED Course     Clinical Impression:   Diagnoses of Abdominal pain, Pneumothorax, and Hyperkalemia were pertinent to this visit.    Disposition:   Disposition: Admitted  Condition: Stable       Sunshine Stout PA-C  05/04/17 9325       Kathryn Pop MD  05/04/17 8018

## 2017-05-04 NOTE — ED TRIAGE NOTES
Tuesday, only small amount of drainage from ileostomy, was seen at [facility], symptoms resolved.  Since yesterday, loss of appetite and feeling full.  Also with some liquid brown stool from rectum.  Pt also today with vomiting.

## 2017-05-04 NOTE — PROGRESS NOTES
Called to patient's bedside for singular episode of hemoptysis. Patient reported coughing up a small amount mucous and blood. The tissue was present in the room and I examined it. It appeared to be fresh blood. In the setting of this patient with known recent pneumothorax and chest tube insertion earlier in the day it is quite possible for the bloody sputum to be a consequence of the procedure. He is hemodynamically stable, resting comfortably in bed, talking and answering questions appropriately. I used a 30cc syringe to remove ~200mL of air out of his chest tube to encourage further expansion of his lung on the R side. Will continue to monitor.       Terry Pool MD  General Surgery PGY1

## 2017-05-04 NOTE — IP AVS SNAPSHOT
[facility]  [address]  [facility] [address]  Phone: [phone]           Patient Discharge Instructions   Our goal is to set you up for success. This packet includes information on your condition, medications, and your home care.  It will help you care for yourself to prevent having to return to the hospital.     Please ask your nurse if you have any questions.      There are many details to remember when preparing to leave the hospital. Here is what you will need to do:    1. Take your medicine. If you are prescribed medications, review your Medication List on the following pages. You may have new medications to  at the pharmacy and others that you'll need to stop taking. Review the instructions for how and when to take your medications. Talk with your doctor or nurses if you are unsure of what to do.     2. Go to your follow-up appointments. Specific follow-up information is listed in the following pages. Your may be contacted by a nurse or clinical provider about future appointments. Be sure we have all of the phone numbers to reach you. Please contact your provider's office if you are unable to make an appointment.     3. Watch for warning signs. Your doctor or nurse will give you detailed warning signs to watch for and when to call for assistance. These instructions may also include educational information about your condition. If you experience any of warning signs to your health, call your doctor.           [facility]  Unless otherwise directed by your provider, please   contact [facility], our nurse care line   that is available for 24/7 assistance.     [phone] (toll-free)     Registered nurses in the [facility]   provide: appointment scheduling, clinical advisement, health education, and other advisory services.                  ** Verify the list of medication(s) below is accurate and up to date. Carry this with you in case of  emergency. If your medications have changed, please notify your healthcare provider.             Medication List      CONTINUE taking these medications        Additional Info                      dicyclomine 20 mg tablet   Commonly known as:  BENTYL   Quantity:  20 tablet   Refills:  0   Dose:  20 mg    Instructions:  Take 1 tablet (20 mg total) by mouth 2 (two) times daily.     Begin Date    AM    Noon    PM    Bedtime       escitalopram oxalate 10 MG tablet   Commonly known as:  LEXAPRO   Quantity:  30 tablet   Refills:  11   Dose:  10 mg    Instructions:  Take 1 tablet (10 mg total) by mouth once daily.     Begin Date    AM    Noon    PM    Bedtime       pantoprazole 20 MG tablet   Commonly known as:  PROTONIX   Refills:  0   Dose:  20 mg    Instructions:  Take 20 mg by mouth once daily.     Begin Date    AM    Noon    PM    Bedtime       PROAIR HFA 90 mcg/actuation inhaler   Refills:  0   Generic drug:  albuterol      Begin Date    AM    Noon    PM    Bedtime       simethicone 80 MG chewable tablet   Commonly known as:  MYLICON   Refills:  0   Dose:  80 mg    Instructions:  Take 80 mg by mouth every 6 (six) hours as needed for Flatulence.     Begin Date    AM    Noon    PM    Bedtime       trazodone 50 MG tablet   Commonly known as:  DESYREL   Quantity:  30 tablet   Refills:  0   Dose:  50 mg    Instructions:  Take 1 tablet (50 mg total) by mouth every evening.     Begin Date    AM    Noon    PM    Bedtime                  Please bring to all follow up appointments:    1. A copy of your discharge instructions.  2. All medicines you are currently taking in their original bottles.  3. Identification and insurance card.    Please arrive 15 minutes ahead of scheduled appointment time.    Please call 24 hours in advance if you must reschedule your appointment and/or time.        Follow-up Information     Follow up with Joshua Goldberg, MD.    Specialty:  General Surgery    Why:  As needed    Contact information:     "[address]  [facility] [address]  [phone]          Discharge Instructions     Future Orders    Activity as tolerated     Call MD for:  difficulty breathing or increased cough     Call MD for:  increased confusion or weakness     Call MD for:  persistent nausea and vomiting or diarrhea     Call MD for:  severe uncontrolled pain     Call MD for:  temperature >100.4     Call MD for:  worsening rash     Diet general     Questions:    Total calories:      Fat restriction, if any:      Protein restriction, if any:      Na restriction, if any:      Fluid restriction:      Additional restrictions:          Primary Diagnosis     Your primary diagnosis was:  Abdominal Pain      Admission Information     Date & Time Provider Department CSN    5/4/2017  7:59 AM Joshua Goldberg, MD [facility] [id number]      Care Providers     Provider Role Specialty Primary office phone    Joshua Goldberg, MD Attending Provider General Surgery [phone]    Cuong Askew MD Consulting Physician  Cardiothoracic Surgery  [phone]      Your Vitals Were     BP Pulse Temp Resp Height Weight    112/67 (BP Location: Right arm, Patient Position: Sitting, BP Method: Automatic) 97 99.1 °F (37.3 °C) (Oral) 18 5' 7" (1.702 m) 49 kg (108 lb)    SpO2 BMI             97% 16.92 kg/m2         Recent Lab Values     No lab values to display.      Allergies as of 5/5/2017     No Known Allergies      Advance Directives     An advance directive is a document which, in the event you are no longer able to make decisions for yourself, tells your healthcare team what kind of treatment you do or do not want to receive, or who you would like to make those decisions for you.  If you do not currently have an advance directive, [facility] encourages you to create one.  For more information call:  [phone]), [phone] ([phone]),  or log on to [email or web address].        Smoking Cessation     If you would " like to quit smoking:   You may be eligible for free services if you are a [place] resident and started smoking cigarettes before September 1, 1988.  Call the Smoking Cessation Trust (SCT) toll free at (079) 773-5876 or (949) 159-3450.   Call 1-800-QUIT-NOW if you do not meet the above criteria.   Contact us via email: [email or web address]   View our website for more information: [email or web address]        Language Assistance Services     ATTENTION: Language assistance services are available, free of charge. Please call [phone].      ATENCIÓN: Si habla español, tiene a marshall disposición servicios gratuitos de asistencia lingüística. Llame al [phone].     CHÚ Ý: N?u b?n nói Ti?ng Vi?t, có các d?ch v? h? tr? ngôn ng? mi?n phí dành cho b?n. G?i s? [phone].         [facility] complies with applicable Federal civil rights laws and does not discriminate on the basis of race, color, national origin, age, disability, or sex.

## 2017-05-04 NOTE — ED NOTES
LOC: The patient is awake, alert and aware of environment with an appropriate affect, the patient is oriented x 3 and speaking appropriately.  APPEARANCE: Patient resting comfortably and in no acute distress, patient is clean and well groomed, patient's clothing is properly fastened.  SKIN: The skin is warm and dry, color consistent with ethnicity, patient has normal skin turgor and moist mucus membranes, skin intact, no breakdown or bruising noted.  MUSCULOSKELETAL: Patient moving all extremities spontaneously, no obvious swelling or deformities noted.  RESPIRATORY: Airway is open and patent, respirations are spontaneous, patient has a normal effort and rate, no accessory muscle use noted.  ABDOMEN: Tender to palpation all quads, + distention noted with soft bulge to LLQ, hyperactive bowel sounds present in all four quadrants, ileostomy to RUQ with small amount of light brown stool.

## 2017-05-04 NOTE — CONSULTS
[facility]  General Surgery  History & Physical    Patient Name: Moises Hernandez  MRN: [id number]  Admission Date: 5/4/2017  Attending Physician: Kathryn Pop MD   Primary Care Provider: Rodriguez Castro MD    Patient information was obtained from patient and relative(s).     Subjective:     Chief Complaint/Reason for Admission: decreased    History of Present Illness:  Patient is a 81 y.o. 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 Patient denies any nausea or vomiting but his daughter states that his PO intake has been decreased lately. She also states that this happened previously on Monday but that he had a significant bowel movement in the ED. He is not complaining of much abdominal pain at the moment. Of note, he also had an incidental right sided pneumothorax on CT.    No current facility-administered medications on file prior to encounter.      Current Outpatient Prescriptions on File Prior to Encounter   Medication Sig    dicyclomine (BENTYL) 20 mg tablet Take 1 tablet (20 mg total) by mouth 2 (two) times daily.    escitalopram oxalate (LEXAPRO) 10 MG tablet Take 1 tablet (10 mg total) by mouth once daily.    pantoprazole (PROTONIX) 20 MG tablet Take 20 mg by mouth once daily.    PROAIR HFA 90 mcg/actuation inhaler     simethicone (MYLICON) 80 MG chewable tablet Take 80 mg by mouth every 6 (six) hours as needed for Flatulence.    trazodone (DESYREL) 50 MG tablet Take 1 tablet (50 mg total) by mouth every evening.       Review of patient's allergies indicates:  No Known Allergies    Past Medical History:   Diagnosis Date    C. difficile diarrhea     COPD (chronic obstructive pulmonary disease)      Past Surgical History:   Procedure Laterality Date    ABDOMINAL SURGERY      EYE SURGERY      HERNIA REPAIR      ILEOSTOMY      SPLENECTOMY, TOTAL       Family  History     None        Social History Main Topics    Smoking status: Former Smoker    Smokeless tobacco: Not on file    Alcohol use Yes    Drug use: Not on file    Sexual activity: Not on file     Review of Systems   Constitutional: Positive for appetite change and fatigue. Negative for activity change, fever and unexpected weight change.   Respiratory: Negative for cough and shortness of breath.    Cardiovascular: Negative for chest pain and palpitations.   Gastrointestinal: Positive for constipation. Negative for abdominal distention, abdominal pain, blood in stool, diarrhea, nausea and vomiting.   Endocrine: Negative for cold intolerance and heat intolerance.   Genitourinary: Negative for difficulty urinating.   Musculoskeletal: Negative for arthralgias and myalgias.   Neurological: Negative for dizziness and light-headedness.     Objective:     Vital Signs (Most Recent):  Temp: 97.6 °F (36.4 °C) (05/04/17 0902)  Pulse: 93 (05/04/17 1122)  Resp: 16 (05/04/17 1122)  BP: 134/77 (05/04/17 1122)  SpO2: 97 % (05/04/17 1101) Vital Signs (24h Range):  Temp:  [97.6 °F (36.4 °C)-98.4 °F (36.9 °C)] 97.6 °F (36.4 °C)  Pulse:  [] 93  Resp:  [16-20] 16  SpO2:  [97 %-98 %] 97 %  BP: (117-159)/(69-87) 134/77     Weight: 49 kg (108 lb)  Body mass index is 16.92 kg/(m^2).    Physical Exam   Constitutional: He appears well-developed and well-nourished. No distress.   HENT:   Head: Normocephalic and atraumatic.   Cardiovascular: Normal rate and regular rhythm.    Pulmonary/Chest: Effort normal. No respiratory distress (decreased breath sounds on the right).   Abdominal: Distention: mild distension. There is no tenderness. There is no rebound and no guarding.   Neurological: He is alert.   Skin: Skin is warm and dry.   Psychiatric: He has a normal mood and affect. His behavior is normal.       Significant Labs:  CBC:   Recent Labs  Lab 05/04/17  0854   WBC 12.13   RBC 4.07*   HGB 13.1*   HCT 37.2*      MCV 91    MCH 32.2*   MCHC 35.2     CMP:   Recent Labs  Lab 05/04/17  0854   *   CALCIUM 9.8   ALBUMIN 4.1   PROT 8.5*   *   K 5.4*   CO2 16*   CL 95   BUN 36*   CREATININE 1.4   ALKPHOS 103   ALT 19   AST 32   BILITOT 0.6       Significant Diagnostics:  CT:  Diffuse small bowel dilatation with multiple air-fluid levels, without definitively identified transition point, in this patient with a previous history of total colectomy and ileostomy for C. difficile infection. Likely consistent with either ileus or small bowel obstruction.    Large right-sided pneumothorax occupying the entirety of the right chest with shift of the mediastinal structures to the left.  There is a small posterior left pleural bleb, as seen on chest x-ray 5/4/17.    Mild worsened right-sided hydronephrosis.    3.3 cm hyperdense ovoid lesion with linear hyperdense material at the right posterior margin of the hepatic dome, stable from prior.    Other incidental findings as above.    Assessment/Plan:     There are no hospital problems to display for this patient.    VTE Risk Mitigation     None        Admit to general surgery  NPO  NG tube  MIVF  Chest tube to be placed by thoracic surgery  Daily labs  Replace nigel Smith MD  General Surgery  [facility]

## 2017-05-04 NOTE — PROCEDURES
[facility]  Chest Tube Insertion  Procedure Note    SUMMARY     Date of Procedure: 5/4/17    Procedure: Chest Tube Insertion    Assisting Surgeon: Dr. Jabier Calero    Indications:  Clinically significant Pneumothorax    Pre-Operative Diagnosis: Pneumothorax    Post-Operative Diagnosis: Pneumothorax    Anesthesia: Local anesthesia    Technical Procedures Used: Insertion of Shy catheter    Description of the Findings of the Procedure: 8.5 Fr Furhman catheter placed in standard fashion between the 4th and 5th rib space.     Informed consent was obtained for the procedure, including sedation.  Risks of lung perforation, hemorrhage, arrhythmia, and adverse drug reaction were discussed.     After sterile skin prep and administration of 6 mL of 1% lidocaine solution and 8.5 Fr pigtailed catheter was placed inferior to the 4th rib in the mid axillary line using standard technique.    The catheter was sutured in place and connected to a pneumostat to drain. There were no complications and the patient tolerated the procedure well.       Significant Surgical Tasks Conducted by the Assistant(s), if Applicable: none    Complications: None; patient tolerated the procedure well.    Total IV Fluids: 0ml    Estimated Blood Loss (EBL): Minimal           Drains: 8.5 Fr Shy catheter    Implants: none    Specimens: None            Condition: stable    Disposition: patient transferred to the floor from the ED      Terry Pool MD  General Surgery PGY1  [phone]

## 2017-05-04 NOTE — CONSULTS
[facility]  Cardiothoracic Surgery  Consult Note    Inpatient consult to Cardiothoracic Surgery  Consult performed by: MARSHALL AGUILERA  Consult ordered by: DAKOTAH YBARRA        Subjective:     Chief Complaint/Reason for Admission: abdominal pain    History of Present Illness: 80 yo male with history of COPD, c.diff colitis with toxic megacolon requiring TAC w/end ileostomy in 01/2017 presented to the ED with the complaint of abdominal pain and distention. Workup revealed a significant R sided pneumothorax on CXR without respiratory distress. The patient had a previous right sided pneumothorax in December of 2015 which was treated with a chest tube. He did not have any chest pain, shortness of breath greater than his baseline, or cough. He was hemodynamically stable upon arrival to the ED. Cardiothoracic surgery was consulted for evaluation and management of pneumothorax.     No current facility-administered medications on file prior to encounter.      Current Outpatient Prescriptions on File Prior to Encounter   Medication Sig    dicyclomine (BENTYL) 20 mg tablet Take 1 tablet (20 mg total) by mouth 2 (two) times daily.    escitalopram oxalate (LEXAPRO) 10 MG tablet Take 1 tablet (10 mg total) by mouth once daily.    pantoprazole (PROTONIX) 20 MG tablet Take 20 mg by mouth once daily.    PROAIR HFA 90 mcg/actuation inhaler     simethicone (MYLICON) 80 MG chewable tablet Take 80 mg by mouth every 6 (six) hours as needed for Flatulence.    trazodone (DESYREL) 50 MG tablet Take 1 tablet (50 mg total) by mouth every evening.       Review of patient's allergies indicates:  No Known Allergies    Past Medical History:   Diagnosis Date    C. difficile diarrhea     COPD (chronic obstructive pulmonary disease)      Past Surgical History:   Procedure Laterality Date    ABDOMINAL SURGERY      EYE SURGERY      HERNIA REPAIR      ILEOSTOMY      SPLENECTOMY, TOTAL       Family History      None        Social History Main Topics    Smoking status: Former Smoker    Smokeless tobacco: Not on file    Alcohol use Yes    Drug use: Not on file    Sexual activity: Not on file     Review of Systems   Constitutional: Positive for appetite change. Negative for activity change, chills and fever.   HENT: Negative.    Eyes: Negative.    Respiratory: Negative for cough, chest tightness and shortness of breath.    Cardiovascular: Negative for chest pain.   Gastrointestinal: Positive for abdominal distention and abdominal pain.   Endocrine: Negative.    Genitourinary: Negative.    Musculoskeletal: Negative.    Skin: Negative.    Neurological: Negative.    Psychiatric/Behavioral: Negative.      Objective:     Vital Signs (Most Recent):  Temp: 97.6 °F (36.4 °C) (05/04/17 0902)  Pulse: 78 (05/04/17 1332)  Resp: 16 (05/04/17 1122)  BP: 136/76 (05/04/17 1332)  SpO2: 97 % (05/04/17 1332) Vital Signs (24h Range):  Temp:  [97.6 °F (36.4 °C)-98.4 °F (36.9 °C)] 97.6 °F (36.4 °C)  Pulse:  [] 78  Resp:  [16-20] 16  SpO2:  [97 %-98 %] 97 %  BP: (117-159)/(69-87) 136/76     Weight: 49 kg (108 lb)  Body mass index is 16.92 kg/(m^2).    No intake or output data in the 24 hours ending 05/04/17 1354    Physical Exam   Constitutional: He is oriented to person, place, and time.   Cachectic older gentleman in no acute distress   HENT:   Head: Normocephalic and atraumatic.   Eyes: EOM are normal. No scleral icterus.   Neck: Normal range of motion. No tracheal deviation present. No thyromegaly present.   Cardiovascular: Normal rate and regular rhythm.    Pulmonary/Chest: No respiratory distress.   Absent breath sounds on the right   Abdominal: He exhibits distension. There is no guarding.   Musculoskeletal: Normal range of motion.   Neurological: He is alert and oriented to person, place, and time.   Skin: Skin is warm and dry.   Psychiatric: He has a normal mood and affect.       Significant Labs:  BMP:     Recent Labs  Lab  05/04/17  0854   *   *   K 5.4*   CL 95   CO2 16*   BUN 36*   CREATININE 1.4   CALCIUM 9.8     CBC:     Recent Labs  Lab 05/04/17  0854   WBC 12.13   RBC 4.07*   HGB 13.1*   HCT 37.2*      MCV 91   MCH 32.2*   MCHC 35.2     CMP:     Recent Labs  Lab 05/04/17  0854   *   CALCIUM 9.8   ALBUMIN 4.1   PROT 8.5*   *   K 5.4*   CO2 16*   CL 95   BUN 36*   CREATININE 1.4   ALKPHOS 103   ALT 19   AST 32   BILITOT 0.6       Significant Diagnostics:  CXR: significant pneumothorax on right side, does not appear to be major mediastinal shift, trace pleural effusion    Assessment/Plan:     Active Diagnoses:    Diagnosis Date Noted POA    Abdominal pain [R10.9] 05/04/2017 Yes    Pneumothorax [J93.9] 05/04/2017 Unknown    COPD (chronic obstructive pulmonary disease) with acute bronchitis [J44.0]  Yes      Problems Resolved During this Admission:    Diagnosis Date Noted Date Resolved POA     - Furhman catheter placed at bedside in ED, will follow up CXR  - Catheter may remain in place until R lung fully expands  - Will continue to follow     Terry Pool MD  General Surgery  [facility]

## 2017-05-04 NOTE — ED NOTES
Assumed care. Pt awake and alert, resting in bed. No acute distress noted. Respirations appear even and unlabored. On continuous cardiac, BP, and pulse ox monitor. Side rails up x2. Call light within reach. Family at bedside. All updated on plan of care- waiting on CTS to come evaluate patient. Will continue to monitor.

## 2017-05-04 NOTE — ED NOTES
Pt reports relief of nausea at present.  Daughter remains at bedside.  Siderails up x 2/call light in reach.

## 2017-05-05 PROBLEM — K56.609 SMALL BOWEL OBSTRUCTION: Status: ACTIVE | Noted: 2017-01-01

## 2017-05-05 NOTE — PROGRESS NOTES
[facility]  General Surgery  Progress Note    Subjective:     Interval History: No Pain. Had large volume of ileostomy output with improved distention. No nausea or vomiting. A VSS.    Post-Op Info:  * No surgery found *          Medications:  Continuous Infusions:   Scheduled Meds:   magnesium sulfate IVPB  2 g Intravenous Once    sodium chloride 0.9%  3 mL Intravenous Q8H     PRN Meds:ondansetron     Objective:     Vital Signs (Most Recent):  Temp: 99.1 °F (37.3 °C) (05/05/17 0400)  Pulse: 97 (05/05/17 0400)  Resp: 18 (05/05/17 0400)  BP: 112/67 (05/05/17 0400)  SpO2: 97 % (05/05/17 0400) Vital Signs (24h Range):  Temp:  [98.1 °F (36.7 °C)-99.1 °F (37.3 °C)] 99.1 °F (37.3 °C)  Pulse:  [78-97] 97  Resp:  [16-18] 18  SpO2:  [96 %-98 %] 97 %  BP: (112-143)/(66-77) 112/67       Intake/Output Summary (Last 24 hours) at 05/05/17 0936  Last data filed at 05/05/17 0100   Gross per 24 hour   Intake          1316.67 ml   Output                0 ml   Net          1316.67 ml       Physical Exam  NAD  RRR  Abd soft, NT/ND, ileostomy with liquid stool present    Significant Labs:  CBC:   Recent Labs  Lab 05/05/17  0407   WBC 9.43   RBC 3.71*   HGB 11.5*   HCT 34.4*      MCV 93   MCH 31.0   MCHC 33.4     CMP:   Recent Labs  Lab 05/04/17  0854  05/05/17  0408   *  < > 87   CALCIUM 9.8  < > 8.6*   ALBUMIN 4.1  --   --    PROT 8.5*  --   --    *  < > 131*   K 5.4*  < > 4.1   CO2 16*  < > 21*   CL 95  < > 103   BUN 36*  < > 24*   CREATININE 1.4  < > 1.0   ALKPHOS 103  --   --    ALT 19  --   --    AST 32  --   --    BILITOT 0.6  --   --    < > = values in this interval not displayed.  Coagulation: No results for input(s): INR, APTT in the last 48 hours.    Invalid input(s): PT    Assessment/Plan:   80 yo M with pSBO, resolved  -Diet as tolerated  -SLIV  -Anticipate DC today    Active Diagnoses:    Diagnosis Date Noted POA    PRINCIPAL PROBLEM:  Abdominal pain [R10.9] 05/04/2017 Yes     Pneumothorax [J93.9] 05/04/2017 Unknown    COPD (chronic obstructive pulmonary disease) with acute bronchitis [J44.0]  Yes      Problems Resolved During this Admission:    Diagnosis Date Noted Date Resolved YUMIKO You MD  General Surgery  [facility]

## 2017-05-05 NOTE — PROGRESS NOTES
Pt discharged. Instructions given and explained. Pt verbalized understanding with no questions. Pt AAOx3, waiting on Magnesium to complete.

## 2017-05-05 NOTE — PLAN OF CARE
Problem: Fall Risk (Adult)  Goal: Identify Related Risk Factors and Signs and Symptoms  Related risk factors and signs and symptoms are identified upon initiation of Human Response Clinical Practice Guideline (CPG)   Outcome: Ongoing (interventions implemented as appropriate)  Pt. And daughter able to verbalize understanding of POC.  Pt. VSS, chest tube remains without drainage.  Pt. Continues to deny pain.  Pt. Remains npo, will continue to monitor

## 2017-05-05 NOTE — PROGRESS NOTES
Progress Note    Admit Date: 5/4/2017   LOS: 1 day     SUBJECTIVE:     NAEON. Minimal right lung expansion with pigtail catheter placement yesterday. 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 Workup revealed a significant R sided pneumothorax on CXR without respiratory distress. The patient had a previous right sided pneumothorax in December of 2015 which was treated with a chest tube. He did not have any chest pain, shortness of breath greater than his baseline, or cough. He was hemodynamically stable upon arrival to the ED. Cardiothoracic surgery was consulted for evaluation and management of pneumothorax.      Scheduled Meds:   magnesium sulfate IVPB  2 g Intravenous Once    sodium chloride 0.9%  3 mL Intravenous Q8H     Continuous Infusions:   PRN Meds:ondansetron    Review of patient's allergies indicates:  No Known Allergies    Review of Systems  Constitutional: Positive for appetite change. Negative for activity change, chills and fever.   HENT: Negative.   Eyes: Negative.   Respiratory: Negative for cough, chest tightness and shortness of breath.   Cardiovascular: Negative for chest pain.   Gastrointestinal: Positive for abdominal distention and abdominal pain.   Endocrine: Negative.   Genitourinary: Negative.   Musculoskeletal: Negative.   Skin: Negative.   Neurological: Negative.   Psychiatric/Behavioral: Negative.     OBJECTIVE:     Vital Signs (Most Recent)  Temp: 99.1 °F (37.3 °C) (05/05/17 0400)  Pulse: 97 (05/05/17 0400)  Resp: 18 (05/05/17 0400)  BP: 112/67 (05/05/17 0400)  SpO2: 97 % (05/05/17 0400)    Vital Signs Range (Last 24H):  Temp:  [97.6 °F (36.4 °C)-99.1 °F (37.3 °C)]   Pulse:  [78-97]   Resp:  [16-20]   BP: (112-155)/(66-87)   SpO2:  [96 %-98 %]     I & O (Last 24H):  Intake/Output  Summary (Last 24 hours) at 05/05/17 0857  Last data filed at 05/05/17 0100   Gross per 24 hour   Intake          1316.67 ml   Output                0 ml   Net          1316.67 ml     Physical Exam:  Constitutional: He is oriented to person, place, and time.   Cachectic older gentleman in no acute distress   HENT:   Head: Normocephalic and atraumatic.   Eyes: EOM are normal. No scleral icterus.   Neck: Normal range of motion. No tracheal deviation present. No thyromegaly present.   Cardiovascular: Normal rate and regular rhythm.   Pulmonary/Chest: No respiratory distress.   Absent breath sounds on the right   Abdominal: He exhibits distension. There is no guarding.   Musculoskeletal: Normal range of motion.   Neurological: He is alert and oriented to person, place, and time.   Skin: Skin is warm and dry.   Psychiatric: He has a normal mood and affect.     Laboratory:  CBC:   Recent Labs  Lab 05/05/17  0407   WBC 9.43   RBC 3.71*   HGB 11.5*   HCT 34.4*      MCV 93   MCH 31.0   MCHC 33.4     BMP:   Recent Labs  Lab 05/05/17  0408   GLU 87   *   K 4.1      CO2 21*   BUN 24*   CREATININE 1.0   CALCIUM 8.6*   MG 1.8     CMP:   Recent Labs  Lab 05/04/17  0854  05/05/17  0408   *  < > 87   CALCIUM 9.8  < > 8.6*   ALBUMIN 4.1  --   --    PROT 8.5*  --   --    *  < > 131*   K 5.4*  < > 4.1   CO2 16*  < > 21*   CL 95  < > 103   BUN 36*  < > 24*   CREATININE 1.4  < > 1.0   ALKPHOS 103  --   --    ALT 19  --   --    AST 32  --   --    BILITOT 0.6  --   --    < > = values in this interval not displayed.  Coagulation: No results for input(s): INR, APTT in the last 168 hours.    Invalid input(s): PT    Diagnostic Results:  Labs: Reviewed  X-Ray: Reviewed  Right pneumothorax remains. Slightly more expanded than yesterday.    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 Right pneumothorax appears to be chronic in nature and will not resolve with chest tube. Patient is not a candidate for thoracotomy and total decortication at this time due to malnourishment.   - Patient is stable on room air with oxygen saturations in the high 90s. Okay for discharge when deemed appropriate by primary team.   - Please call with any questions or concerns.     Kylie Matamoros PA-C  Thoracic Surgery  [phone]

## 2017-05-05 NOTE — PLAN OF CARE
"Currently patient is living at sons' home, 1 story house, w/him, his spouse, 2 grandchildren, & daughter. Daughter at ;she states,"he lives w/me, we are having out home renovated;I take care of him." Discharging to home w/no needs today.     [facility] Packet given to patient after informed about it;patient verbalized their understanding.        05/05/17 1140   Discharge Assessment   Assessment Type Discharge Planning Assessment   Confirmed/corrected address and phone number on facesheet? Yes   Assessment information obtained from? Patient;Medical Record;Other  (Daughter)   Expected Length of Stay (days) (1)   Communicated expected length of stay with patient/caregiver yes   Type of Healthcare Directive Received (Unknown)   Prior to hospitilization cognitive status: Alert/Oriented;No Deficits   Prior to hospitalization functional status: Independent   Current cognitive status: Alert/Oriented;No Deficits   Current Functional Status: Independent;Assistive Equipment;Needs Assistance   Arrived From home or self-care  (Via ER)   Lives With child(inna), adult;other relative(s)  (Currently living at son's home, w/sons' spouse, sons' 2 kids, daughter)   Able to Return to Prior Arrangements yes   Is patient able to care for self after discharge? Yes   How many people do you have in your home that can help with your care after discharge? 3   Who are your caregiver(s) and their phone number(s)? (Daughter: Laura DAVILA [phone], Son: Preston DAVLIA [phone], Daughter-in-law no name nor # given. )   Patient's perception of discharge disposition home or selfcare   Readmission Within The Last 30 Days no previous admission in last 30 days   Patient currently being followed by outpatient case management? No   Patient currently receives home health services? No   Does the patient currently use HME? Yes   Name and contact number for HME provider: (Ileostomy supplies via [employer])   Patient currently receives private " duty nursing? N/A   Patient currently receives any other outside agency services? No   Equipment Currently Used at Home cane, straight;colostomy/ostomy supplies;walker, standard  (Ileostomy;doesn't use walker now. )   Do you have any problems affording any of your prescribed medications? No   Is the patient taking medications as prescribed? yes   Do you have any financial concerns preventing you from receiving the healthcare you need? No   Does the patient have transportation to healthcare appointments? Yes   Transportation Available car;family or friend will provide   On Dialysis? No   Does the patient receive services at the Coumadin Clinic? No   Are there any open cases? No   Discharge Plan A Home with family   Discharge Plan B Home with family   Patient/Family In Agreement With Plan yes

## 2017-05-05 NOTE — DISCHARGE SUMMARY
[facility]  General Surgery  Discharge Summary      Patient Name: Moises Hernandez  MRN: [id number]  Admission Date: 5/4/2017  Hospital Length of Stay: 1 days  Discharge Date and Time:  05/05/2017 9:46 AM  Attending Physician: Joshua Goldberg, MD   Discharging Provider: Jennifer Quinones MD  Primary Care Provider: Rodriguze Castro MD     HPI: Patient is a 81 y.o. 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 Patient denies any nausea or vomiting but his daughter states that his PO intake has been decreased lately. She also states that this happened previously on Monday but that he had a significant bowel movement in the ED. He is not complaining of much abdominal pain at the moment. Of note, he also had an incidental right sided pneumothorax on CT.    * No surgery found *     Hospital Course: The patient's abdominal pain and distention resolved as he had copious output from the ileostomy. He tolerated a diet. Thoracic surgery was consulted for the pneumothorax and placed a pigtail catheter with little change on CXR. The patient was asymptomatic on room air and the catheter was removed. The pneumothorax was thought to be chronic in nature and he is not a surgical candidate.     Consults:   Consults         Status Ordering Provider     Inpatient consult to Cardiothoracic Surgery  Once     Provider:  (Not yet assigned)    Completed CHLOE GAUTHIER     Inpatient consult to General surgery  Once     Provider:  (Not yet assigned)    CHLOE Mckenna          Significant Diagnostic Studies: Labs:   CBC   Recent Labs  Lab 05/04/17  0854 05/05/17  0407   WBC 12.13 9.43   HGB 13.1* 11.5*   HCT 37.2* 34.4*    299       Pending Diagnostic Studies:     None        Final Active Diagnoses:    Diagnosis Date Noted POA    PRINCIPAL PROBLEM:  Abdominal pain [R10.9] 05/04/2017 Yes     Pneumothorax [J93.9] 05/04/2017 Unknown    COPD (chronic obstructive pulmonary disease) with acute bronchitis [J44.0]  Yes      Problems Resolved During this Admission:    Diagnosis Date Noted Date Resolved POA      Discharged Condition: stable    Disposition: Home or Self Care    Follow Up:  Follow-up Information     Follow up with Joshua Goldberg, MD.    Specialty:  General Surgery    Why:  As needed    Contact information:    [address]  [facility] [address]  [phone]          Patient Instructions:     Diet general     Activity as tolerated     Call MD for:  temperature >100.4     Call MD for:  persistent nausea and vomiting or diarrhea     Call MD for:  severe uncontrolled pain     Call MD for:  worsening rash     Call MD for:  difficulty breathing or increased cough     Call MD for:  increased confusion or weakness       Medications:  Reconciled Home Medications:   Current Discharge Medication List      CONTINUE these medications which have NOT CHANGED    Details   dicyclomine (BENTYL) 20 mg tablet Take 1 tablet (20 mg total) by mouth 2 (two) times daily.  Qty: 20 tablet, Refills: 0      escitalopram oxalate (LEXAPRO) 10 MG tablet Take 1 tablet (10 mg total) by mouth once daily.  Qty: 30 tablet, Refills: 11      pantoprazole (PROTONIX) 20 MG tablet Take 20 mg by mouth once daily.      PROAIR HFA 90 mcg/actuation inhaler       simethicone (MYLICON) 80 MG chewable tablet Take 80 mg by mouth every 6 (six) hours as needed for Flatulence.      trazodone (DESYREL) 50 MG tablet Take 1 tablet (50 mg total) by mouth every evening.  Qty: 30 tablet, Refills: 0             Jennifer Quinones MD  General Surgery  [facility]

## 2017-07-21 NOTE — ED PROVIDER NOTES
"Encounter Date: 7/21/2017       History     Chief Complaint   Patient presents with    Weakness     generalized weakness with nausea, vomiting, and diarrhea since yesterday and feels increase wekaness and decrease appetite today.     82 Y/O MALE WITH PMHX OF C-DIFFICILE COLITIS WITH TOXIC MEGACOLON REQUIRING TAC W/END ILEOSTOMY IN JAN 2016 AND COPD PRESENT TO ED WITH GENERALIZED WEAKNESS.  PT REPORTS "I FEEL AS WEAK AS A CHICKEN IN A FRYING PAIN."  LATE Tuesday NIGHT, THE PATIENT BEGAN VOMITING AT 2200.  THIS CONTINUED UNTIL 0600 WED AM.  SINCE THAT TIME, NO REPEAT EPISODES OF VOMITING.  PT DROVE TO THE HOSPITAL WITH HIS DAUGHTER TO SEE HIS PCP WED MORNING.  WHILE IN THE PARKING LOT, HIS OSTOMY BAG LEAKED SO THEY WENT HOME AND MISSED THE APPOINTMENT.  OVER THE NEXT TWO DAYS, PT HAS HAD MILD ABD CRAMPING, LIQUID OSTOMY OUTPUT, AND GENERALIZED WEAKNESS.   PT REPORTS HIS STOOL HAS BECOME MORE FORMED TODAY.  THE PATIENT HAS CHRONICALLY DECREASED APPETITE AND HAS BEEN RX APPETITE STIMULANT BUT IT WAS TOO EXPENSIVE AS INSURANCE WOULDN'T COVER IT.  PT AND HIS DAUGHTER ARE CONCERNED ABOUT DEHYDRATION.  THERE HAS BEEN NO EPISODES OF SYNCOPE, CHEST PAIN, PALPITATIONS OR SOB. NO FALLS.           Review of patient's allergies indicates:  No Known Allergies  Past Medical History:   Diagnosis Date    C. difficile diarrhea     COPD (chronic obstructive pulmonary disease)      Past Surgical History:   Procedure Laterality Date    ABDOMINAL SURGERY      EYE SURGERY      HERNIA REPAIR      ILEOSTOMY      SPLENECTOMY, TOTAL       History reviewed. No pertinent family history.  Social History   Substance Use Topics    Smoking status: Former Smoker    Smokeless tobacco: Not on file    Alcohol use Yes     Review of Systems   Constitutional: Positive for appetite change and fatigue. Negative for chills, diaphoresis and fever.   HENT: Negative for congestion and sore throat.    Eyes: Negative.    Respiratory: Negative for cough, " chest tightness and shortness of breath.    Cardiovascular: Negative for chest pain and palpitations.   Gastrointestinal: Positive for abdominal pain. Negative for abdominal distention, diarrhea, nausea and vomiting.        ABDOMINAL CRAMPING   Endocrine: Negative.    Genitourinary: Negative for difficulty urinating, dysuria, flank pain and frequency.   Musculoskeletal: Negative for back pain, myalgias and neck pain.   Skin: Negative for pallor and rash.   Allergic/Immunologic: Negative for immunocompromised state.   Neurological: Positive for weakness. Negative for dizziness, syncope, facial asymmetry and numbness.        GENERALIZED WEAKNESS   Hematological: Does not bruise/bleed easily.   Psychiatric/Behavioral: Negative for agitation and confusion.   All other systems reviewed and are negative.      Physical Exam     Initial Vitals [07/21/17 1350]   BP Pulse Resp Temp SpO2   137/72 100 16 98.3 °F (36.8 °C) 98 %      MAP       93.67         Physical Exam    Nursing note and vitals reviewed.  Constitutional: He appears well-developed and well-nourished. He is not diaphoretic. No distress.   HENT:   Head: Normocephalic and atraumatic.   DRY MM   Eyes: Conjunctivae and EOM are normal. No scleral icterus.   Neck: Normal range of motion. Neck supple.   Cardiovascular: Normal rate, regular rhythm and normal heart sounds. Exam reveals no gallop and no friction rub.    No murmur heard.  Pulmonary/Chest: Breath sounds normal. No respiratory distress. He has no wheezes. He has no rhonchi. He has no rales.   Abdominal: Soft. Bowel sounds are normal. He exhibits no distension. There is no tenderness.   ILEOSTOMY NOTED WITH BROWN STOOL   Musculoskeletal: Normal range of motion.   Neurological: He is alert and oriented to person, place, and time. He has normal strength. No sensory deficit.   Skin: Skin is warm and dry. No rash noted. No erythema.   Psychiatric: He has a normal mood and affect. His behavior is normal. Judgment  and thought content normal.         ED Course   Procedures  Labs Reviewed   CBC W/ AUTO DIFFERENTIAL   COMPREHENSIVE METABOLIC PANEL   URINALYSIS   CK     Imaging Results          CT Abdomen Pelvis With Contrast (Final result)  Result time 07/21/17 16:30:23    Final result by Aamir López MD (07/21/17 16:30:23)                 Impression:     1.  Further caudal mass effect right diaphragm from pneumothorax, displacing liver caudally, with ptotic right kidney extending midpelvis, renal pelvis projects superiorly and left midlung with hydronephrosis, and UPJ obstruction.    2.  Less distention mid distal small bowel status post colectomy.    3.  Additional remote findings above.          Electronically signed by: RAMONA LÓPEZ MD  Date:     07/21/17  Time:    16:30              Narrative:    The CT scan abdomen pelvis, with Omnipaque 350 75 cc intravenous.    Comparison chest x-ray and CT scan abdomen pelvis dating back 06/04/2017.  Scrotal edema with subcutaneous calcification left scrotum, anasarca, and right pneumothorax with complete collapse right lung stable.    Further mass effect on liver and right kidney by a right pneumothorax with right kidney displaced mid pelvis, and right renal cyst stable.  New severe dilation renal pelvis with hydronephrosis, renal pelvis 7.6 x 4.5 CM and extends (left midline pelvis, right ureter not seen best advantage, compressed, occluded, kinking do to severe ptosis right kidney due to right pneumothorax.    Overall decrease in air fluid distention mid and distal small bowel.      Prostate gland enlargement central calcification, bladder distended mid pelvis.  No new adenopathy.  Small amount of ascites inferior to sandra hepatis extending caudally level of the displaced right kidney.      Contrast excretion right kidney on delayed exam.  Liver, stable.  Spleen absent.  Calcified plaque right coronary, aorta, major branches.  Surgical clips GE junction unchanged.   History colectomy noted with right lower quadrant ileostomy.    Degenerative disc spondylosis facet arthropathy, osteopenia with bony sclerosis L1-L2, L4-5 and L5-S1 end plate with Modic chronic degenerative disc disease change.                              EKG Readings: (Independently Interpreted)   Initial Reading: No STEMI. Previous EKG: Compared with most recent EKG Previous EKG Date: 05/04/17. Rhythm: Normal Sinus Rhythm. Heart Rate: 86. Ectopy: No Ectopy. Clinical Impression: Normal Sinus Rhythm          Medical Decision Making:   Initial Assessment:   82 Y/O WITH GENERALIZED WEAKNESS AND A RECENT HX OF N/V/D 3 DAYS AGO.  ON EXAM, PT IS RESTING IN STRETCHER IN NO DISTRESS.  VITALS ARE STABLE.  ABD IS SOFT AND THERE IS BROWN STOOL NOTED IN ILEOSTOMY BAG.  DRY MM.  NO NAUSEA OR VOMITING IN ED.    Differential Diagnosis:   DDX:  VIRAL ILLNESS, GASTROENTERITIS, METABOLIC DERANGEMENT, ANEMIA, ACS, DEHYDRATION, UTI, COLITIS  Clinical Tests:   Lab Tests: Ordered and Reviewed       <> Summary of Lab: ELEVATED BUN  POTASSIUM OF 5.4  Radiological Study: Ordered and Reviewed  Medical Tests: Ordered and Reviewed  ED Management:  PT HAS BEEN HYDRATED IN ED WITH 1 LITER IV FLUID.  LABS REVEAL ELEVATED BUN.  PT REPORTS THAT HE FEELS MUCH BETTER AFTER IVF.   CT A/P IS POSITIVE FOR CHRONIC RIGHT SIDED PTX.  NO ACUTE ABDOMINAL FINDINGS.  I HAVE DISCUSSED THE FINDINGS WITH THE PATIENT AND HIS FAMILY.  HIS DAUGHTER REPORTS THAT THE PATIENT HAS A CHRONIC RIGHT PTX THAT HAS BEEN EVALUATED BY CT SURGERY AND WAS TOLD THAT THERE WAS NOTHING THAT COULD BE DONE FOR TX SINCE THE CHEST TUBE PLACED ON PREVIOUS ADMISSION WAS INEFFECTIVE.  PT WAS NOT A SURGICAL CANDIDATE. THEY ARE ALSO AWARE OF THE MASS EFFECT THE PTX IS HAVING ON ABD ORGANS.  I HAVE DISCUSSED THE PATIENTS DAUGHTERS NUTRITION CONCERNS AND OFFERED SUGGESTIONS TO HELP THE PATIENT INCREASE HIS PO INTAKE.     THERE HAS BEEN NO ACUTE FINDING ON WORK UP TO INDICATE ADMISSION OR  FURTHER EMERGENT TX.  PT FEELS BETTER AND WILL F/U WITH PCP.      Pt counseled on their diagnosis and the importance of following up with PCP.  Pt also cautioned on when to return to ED.  Pt verbalizes understanding of discharge plan and will return to ED immediately if symptoms worsen                     ED Course     Clinical Impression:   The primary encounter diagnosis was Dehydration. A diagnosis of Weakness was also pertinent to this visit.    Disposition:   Disposition: Discharged  Condition: Stable                        Alejandra Dunn MD  07/25/17 4211

## 2017-07-21 NOTE — ED NOTES
Pt here c/o loose stool,vomiting. Pt has colostomy from history of CDiff. Pt is thin,skin is warm/dry/pink. Pt is alert and oriented with clear speech. Pt follows commands. Instructed on urine collection. Pt denies urinary s/s. No one else in household is sick. Pt denies fevers or cough.

## 2017-12-08 PROBLEM — E87.20 METABOLIC ACIDOSIS: Status: ACTIVE | Noted: 2017-01-01

## 2017-12-08 PROBLEM — E87.1 HYPONATREMIA: Status: ACTIVE | Noted: 2017-01-01

## 2017-12-08 PROBLEM — J93.0 TENSION PNEUMOTHORAX: Status: ACTIVE | Noted: 2017-01-01

## 2017-12-08 PROBLEM — R62.7 FAILURE TO THRIVE IN ADULT: Status: ACTIVE | Noted: 2017-01-01

## 2017-12-08 NOTE — NURSING
1010 To IR on bed with IR crew for placement of Chest tube, pt on O2 and cardiac monitor, AAO x4 in NAD.

## 2017-12-08 NOTE — CONSULTS
Inpatient Radiology Pre-procedure Note    History of Present Illness:  Moises Hernandez is a 82 y.o. male who presents for chest tube placement.  Admission H&P reviewed.  Past Medical History:   Diagnosis Date    C. difficile diarrhea     COPD (chronic obstructive pulmonary disease)      Past Surgical History:   Procedure Laterality Date    ABDOMINAL SURGERY      EYE SURGERY      HERNIA REPAIR      ILEOSTOMY      SPLENECTOMY, TOTAL         Review of Systems:   As documented in primary team H&P    Home Meds:   Prior to Admission medications    Medication Sig Start Date End Date Taking? Authorizing Provider   escitalopram oxalate (LEXAPRO) 10 MG tablet Take 1 tablet (10 mg total) by mouth once daily. 1/26/16 7/21/17  Theodora Arciniega MD   loperamide (IMODIUM) 2 mg capsule Take 2 mg by mouth 4 (four) times daily as needed for Diarrhea.    Historical Provider, MD   pantoprazole (PROTONIX) 20 MG tablet Take 20 mg by mouth once daily.    Historical Provider, MD   PROAIR HFA 90 mcg/actuation inhaler  2/11/16   Historical Provider, MD   simethicone (MYLICON) 80 MG chewable tablet Take 80 mg by mouth every 6 (six) hours as needed for Flatulence.    Historical Provider, MD   trazodone (DESYREL) 50 MG tablet Take 1 tablet (50 mg total) by mouth every evening. 1/26/16 7/21/17  Theodora Arciniega MD     Scheduled Meds:    midazolam        morphine  4 mg Intravenous ED 1 Time    pantoprazole  40 mg Oral Daily    senna-docusate 8.6-50 mg  1 tablet Oral BID     Continuous Infusions:    sodium chloride 0.9% 125 mL/hr at 12/08/17 0644     PRN Meds:acetaminophen, morphine, morphine, ondansetron, simethicone  Anticoagulants/Antiplatelets: no anticoagulation    Allergies: Review of patient's allergies indicates:  No Known Allergies  Sedation Hx: have not been any systemic reactions    Labs:  No results for input(s): INR in the last 168 hours.    Invalid input(s):  PT,  PTT    Recent Labs  Lab 12/08/17  0243   WBC 17.53*   HGB  13.7*   HCT 41.1   MCV 95         Recent Labs  Lab 12/08/17  0243   *   *   K 5.3*      CO2 16*   BUN 28*   CREATININE 1.4   CALCIUM 10.0   ALT 20   AST 24   ALBUMIN 3.7   BILITOT 0.6         Vitals:  Temp: 98.7 °F (37.1 °C) (12/08/17 0730)  Pulse: 80 (12/08/17 1100)  Resp: 17 (12/08/17 1100)  BP: 126/63 (12/08/17 1100)  SpO2: 99 % (12/08/17 1100)     Physical Exam:  ASA: 3  Mallampati: 2    General: no acute distress  Mental Status: alert and oriented to person, place and time  HEENT: normocephalic, atraumatic  Chest: unlabored breathing  Heart: regular heart rate  Abdomen: nondistended  Extremity: moves all extremities    Plan: CT guided chest tube placement  Sedation Plan: Light moderate.    lAen Fonseca M.D.  Diagnostic and Interventional Radiologist  Department of Radiology  Pager: [phone]

## 2017-12-08 NOTE — NURSING
1640 Report called to Ursula SALEEM on 5th floor, pt to be transported on bed, on telemetry to Room #515 in NAD, AAO x4, skin cool/dry/pink.

## 2017-12-08 NOTE — NURSING TRANSFER
Nursing Transfer Note      12/8/2017     Transfer from ICU by ICU nurse and transport via bed with personal belongings.  VSS, 2L 02, NAD.  Chest tube in place.  Will continue to monitor.

## 2017-12-08 NOTE — ED NOTES
Pt. returned to the room. Pt. Placed on cardiac, BP and continuous monitors. Chest tube reconnected to low, continuous wall suction. Bed in the low position, side rails elevated x 2. Pt.s daughter at the bedside.

## 2017-12-08 NOTE — ASSESSMENT & PLAN NOTE
-Admit to ICU  -Chest tube with air leak; will consult IR to upsize catheter size; keep to continuous suction  -Possibly chronic in nature with entrapment of right lung; however, he does currently have an air leak

## 2017-12-08 NOTE — ED NOTES
Pt. Returned to the room from x ray. Returned to cardiac, BP and continuous pulse oximetry monitoring.

## 2017-12-08 NOTE — PROCEDURES
Radiology Post-Procedure Note    Pre Op Diagnosis: Pneumothorax    Post Op Diagnosis: Same    Procedure: CT guided chest tube placement    Procedure performed by: Alen Fonseca MD    Written Informed Consent Obtained: Yes    Specimen Removed: NO    Estimated Blood Loss: Minimal    Findings: Local anesthesia and moderate sedation were used.    A right lateral approach was used to insert a 10-Khmer  all-purpose drainage catheter into the pleural space using CT guidance.  Air was aspirated, however pneumothorax did not improve. This could relate to trapped lung. Pulmonary consultation recommended.     The patient tolerated the procedure well and there were no complications.  Please see Imaging report for further details.    Alen Fonseca M.D.  Diagnostic and Interventional Radiologist  Department of Radiology  Pager: [phone]

## 2017-12-08 NOTE — SUBJECTIVE & OBJECTIVE
No current facility-administered medications on file prior to encounter.      Current Outpatient Prescriptions on File Prior to Encounter   Medication Sig    escitalopram oxalate (LEXAPRO) 10 MG tablet Take 1 tablet (10 mg total) by mouth once daily.    loperamide (IMODIUM) 2 mg capsule Take 2 mg by mouth 4 (four) times daily as needed for Diarrhea.    pantoprazole (PROTONIX) 20 MG tablet Take 20 mg by mouth once daily.    PROAIR HFA 90 mcg/actuation inhaler     simethicone (MYLICON) 80 MG chewable tablet Take 80 mg by mouth every 6 (six) hours as needed for Flatulence.    trazodone (DESYREL) 50 MG tablet Take 1 tablet (50 mg total) by mouth every evening.       Review of patient's allergies indicates:  No Known Allergies    Past Medical History:   Diagnosis Date    C. difficile diarrhea     COPD (chronic obstructive pulmonary disease)      Past Surgical History:   Procedure Laterality Date    ABDOMINAL SURGERY      EYE SURGERY      HERNIA REPAIR      ILEOSTOMY      SPLENECTOMY, TOTAL       Family History     None        Social History Main Topics    Smoking status: Former Smoker    Smokeless tobacco: Never Used    Alcohol use Yes    Drug use: Unknown    Sexual activity: Not on file     Review of Systems   Constitutional: Positive for activity change (long, gradual decline in activity, though still trying to remain active in his garden) and appetite change. Negative for chills and fever.   HENT: Positive for congestion. Negative for trouble swallowing.    Eyes: Negative.    Respiratory: Positive for cough and shortness of breath. Negative for wheezing.    Cardiovascular: Negative for chest pain and palpitations.   Gastrointestinal: Positive for abdominal distention, abdominal pain, constipation, nausea and vomiting. Negative for blood in stool and diarrhea.   Genitourinary: Negative for dysuria and hematuria.   Musculoskeletal: Negative.    Hematological: Does not bruise/bleed easily.      Objective:     Vital Signs (Most Recent):  Temp: 98.5 °F (36.9 °C) (12/08/17 1200)  Pulse: 83 (12/08/17 1230)  Resp: (!) 21 (12/08/17 1230)  BP: 124/71 (12/08/17 1230)  SpO2: 99 % (12/08/17 1230) Vital Signs (24h Range):  Temp:  [97.7 °F (36.5 °C)-98.7 °F (37.1 °C)] 98.5 °F (36.9 °C)  Pulse:  [] 83  Resp:  [16-32] 21  SpO2:  [93 %-100 %] 99 %  BP: ()/(52-98) 124/71     Weight: 41.7 kg (92 lb)  Body mass index is 14.41 kg/m².    Physical Exam    Significant Labs:  CBC:   Recent Labs  Lab 12/08/17  1151   WBC 19.55*   RBC 3.87*   HGB 12.2*   HCT 36.6*      MCV 95   MCH 31.5*   MCHC 33.3     CMP:   Recent Labs  Lab 12/08/17  1156      CALCIUM 8.6*  8.6*   ALBUMIN 3.0*   PROT 6.5   *   K 5.3*   CO2 13*      BUN 23   CREATININE 1.0   ALKPHOS 81   ALT 17   AST 21   BILITOT 0.6       Significant Diagnostics:  chest x-rays and CT reviewed

## 2017-12-08 NOTE — PLAN OF CARE
Problem: Patient Care Overview  Goal: Plan of Care Review  Pt received on nasal cannula at  2 lpm. SPO2   100%.  Pt in no apparent respiratory distress.  Will continue to monitor.

## 2017-12-08 NOTE — ED PROVIDER NOTES
Encounter Date: 12/8/2017    SCRIBE #1 NOTE: I, Jocelyne Blancas, am scribing for, and in the presence of, Dr. Fine.       History     Chief Complaint   Patient presents with    Abdominal Pain     abdominal distention with pain and vomiting x3 days. reports no ileostomy output for last 3 days until pt. arrived in ed. hx of colon resection secondary to c-diff. and has periodic episodes of these symptoms. pt. reports some relief of symptoms since  arrival to ed.      This is a 82 y.o. male who has a past medical history of C. difficile diarrhea and COPD (chronic obstructive pulmonary disease).   The patient presents to the Emergency Department with complaint of abdominal pain and distention as well as a blocked ileostomy/decreased output for 3 days.   He has associated nausea and vomiting all day, many episodes.  The patient has chronic abdominal pain and distension after bowel resection due to C. diff.   The patient report en route to ED, the ileostomy suddenly filled and he is having relief after this.  This has happened a few times over the course of these symptoms.   He has also not vomited since his ileostomy bag filled PTA.  He also complains of productive cough and congestion as well as an episode yesterday of near syncope.  He reports history of recollapsed right pneumothorax that has not been corrected.   He denies fever or chills and is chronically underweight.   Patient's family is trying different meal replacement shakes.  Pt has a past surgical history that includes Hernia repair; Eye surgery; Splenectomy, total; Abdominal surgery; and Ileostomy.      The history is provided by the patient and the spouse.     Review of patient's allergies indicates:  No Known Allergies  Past Medical History:   Diagnosis Date    C. difficile diarrhea     COPD (chronic obstructive pulmonary disease)      Past Surgical History:   Procedure Laterality Date    ABDOMINAL SURGERY      EYE SURGERY      HERNIA REPAIR       ILEOSTOMY      SPLENECTOMY, TOTAL       History reviewed. No pertinent family history.  Social History   Substance Use Topics    Smoking status: Former Smoker    Smokeless tobacco: Never Used    Alcohol use Yes     Review of Systems   Constitutional: Positive for appetite change (chronic) and unexpected weight change (chronic). Negative for chills and fever.   HENT: Negative for facial swelling.    Eyes: Negative for visual disturbance.   Respiratory: Positive for cough. Negative for shortness of breath (no change from baseline).    Cardiovascular: Negative for chest pain.   Gastrointestinal: Positive for abdominal distention, abdominal pain, diarrhea (in output at time of examination), nausea and vomiting.   Genitourinary: Negative for difficulty urinating.   Musculoskeletal: Negative for neck stiffness.   Skin: Negative for rash.   Neurological: Negative for speech difficulty, weakness and numbness.       Physical Exam     Initial Vitals [12/08/17 0212]   BP Pulse Resp Temp SpO2   130/78 92 20 97.7 °F (36.5 °C) 95 %      MAP       95.33         Physical Exam    Nursing note and vitals reviewed.  Constitutional: He is not diaphoretic. No distress.   Chronically ill appearing, frail, cachectic.    HENT:   Head: Normocephalic and atraumatic.   Dry oral mucosa.    Eyes: Conjunctivae are normal. Pupils are equal, round, and reactive to light.   Neck: Normal range of motion. Neck supple.   Cardiovascular: Normal rate, regular rhythm, normal heart sounds and intact distal pulses.   Pulmonary/Chest: No respiratory distress. He has no wheezes. He has no rhonchi. He has no rales.   Hyperresonant breath sounds to the right lung fields.    Abdominal: Bowel sounds are normal.   Scaphoid, midline abdominal scar. Right ostomy with brown stool. Ostomy site clean dry and intact.    Musculoskeletal: Normal range of motion. He exhibits no edema or tenderness.   Neurological: He is alert and oriented to person, place, and time.  "  Skin: Skin is warm and dry.         ED Course   Critical Care  Date/Time: 2017 3:00 AM  Performed by: DEREK LOZA  Authorized by: DEREK LOZA   Total critical care time (exclusive of procedural time) : 45 minutes  Critical care time was exclusive of separately billable procedures and treating other patients.    Chest Tube  Date/Time: 2017 3:45 AM  Performed by: DEREK LOZA  Authorized by: DEREK LOZA   Post-operative diagnosis: Tension pneumothorax  Pre-operative diagnosis: Resolved  Consent Done: Yes  Consent: Written consent obtained.  Risks and benefits: risks, benefits and alternatives were discussed  Consent given by: patient  Patient understanding: patient states understanding of the procedure being performed  Patient consent: the patient's understanding of the procedure matches consent given  Procedure consent: procedure consent matches procedure scheduled  Relevant documents: relevant documents present and verified  Test results: test results available and properly labeled  Site marked: the operative site was marked  Imaging studies: imaging studies available  Required items: required blood products, implants, devices, and special equipment available  Patient identity confirmed: , MRN and name  Time out: Immediately prior to procedure a "time out" was called to verify the correct patient, procedure, equipment, support staff and site/side marked as required.  Indications: tension pneumothorax  Patient sedated: no  Anesthesia: local infiltration    Anesthesia:  Local Anesthetic: lidocaine 1% with epinephrine  Anesthetic total: 2.5 mL  Preparation: skin prepped with Hibeclense and sterile field established  Placement location: right anterior  Scalpel size: 11  Tube size (Tajik): Uresil, 13   Dissection instrument: Martha clamp  Ultrasound guidance: no  Tension pneumothorax heard: yes  Tube connected to: suction  Drainage characteristics: air.  Drainage amount: 300 ml  Suture material: " "no required.  Dressing: 4x4 sterile gauze  Post-insertion x-ray findings: tube in good position  Patient tolerance: Patient tolerated the procedure well with no immediate complications  Complications: No  Estimated blood loss (mL): 0  Specimens: No  Implants: No        Labs Reviewed   CBC W/ AUTO DIFFERENTIAL - Abnormal; Notable for the following:        Result Value    WBC 17.53 (*)     RBC 4.33 (*)     Hemoglobin 13.7 (*)     MCH 31.6 (*)     Gran # 15.7 (*)     Gran% 89.3 (*)     Lymph% 5.8 (*)     All other components within normal limits   COMPREHENSIVE METABOLIC PANEL - Abnormal; Notable for the following:     Sodium 128 (*)     Potassium 5.3 (*)     CO2 16 (*)     Glucose 139 (*)     BUN, Bld 28 (*)     eGFR if  54 (*)     eGFR if non  47 (*)     All other components within normal limits   LIPASE          X-Rays:   Independently Interpreted Readings:   Chest X-Ray: Initial CXR: This is a(n) AP portable chest exam with adequate penetration, positioning and good air entry. Trachea is midline. cardiac and mediastinal silhouettes are deviated to the left secondary to large pneumothorax on the right side which has worsened compared to previous EKG.  There is depression of the right hemidiaphragm, concerning for a tension pneumothorax.   There are no infiltrates, consolidations or effusions. Impression: new tension pneumothorax. This exam was independently interpreted by me.     Repeat CXR after chest tube placement" tube in place, mediastinum mildine, findings no longer consistent with tension pneumothorax. Absence of mediastinal shift and diaphragm flattening. Right lung appears mildly aerated to previous.      Medical Decision Making:   History:   Old Medical Records: I decided to obtain old medical records.  Initial Assessment:   This is an emergent evaluation of a 82 y.o.male patient with presentation of history of vomiting tonight, intolerant of PO with decreased ostomy output, " now suddenly resolved.   Initial differentials include but are not limited to: small bowel obstruction, volvulus, dehydration, ABRAHAM .   Plan: Basic labs, lipase, obstructive series, CXR, IV fluids.    Independently Interpreted Test(s):   I have ordered and independently interpreted X-rays - see prior notes.  Clinical Tests:   Lab Tests: Ordered and Reviewed  Radiological Study: Ordered and Reviewed  Other:   I have discussed this case with another health care provider.       <> Summary of the Discussion: Case discussed with Dr. Arciniega, general surgery, who accepted the patient to her service.  She recommended CT abdomen/pelvis, CT chest.  Orders were placed by me and will be followed up by the admitting team.                   ED Course      Clinical Impression:     1. Tension pneumothorax    2. Cough    3. Status post chest tube placement    4. Vomiting, intractability of vomiting not specified, presence of nausea not specified, unspecified vomiting type    5. S/p total colectomy (Demian)    6. Small bowel obstruction    7. Failure to thrive in adult          Disposition:   Disposition: Placed in Observation       I, Dr. Rob Fine, personally performed the services described in this documentation.   All medical record entries made by the scribe were at my direction and in my presence.   I have reviewed the chart and agree that the record is accurate and complete.   Rob Fine MD.  7:13 PM 12/08/2017                    Rob Fine MD  12/08/17 1916

## 2017-12-08 NOTE — ASSESSMENT & PLAN NOTE
-NPO; IVFs  -Consult to medicine for significant metabolic acidosis, non-anion gap  -Pain and nausea control as needed  -Will monitor ileostomy output which improved just prior to admission

## 2017-12-08 NOTE — CONSULTS
[facility]  General Surgery  Consult Note    Patient Name: Moises Hernandez  MRN: [id number]  Code Status: Full Code  Admission Date: 12/8/2017  Hospital Length of Stay: 0 days  Attending Physician: Theodora Arciniega MD  Primary Care Provider: Rodriguez Castro MD    Patient information was obtained from patient, spouse/SO, past medical records and ER records.     Consults  Subjective:     Principal Problem: <principal problem not specified>    History of Present Illness: Moises Hernandez is a 82 y.o. 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  He also reports decreased appetite over the last couple of days.   He and his wife, who accompanies him, report that he has intermittent episodes like this, but they normally resolve after about a day or so.  This one was not seemingly resolving like normal and he was vomiting more, so they decided to seek evaluation.  On the way to the ED, he started dumping from his ileostomy, and in the ED he had 500 cc output.  While his abdomen felt better, he did still endorse some recent increase in baseline shortness of breath.  On abdominal x-ray, he was shown to have a large, possibly tension, pneumothorax on the right.  A pigtail catheter was placed.  Follow up imaging showed no significant re-expansion of the lung.  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      No current facility-administered medications on file prior to encounter.      Current Outpatient Prescriptions on File Prior to Encounter   Medication Sig    escitalopram oxalate (LEXAPRO) 10 MG  tablet Take 1 tablet (10 mg total) by mouth once daily.    loperamide (IMODIUM) 2 mg capsule Take 2 mg by mouth 4 (four) times daily as needed for Diarrhea.    pantoprazole (PROTONIX) 20 MG tablet Take 20 mg by mouth once daily.    PROAIR HFA 90 mcg/actuation inhaler     simethicone (MYLICON) 80 MG chewable tablet Take 80 mg by mouth every 6 (six) hours as needed for Flatulence.    trazodone (DESYREL) 50 MG tablet Take 1 tablet (50 mg total) by mouth every evening.       Review of patient's allergies indicates:  No Known Allergies    Past Medical History:   Diagnosis Date    C. difficile diarrhea     COPD (chronic obstructive pulmonary disease)      Past Surgical History:   Procedure Laterality Date    ABDOMINAL SURGERY      EYE SURGERY      HERNIA REPAIR      ILEOSTOMY      SPLENECTOMY, TOTAL       Family History     None        Social History Main Topics    Smoking status: Former Smoker    Smokeless tobacco: Never Used    Alcohol use Yes    Drug use: Unknown    Sexual activity: Not on file     Review of Systems   Constitutional: Positive for activity change (long, gradual decline in activity, though still trying to remain active in his garden) and appetite change. Negative for chills and fever.   HENT: Positive for congestion. Negative for trouble swallowing.    Eyes: Negative.    Respiratory: Positive for cough and shortness of breath. Negative for wheezing.    Cardiovascular: Negative for chest pain and palpitations.   Gastrointestinal: Positive for abdominal distention, abdominal pain, constipation, nausea and vomiting. Negative for blood in stool and diarrhea.   Genitourinary: Negative for dysuria and hematuria.   Musculoskeletal: Negative.    Hematological: Does not bruise/bleed easily.     Objective:     Vital Signs (Most Recent):  Temp: 98.5 °F (36.9 °C) (12/08/17 1200)  Pulse: 83 (12/08/17 1230)  Resp: (!) 21 (12/08/17 1230)  BP: 124/71 (12/08/17 1230)  SpO2: 99 % (12/08/17 1230) Vital  Signs (24h Range):  Temp:  [97.7 °F (36.5 °C)-98.7 °F (37.1 °C)] 98.5 °F (36.9 °C)  Pulse:  [] 83  Resp:  [16-32] 21  SpO2:  [93 %-100 %] 99 %  BP: ()/(52-98) 124/71     Weight: 41.7 kg (92 lb)  Body mass index is 14.41 kg/m².    Physical Exam    Significant Labs:  CBC:   Recent Labs  Lab 12/08/17  1151   WBC 19.55*   RBC 3.87*   HGB 12.2*   HCT 36.6*      MCV 95   MCH 31.5*   MCHC 33.3     CMP:   Recent Labs  Lab 12/08/17  1156      CALCIUM 8.6*  8.6*   ALBUMIN 3.0*   PROT 6.5   *   K 5.3*   CO2 13*      BUN 23   CREATININE 1.0   ALKPHOS 81   ALT 17   AST 21   BILITOT 0.6       Significant Diagnostics:  chest x-rays and CT reviewed    Assessment/Plan:     Tension pneumothorax    -Admit to ICU  -Chest tube with air leak; will consult IR to upsize catheter size; keep to continuous suction  -Possibly chronic in nature with entrapment of right lung; however, he does currently have an air leak        Small bowel obstruction    -NPO; IVFs  -Consult to medicine for significant metabolic acidosis, non-anion gap  -Pain and nausea control as needed  -Will monitor ileostomy output which improved just prior to admission          VTE Risk Mitigation         Ordered     Medium Risk of VTE  Once      12/08/17 0812     Place sequential compression device  Until discontinued      12/08/17 0812     Place SERGO hose  Until discontinued      12/08/17 0812          Thank you for your consult. I will follow-up with patient. Please contact us if you have any additional questions.    Ari House Jr., MD  General Surgery  [facility]

## 2017-12-08 NOTE — ED TRIAGE NOTES
Pt. To the ER with c/o nausea and no output from his Ileostomy over the last 3 days. His daughter is at the bedside she reports that her father did have output from the Ileosotmy yesterday only after he walked around and while on the way to the ER. The pt. Is awake, alert and oriented. Pt. Placed on cardiac, BP and continuous pulse oximetry monitoring. Monitor shows NSR with HR- 95, oxygen saturations are 95% on room air. Ileostomy bag drained, 300 ml of stool removed. Pt. States he does have some relief from his pain. Daughter reports that her father had a collapsed right lung from earlier this year. Bed in the low position, side rails elevated x 2.

## 2017-12-08 NOTE — ED NOTES
13 [place] Chest inserted per Dr. Babatunde MD removed 300 ml of air. Dressing secured, clean, dry and intact to right chest wall. No bleeding noted. Chest tube connected to low continuous wall suction, no air leak noted.

## 2017-12-08 NOTE — NURSING
1200 Pt returned from IR on bed, AAO x4, skin cool/dry/pink, denies pain at present, placed on heart monitor, pulse-ox, and auto b/p cuff.

## 2017-12-08 NOTE — NURSING
0707 Pt received from ER on bed, placed on ICU Bed #[id number] on cardiac monitor and pulse-ox and auto B/P cuff, pt AAO x4, skin cool, dry, pink denies pain at present, daughter at bedside, pt in NAD.

## 2017-12-09 NOTE — PROGRESS NOTES
[facility]  General Surgery  Progress Note    Subjective:     History of Present Illness:  Moises Hernandez is a 82 y.o. 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  He also reports decreased appetite over the last couple of days.   He and his wife, who accompanies him, report that he has intermittent episodes like this, but they normally resolve after about a day or so.  This one was not seemingly resolving like normal and he was vomiting more, so they decided to seek evaluation.  On the way to the ED, he started dumping from his ileostomy, and in the ED he had 500 cc output.  While his abdomen felt better, he did still endorse some recent increase in baseline shortness of breath.  On abdominal x-ray, he was shown to have a large, possibly tension, pneumothorax on the right.  A pigtail catheter was placed.  Follow up imaging showed no significant re-expansion of the lung.  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      Post-Op Info:  * No surgery found *         Interval History:   Chronic pneumo  No sob or cp  luz marina diet   bowel function returned  Lytes normalizing    Medications:  Continuous Infusions:   custom IV infusion builder (for pharmacist use only)       Scheduled Meds:   pantoprazole  40 mg Oral Daily     PRN Meds:acetaminophen, morphine, morphine, ondansetron     Review of patient's allergies indicates:  No Known Allergies  Objective:     Vital Signs (Most Recent):  Temp: 98.5 °F (36.9 °C) (12/09/17 0751)  Pulse: 89 (12/09/17 0751)  Resp: 16 (12/09/17 0751)  BP: (!) 105/57  (12/09/17 0751)  SpO2: (!) 94 % (12/09/17 0750) Vital Signs (24h Range):  Temp:  [97.4 °F (36.3 °C)-99.1 °F (37.3 °C)] 98.5 °F (36.9 °C)  Pulse:  [72-93] 89  Resp:  [16-21] 16  SpO2:  [94 %-100 %] 94 %  BP: ()/(52-73) 105/57     Weight: 41.7 kg (92 lb)  Body mass index is 14.41 kg/m².    Intake/Output - Last 3 Shifts       12/07 0700 - 12/08 0659 12/08 0700 - 12/09 0659 12/09 0700 - 12/10 0659    P.O.  60     I.V. (mL/kg)  0 (0)     IV Piggyback 1000      Total Intake(mL/kg) 1000 (24) 60 (1.4)     Urine (mL/kg/hr)  620 (0.6)     Stool 500 600 (0.6) 100 (0.6)    Chest Tube  0 (0)     Total Output 500 1220 100    Net +500 -1160 -100           Urine Occurrence  1 x     Stool Occurrence  1 x           Physical Exam  Grade 1-2 air leak with cough  abd soft nt nd ileo patent    Significant Labs:  CBC:   Recent Labs  Lab 12/09/17  0958   WBC 10.62   RBC 4.11*   HGB 12.9*   HCT 38.0*      MCV 93   MCH 31.4*   MCHC 33.9     BMP:   Recent Labs  Lab 12/08/17  1156 12/09/17  0957    129*   * 133*   K 5.3* 4.3    106   CO2 13* 19*   BUN 23 24*   CREATININE 1.0 0.9   CALCIUM 8.6*  8.6* 9.2   MG 2.1  --        Significant Diagnostics:  I have reviewed and interpreted all pertinent imaging results/findings within the past 24 hours.    Assessment/Plan:     Tension pneumothorax    -Admit to ICU  -Chest tube with air leak; will consult IR to upsize catheter size; keep to continuous suction  -Possibly chronic in nature with entrapment of right lung; however, he does currently have an air leak        Small bowel obstruction    Resolved  Not ready to start antidiarrheals, will see what output is like after full diet        Pneumothorax    Will consult pulm given chronic findings, likely nothing to do but may need to keep tube.             Theodora Arciniega MD  General Surgery  [facility]

## 2017-12-09 NOTE — PLAN OF CARE
Problem: Patient Care Overview  Goal: Plan of Care Review  Outcome: Ongoing (interventions implemented as appropriate)  Pt on room air with SpO2 94 %. No respiratory distress noted. Will continue to monitor.

## 2017-12-09 NOTE — PROGRESS NOTES
"Order to connect chest tube from water seal to wall suction and patient cannot tolerate it, he states, "I cannot breathe and it is sucking me out.  I do not want that shit and it is ok just the way it is."  Disconnected tubing from chest tube and placing patient back to water seal.  Dr. Paul notified of the above.  Daughter at bedside.  Will continue to monitor.  "

## 2017-12-09 NOTE — SUBJECTIVE & OBJECTIVE
Interval History:   Chronic pneumo  No sob or cp  luz marina diet   bowel function returned  Lytes normalizing    Medications:  Continuous Infusions:   custom IV infusion builder (for pharmacist use only)       Scheduled Meds:   pantoprazole  40 mg Oral Daily     PRN Meds:acetaminophen, morphine, morphine, ondansetron     Review of patient's allergies indicates:  No Known Allergies  Objective:     Vital Signs (Most Recent):  Temp: 98.5 °F (36.9 °C) (12/09/17 0751)  Pulse: 89 (12/09/17 0751)  Resp: 16 (12/09/17 0751)  BP: (!) 105/57 (12/09/17 0751)  SpO2: (!) 94 % (12/09/17 0750) Vital Signs (24h Range):  Temp:  [97.4 °F (36.3 °C)-99.1 °F (37.3 °C)] 98.5 °F (36.9 °C)  Pulse:  [72-93] 89  Resp:  [16-21] 16  SpO2:  [94 %-100 %] 94 %  BP: ()/(52-73) 105/57     Weight: 41.7 kg (92 lb)  Body mass index is 14.41 kg/m².    Intake/Output - Last 3 Shifts       12/07 0700 - 12/08 0659 12/08 0700 - 12/09 0659 12/09 0700 - 12/10 0659    P.O.  60     I.V. (mL/kg)  0 (0)     IV Piggyback 1000      Total Intake(mL/kg) 1000 (24) 60 (1.4)     Urine (mL/kg/hr)  620 (0.6)     Stool 500 600 (0.6) 100 (0.6)    Chest Tube  0 (0)     Total Output 500 1220 100    Net +500 -1160 -100           Urine Occurrence  1 x     Stool Occurrence  1 x           Physical Exam  Grade 1-2 air leak with cough  abd soft nt nd ileo patent    Significant Labs:  CBC:   Recent Labs  Lab 12/09/17  0958   WBC 10.62   RBC 4.11*   HGB 12.9*   HCT 38.0*      MCV 93   MCH 31.4*   MCHC 33.9     BMP:   Recent Labs  Lab 12/08/17  1156 12/09/17  0957    129*   * 133*   K 5.3* 4.3    106   CO2 13* 19*   BUN 23 24*   CREATININE 1.0 0.9   CALCIUM 8.6*  8.6* 9.2   MG 2.1  --        Significant Diagnostics:  I have reviewed and interpreted all pertinent imaging results/findings within the past 24 hours.

## 2017-12-09 NOTE — CONSULTS
NEPHROLOGY CONSULT NOTE    HPI & INTERVAL HISTORY:    Past Medical History:   Diagnosis Date    C. difficile diarrhea     COPD (chronic obstructive pulmonary disease)       Past Surgical History:   Procedure Laterality Date    ABDOMINAL SURGERY      EYE SURGERY      HERNIA REPAIR      ILEOSTOMY      SPLENECTOMY, TOTAL        Review of patient's allergies indicates:  No Known Allergies   Prescriptions Prior to Admission   Medication Sig Dispense Refill Last Dose    escitalopram oxalate (LEXAPRO) 10 MG tablet Take 1 tablet (10 mg total) by mouth once daily. 30 tablet 11 4/27/2016 at Unknown time    loperamide (IMODIUM) 2 mg capsule Take 2 mg by mouth 4 (four) times daily as needed for Diarrhea.       pantoprazole (PROTONIX) 20 MG tablet Take 20 mg by mouth once daily.       PROAIR HFA 90 mcg/actuation inhaler    Unknown    simethicone (MYLICON) 80 MG chewable tablet Take 80 mg by mouth every 6 (six) hours as needed for Flatulence.   4/27/2016 at Unknown time    trazodone (DESYREL) 50 MG tablet Take 1 tablet (50 mg total) by mouth every evening. 30 tablet 0 4/27/2016 at Unknown time       Social History     Social History    Marital status:      Spouse name: N/A    Number of children: N/A    Years of education: N/A     Occupational History    Not on file.     Social History Main Topics    Smoking status: Former Smoker    Smokeless tobacco: Never Used    Alcohol use Yes    Drug use: Unknown    Sexual activity: Not on file     Other Topics Concern    Not on file     Social History Narrative    No narrative on file        MEDS   pantoprazole  40 mg Oral Daily               CONTINOUS INFUSIONS:      Intake/Output Summary (Last 24 hours) at 12/09/17 1726  Last data filed at 12/09/17 1024   Gross per 24 hour   Intake                0 ml   Output              620 ml   Net             -620 ml        HEMODYNAMICS:    Temp:  [97.7 °F (36.5 °C)-99.1 °F (37.3 °C)] 98.8 °F (37.1 °C)  Pulse:  [80-93]  86  Resp:  [16-19] 19  SpO2:  [94 %-98 %] 95 %  BP: (104-132)/(56-75) 132/75   Gen: NAD  On exam   No CP, no SOB, no cough, no nausea, no vomiting, no abdominal pain.  Reports weight loss, weakness.  Cards: Pulse 80  Pul: diminished breath sounds  Abdomen soft , ileostomy  Ext: no edema   Skin: dry    LABS   Lab Results   Component Value Date    WBC 10.62 12/09/2017    HGB 12.9 (L) 12/09/2017    HCT 38.0 (L) 12/09/2017    MCV 93 12/09/2017     12/09/2017          Recent Labs  Lab 12/09/17  0957   *   CALCIUM 9.2   *   K 4.3   CO2 19*      BUN 24*   CREATININE 0.9      Lab Results   Component Value Date    CALCIUM 9.2 12/09/2017    PHOS 3.9 12/08/2017      No results found for: IRON, TIBC, FERRITIN     ABG  No results for input(s): PH, PO2, PCO2, HCO3, BE in the last 168 hours.      IMAGING:  CXR    ASSESSMENT / PLAN  Pneumothorax.   CT guided chest tube placement.  ABRAHAM.   Improving.  Azotemia BUN 24.  Creatinine 0.9.   cc/24 h.  Hyponatremia Na 133.  Non Gap Metabolic acidosis.  Anemia HB 12.9.  Poor nutrition .  Albumin 3.  Supplements  /75.  EF 60 % in  2015.  CXR - There is a large right-sided hydropneumothorax similar in size when compared to previous performed one day prior. There is a pigtail catheter within the superior right pleural space.   Avoid hypotension, hypovolemia, nephrotoxic agents.  I and O.  Weight daily.  Na bicarbonate 650 mg - 4 times a day.  D5 1/2 NS + sodium bicarbonate 75 meq at 100 cc/h.  Will follow up.

## 2017-12-09 NOTE — PLAN OF CARE
Problem: Patient Care Overview  Goal: Plan of Care Review  Outcome: Revised  Patient is awake, alert and oriented.  Patient has a chest tube and is to water seal.  Denies shortness of breath.  Patient has an ileostomy and care per self; staff empty ileostomy.  Patient is started on normal saline iv this am.  Daughter is at bedside.  Safety maintained.  Will continue to monitor.

## 2017-12-09 NOTE — CONSULTS
[facility] Medicine Consult Note      Medicine Attending Physician: Yahir  Resident: Abdias  Interns: Jason    Date of Admit: 12/8/2017    Reason for Consult     Medical management of patient with SBO    Subjective:      History of Present Illness:  Moises Hernandez is a 82 y.o. 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 The patient presented on 12/8 stating that for the last couple of days, he has had worsening abdominal pain, nausea, vomiting, and decreased appetite. He associates the symptoms with decreased ileostomy output. En route to the hospital, the patient reports that he began having ostomy output after going several days without any. He states that he continued to have output afterwards and his pain/distension resolved shortly thereafter. On imaging during the work-up, the patient was noted to have a large R sided pneumothorax with near complete collapse of the R lung. On further questioning, the patient states he has noticed issues with breathing for about a year now. He states that he has been told that there was a small pneumo in the past, but he never received any treatment for it.    Past Medical History:  Past Medical History:   Diagnosis Date    C. difficile diarrhea     COPD (chronic obstructive pulmonary disease)        Past Surgical History:  Past Surgical History:   Procedure Laterality Date    ABDOMINAL SURGERY      EYE SURGERY      HERNIA REPAIR      ILEOSTOMY      SPLENECTOMY, TOTAL         Allergies:  Review of patient's allergies indicates:  No Known Allergies    Home Medications:  Prior to Admission medications    Medication Sig Start Date End Date Taking? Authorizing Provider   escitalopram oxalate (LEXAPRO) 10 MG tablet Take 1  "tablet (10 mg total) by mouth once daily. 16  Theodora Arciniega MD   loperamide (IMODIUM) 2 mg capsule Take 2 mg by mouth 4 (four) times daily as needed for Diarrhea.    Historical Provider, MD   pantoprazole (PROTONIX) 20 MG tablet Take 20 mg by mouth once daily.    Historical Provider, MD   PROAIR HFA 90 mcg/actuation inhaler  16   Historical Provider, MD   simethicone (MYLICON) 80 MG chewable tablet Take 80 mg by mouth every 6 (six) hours as needed for Flatulence.    Historical Provider, MD   trazodone (DESYREL) 50 MG tablet Take 1 tablet (50 mg total) by mouth every evening. 16  Theodora Arciniega MD       Family History:  History reviewed. No pertinent family history.    Social History:  Social History   Substance Use Topics    Smoking status: Former Smoker    Smokeless tobacco: Never Used    Alcohol use Yes       Review of Systems:  Pertinent positives and negatives are listed in HPI.  All other systems are reviewed and are negative.     Objective:   Last 24 Hour Vital Signs:  BP  Min: 88/54  Max: 150/61  Temp  Av.5 °F (36.9 °C)  Min: 97.4 °F (36.3 °C)  Max: 99.1 °F (37.3 °C)  Pulse  Av  Min: 72  Max: 107  Resp  Av.4  Min: 16  Max: 32  SpO2  Av.4 %  Min: 93 %  Max: 100 %  Height  Av' 7" (170.2 cm)  Min: 5' 7" (170.2 cm)  Max: 5' 7" (170.2 cm)  Weight  Av.7 kg (92 lb)  Min: 41.7 kg (92 lb)  Max: 41.7 kg (92 lb)  Body mass index is 14.41 kg/m².  I/O last 3 completed shifts:  In: 1060 [P.O.:60; IV Piggyback:1000]  Out: 1300 [Urine:400; Stool:900]    Physical Examination:  General: Cachectic appearing elderly male in no acute distress  Head:  Normocephalic and atraumatic  Eyes:  PERRL; EOMi with anicteric sclera and clear conjunctivae  Mouth:  Oropharynx clear and without exudate; moist mucous membranes  Cardio:  Regular rate and rhythm with normal S1 and S2; no murmurs or rubs  Resp:  Decreased breath sounds on R side, otherwise CTA  Abdom: Soft, " non-tender and non-distensded, ileostomy in place without any surrounding edema or erythema   Extrem: WWP with no clubbing, cyanosis or edema  Skin:  No rashes, lesions, or color changes  Pulses: 2+ and symmetric distally  Neuro:  AAOx3; cooperative and pleasant with no focal deficits    Laboratory:  Most Recent Data:  CBC: Lab Results   Component Value Date    WBC 19.55 (H) 12/08/2017    HGB 12.2 (L) 12/08/2017    HCT 36.6 (L) 12/08/2017     12/08/2017    MCV 95 12/08/2017    RDW 13.0 12/08/2017     BMP: Lab Results   Component Value Date     (L) 12/08/2017    K 5.3 (H) 12/08/2017     12/08/2017    CO2 13 (L) 12/08/2017    BUN 23 12/08/2017    CREATININE 1.0 12/08/2017     12/08/2017    CALCIUM 8.6 (L) 12/08/2017    CALCIUM 8.6 (L) 12/08/2017    MG 2.1 12/08/2017    PHOS 3.9 12/08/2017     LFTs: Lab Results   Component Value Date    PROT 6.5 12/08/2017    ALBUMIN 3.0 (L) 12/08/2017    BILITOT 0.6 12/08/2017    AST 21 12/08/2017    ALKPHOS 81 12/08/2017    ALT 17 12/08/2017     Coags:   Lab Results   Component Value Date    INR 1.1 12/31/2015     Urinalysis: Lab Results   Component Value Date    COLORU Yellow 07/21/2017    SPECGRAV 1.010 07/21/2017    NITRITE Negative 07/21/2017    KETONESU Negative 07/21/2017    UROBILINOGEN Negative 07/21/2017    WBCUA 5 01/01/2016       Trended Lab Data:    Recent Labs  Lab 12/08/17  0243 12/08/17  1151 12/08/17  1156   WBC 17.53* 19.55*  --    HGB 13.7* 12.2*  --    HCT 41.1 36.6*  --     235  --    MCV 95 95  --    RDW 13.0 13.0  --    *  --  131*   K 5.3*  --  5.3*     --  108   CO2 16*  --  13*   BUN 28*  --  23   CREATININE 1.4  --  1.0   *  --  101   PROT 7.7  --  6.5   ALBUMIN 3.7  --  3.0*   BILITOT 0.6  --  0.6   AST 24  --  21   ALKPHOS 92  --  81   ALT 20  --  17       Trended Cardiac Data:  No results for input(s): TROPONINI, CKTOTAL, CKMB, BNP in the last 168 hours.      Radiology:  Imaging Results          CT  Guided Needle Placement (Final result)  Result time 12/08/17 16:47:52    Final result by Alen Fonseca MD (12/08/17 16:47:52)                 Impression:         *  Successful right-sided chest tube as above. Findings concerning for trapped lung which were discussed with Referring physician.      Electronically signed by: ALEN FONSECA MD  Date:     12/08/17  Time:    16:47              Narrative:    Time of Procedure: 12/08/17 10:21:19  Accession # [id number]    Procedure: Chest tube placement    History: Pneumothorax    Radiologist: Marian    Radiation dose: DLP: 641.87 mGy.cm     Findings: Informed consent was obtained prior to the exam after discussion of risk and benefits of the procedure. Moderate sedation was administered during the exam.  Radiology masses present to monitor the patient's vital signs. There is no sedation colon and 30 minutes. CT demonstrated a large right-sided pneumothorax and a chest tube in place. The skin was prepped and draped under sterile conditions and 1% lidocaine was used for local anesthesia.  A one step needle was used to access the right pleural space.  There is return of air. A wire was advanced.  The tract was dilated to 10-Comoran and an 10-Comoran APD drainage catheter was placed and connected to a pleuro-vac. The pneumothorax is not discretely side despite several attempts at aspiration which suggests trapped lung. The patient tolerated the procedure.                             CT Abdomen Pelvis  Without Contrast (Final result)  Result time 12/08/17 06:57:46    Final result by Franki Taylor MD (12/08/17 06:57:46)                 Impression:        1. Large right-sided pneumothorax with near-complete collapse of the right lung and continued downward displacement of the right hemidiaphragm. There is a catheter within the right hemithorax with associated subcutaneous emphysema the right chest wall. There is right upper lobe tethering and surgical clips which may  relate to prior postsurgical repair in this patient with chronic history of right-sided pneumothoraces.    2. Downward displacement of the right kidney secondary to the right-sided pneumothorax with severe dilatation of the collecting system and visualized proximal ureter, similar to prior examination of 7/21/2017. The distal ureter is not well-visualized and etiology of obstruction is uncertain.    3. Postoperative change of prior total colectomy with right lower quadrant ileostomy. Mild diffuse air-fluid filled distention of the small bowel without definite transition point which may represent ileus versus partial small bowel obstruction.    4. Small volume of ascites within the pelvis.    5. Surgical clips at the gastroesophageal junction. Postsurgical change of prior splenectomy.    6. Additional incidental findings as above.      Electronically signed by: FRANCES JAMA  Date:     12/08/17  Time:    06:57              Narrative:    Procedure comments: The patient was surveyed from the thoracic inlet through the pelvis without IV or oral contrast and data was reconstructed for coronal, sagittal, and axial images.    Comparison: CT abdomen and pelvis 5/4/2017.    Findings:    Examination of the vascular and soft tissue structures at the base of the neck is unremarkable.    The thoracic aorta maintains normal caliber, contour, and course with significant atherosclerotic calcification within its course.  The heart is not enlarged and there is no evidence of pericardial effusion. There are prominent coronary artery calcifications present.    There is fluid layering within the esophagus which may relate to underlying esophageal dysmotility.. There are scattered nonenlarged mediastinal lymph nodes.      There is a large right-sided pneumothorax with essentially complete collapse of the right lung. A portion of the right middle lung is aerated. There is right upper lobe pleural tethering with associated surgical clips.  There is a small bore catheter within the right hemithorax.  The left lung demonstrates subpleural emphysematous change. There is left apical scarring. There is no left-sided pneumothorax or pleural effusion. There is a 4-mm pulmonary micronodule within the left upper lobe.     There is downward displacement of the right hemidiaphragm and liver secondary to the large right-sided pneumothorax.  There is a stable appearing low attenuation lesion within the posterior hepatic dome with associated peripheral calcification. The gallbladder is unremarkable.  There is no intra-or extrahepatic biliary ductal dilatation.    There are surgical clips identified at the gastroesophageal junction. The stomach appears within normal limits. The spleen is surgically absent. The pancreas is atrophic. The adrenal glands appear within normal limits.    There is downward displacement of the right kidney. There is severe right-sided hydronephrosis and dilatation of the left proximal ureter, similar to prior study of 7/21/2017. The distal right ureter is not well-visualized. The left kidney demonstrates multiple nonobstructing renal calculi. No definite stones are identified within the left ureter. No definite left-sided hydronephrosis. There are bilateral well-circumscribed renal hypodensities likely representing cysts.  The urinary bladder appears within normal limits. There are several dystrophic prostate calcifications. There is a small volume of free fluid in the pelvis.    The abdominal aorta is normal in course and caliber with extensive atherosclerotic change along its course.    There postsurgical changes of prior total colectomy with right lower quadrant ileostomy.  There is mild diffuse air-fluid distention of the small bowel without definite transition point. No definite free intraperitoneal air or portal venous gas.    The osseous structures demonstrate advanced degenerative changes of the spine, similar to prior examination.   There is subcutaneous emphysema within the right chest wall.                             CT Chest Without Contrast (Final result)  Result time 12/08/17 06:57:46    Final result by Frances Taylor MD (12/08/17 06:57:46)                 Impression:        1. Large right-sided pneumothorax with near-complete collapse of the right lung and continued downward displacement of the right hemidiaphragm. There is a catheter within the right hemithorax with associated subcutaneous emphysema the right chest wall. There is right upper lobe tethering and surgical clips which may relate to prior postsurgical repair in this patient with chronic history of right-sided pneumothoraces.    2. Downward displacement of the right kidney secondary to the right-sided pneumothorax with severe dilatation of the collecting system and visualized proximal ureter, similar to prior examination of 7/21/2017. The distal ureter is not well-visualized and etiology of obstruction is uncertain.    3. Postoperative change of prior total colectomy with right lower quadrant ileostomy. Mild diffuse air-fluid filled distention of the small bowel without definite transition point which may represent ileus versus partial small bowel obstruction.    4. Small volume of ascites within the pelvis.    5. Surgical clips at the gastroesophageal junction. Postsurgical change of prior splenectomy.    6. Additional incidental findings as above.      Electronically signed by: FRANCES TAYLOR  Date:     12/08/17  Time:    06:57              Narrative:    Procedure comments: The patient was surveyed from the thoracic inlet through the pelvis without IV or oral contrast and data was reconstructed for coronal, sagittal, and axial images.    Comparison: CT abdomen and pelvis 5/4/2017.    Findings:    Examination of the vascular and soft tissue structures at the base of the neck is unremarkable.    The thoracic aorta maintains normal caliber, contour, and course with  significant atherosclerotic calcification within its course.  The heart is not enlarged and there is no evidence of pericardial effusion. There are prominent coronary artery calcifications present.    There is fluid layering within the esophagus which may relate to underlying esophageal dysmotility.. There are scattered nonenlarged mediastinal lymph nodes.      There is a large right-sided pneumothorax with essentially complete collapse of the right lung. A portion of the right middle lung is aerated. There is right upper lobe pleural tethering with associated surgical clips. There is a small bore catheter within the right hemithorax.  The left lung demonstrates subpleural emphysematous change. There is left apical scarring. There is no left-sided pneumothorax or pleural effusion. There is a 4-mm pulmonary micronodule within the left upper lobe.     There is downward displacement of the right hemidiaphragm and liver secondary to the large right-sided pneumothorax.  There is a stable appearing low attenuation lesion within the posterior hepatic dome with associated peripheral calcification. The gallbladder is unremarkable.  There is no intra-or extrahepatic biliary ductal dilatation.    There are surgical clips identified at the gastroesophageal junction. The stomach appears within normal limits. The spleen is surgically absent. The pancreas is atrophic. The adrenal glands appear within normal limits.    There is downward displacement of the right kidney. There is severe right-sided hydronephrosis and dilatation of the left proximal ureter, similar to prior study of 7/21/2017. The distal right ureter is not well-visualized. The left kidney demonstrates multiple nonobstructing renal calculi. No definite stones are identified within the left ureter. No definite left-sided hydronephrosis. There are bilateral well-circumscribed renal hypodensities likely representing cysts.  The urinary bladder appears within normal  limits. There are several dystrophic prostate calcifications. There is a small volume of free fluid in the pelvis.    The abdominal aorta is normal in course and caliber with extensive atherosclerotic change along its course.    There postsurgical changes of prior total colectomy with right lower quadrant ileostomy.  There is mild diffuse air-fluid distention of the small bowel without definite transition point. No definite free intraperitoneal air or portal venous gas.    The osseous structures demonstrate advanced degenerative changes of the spine, similar to prior examination.  There is subcutaneous emphysema within the right chest wall.                             X-Ray Chest AP Portable (Final result)  Result time 12/08/17 04:47:04    Final result by Frances Taylor MD (12/08/17 04:47:04)                 Impression:        Interval placement of right-sided thoracostomy catheter with associated large right-sided pneumothorax. There is slight partial reexpansion of the right lung, however prominent flattening of the right hemidiaphragm as well as leftward mediastinal shift appear improved from prior study.      Electronically signed by: FRANCES TAYLOR  Date:     12/08/17  Time:    04:47              Narrative:    Comparison:12/8/2017 0249 hrs.    Technique: Single AP portable chest radiograph.    Findings:   There has been interval placement of a right-sided thoracostomy catheter. There is slight partial reexpansion of the right lung. Additionally, flattening of the right hemidiaphragm and leftward mediastinal shift appear improved from prior examination.    Cardiomediastinal silhouette is unchanged from prior examination. Coarse interstitial markings are noted throughout the left lung without definite pneumothorax or focal consolidation.                             X-Ray Chest AP Portable (Final result)  Result time 12/08/17 03:06:57    Final result by Frances Taylor MD (12/08/17 03:06:57)                  Impression:        Large right-sided pneumothorax with essentially complete collapse of the right lung and leftward mediastinal shift concerning for tension pneumothorax.    Dr. Frances Taylor discussed preliminary findings with DEREK LOZA at 03:05:37 12/08/17      Electronically signed by: FRANCES TAYLOR  Date:     12/08/17  Time:    03:06              Narrative:    Comparison:Chest radiograph 5/5/2017    Technique: Single AP portable chest radiograph.    Findings:     There is a large right-sided pneumothorax with essentially complete collapse of the right lung. There is leftward mediastinal shift.    The cardiomediastinal silhouette appears within normal limits. There is prominent atherosclerotic calcification of the thoracic aorta. The left lung appears relatively well aerated without focal consolidation with stable coarse interstitial markings. There is left apical scarring. No significant left-sided pleural effusion is identified. Skin fold projects over the left hemithorax. No definite left-sided pneumothorax.    Surgical clips project over the gastroesophageal junction. Visualized osseous structures appear intact.                             X-Ray Abdomen Flat And Erect (Final result)  Result time 12/08/17 03:09:43    Final result by Frances Taylor MD (12/08/17 03:09:43)                 Impression:        Apparent partially visualized large right-sided pneumothorax with associated downward displacement of the right hemidiaphragm and leftward midline shift concerning for tension pneumothorax.    Postsurgical change of prior partial bowel resection with right upper quadrant stoma. Mild gaseous distention of the visualized bowel which may relate to ileus or partial obstruction.      Electronically signed by: FRANCES TAYLOR  Date:     12/08/17  Time:    03:09              Narrative:    Comparison:None available    Technique: AP supine and upright abdominal radiographs.    Findings:     There is downward  displacement of the right diaphragm relating to the patient's large right-sided pneumothorax. There is diffuse gaseous distention of the visualized bowel. There apparent postsurgical changes of the colon with right upper quadrant stoma. No definite free intraperitoneal air is identified. Surgical clips are noted at the gastroesophageal junction. The osseous structures demonstrate degenerative changes of the bilateral hips and lumbar spine. There are prominent atherosclerotic calcifications present.                                 Assessment:     Moises Hernandez is a 82 y.o. male with: SBO and large R sided pneumothorax     Plan:   Non-anion gap metabolic acidosis  -Diarrhea most common cause, and in patient with ileostomy, electrolytes losses can be amplified  -Continue to monitor with daily BMPs  -Recommend ABG to better characterize patient acid base disorder and starting fluid resuscitation with 1/2 NS with 1 amp HCO3 @rate of 125 cc/hr    Hyperkalemia  -Recommend EKG to assess for arrthymia and telemetry monitoring while patient is hyperkalemic  -Suspect cause to be related to shift from acidosis, correct with biacarb in fluid as above  -Ordered BMP for this am, if K+ still elevated or rising, treat with kayexalate and shift using insulin 10 units IV with concurrent d50 and/or albuterol nebs     Malnutrition  -Prealbumin 14  -recommend nutrition consult    SBO  -Management per primary team    R sided Pneumothorax  -Chest tube in place, continued management per primary team    Hx of COPD  -No acute issues, recommend albuterol PRN for wheezing      Philippe Calero  [facility] Internal [facility]-I  U Medicine Service    [facility] Medicine Hospitalist Pager numbers:   [facility] Hospitalist Medicine Team A (Yahir/Catrachita): [phone]  [facility] Hospitalist Medicine Team B (Yvette/Karine):  [phone]

## 2017-12-09 NOTE — PLAN OF CARE
Problem: Patient Care Overview  Goal: Plan of Care Review  Outcome: Ongoing (interventions implemented as appropriate)  Patient on oxygen with documented flow OF 1.5 LPM.  Will attempt to wean per O2 order protocol. Will continue to monitor.

## 2017-12-09 NOTE — PLAN OF CARE
Pulmonology:    Patient seen and examined at bedside. Full consult note to follow.    Persistent right pneumothorax since it was incidentally diagnosed in May 2017, but of unknown duration at that point. Yesterday had chest tube placed by ED then Ir, was temporarily to suction, but had persistent air leak without resolution radiographically. Discussed with Dr. Arciniega putting him back on suction overnight to assess his symptoms and if there is any apparent radiographic benefit from having him to wall suction. Likely needs a bronchoscopy for airway evaluation due to concern for endobronchial obstruction. Will discuss with staff tomorrow during rounds.    Sabrina Paul 12/09/2017 3:46 PM  [facility] Fellow

## 2017-12-10 PROBLEM — R05.9 COUGH: Status: ACTIVE | Noted: 2017-01-01

## 2017-12-10 NOTE — PROGRESS NOTES
[facility]     Subjective:      Moises Hernandez is a 82 y.o. male who is being followed by the [facility] service at [facility] for chronic pneumothorax management. 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 Today he feels well. His breathing is improved, especially his ability to take deep breaths. He is motivated to go home.     Objective:   Last 24 Hour Vital Signs:  BP  Min: 82/52  Max: 139/73  Temp  Av.6 °F (37 °C)  Min: 97.5 °F (36.4 °C)  Max: 98.9 °F (37.2 °C)  Pulse  Av.3  Min: 65  Max: 97  Resp  Av.8  Min: 14  Max: 19  SpO2  Av %  Min: 95 %  Max: 97 %  Weight  Av kg (90 lb 6.2 oz)  Min: 41 kg (90 lb 6.2 oz)  Max: 41 kg (90 lb 6.2 oz)  I/O last 3 completed shifts:  In: 2428.3 [P.O.:600; I.V.:1828.3]  Out: 2320 [Urine:1620; Stool:700]    Physical Examination:   General: cachectic elderly man laying in bed in NAD, very conversant  HEENT: temporal wasting, NC/AT, eyes anicteric, EOMI normal on left and fixed on right  Neck: Supple, no JVD   Respiratory: breathing comfortably on RA, no accessory muscle use or supplemental O2  Abdomen: Bowel Sounds very active, ostomy draining, non -distended   Neuro: alert, answering questions appropriately, moving all extremities spontaneously    Laboratory:Laboratory Data Reviewed: yes      Recent Labs  Lab 17  0243 17  1151 17  1156 17  0957 17  0958 12/10/17  0448   WBC 17.53* 19.55*  --   --  10.62  --    HGB 13.7* 12.2*  --   --  12.9*  --    HCT 41.1 36.6*  --   --  38.0*  --     235  --   --  257  --    MCV 95 95  --   --  93  --    RDW 13.0 13.0  --   --  13.0  --    *  --  131* 133*  --  137   K 5.3*  --  5.3* 4.3  --  3.6     --  108 106  --  108   CO2 16*  --  13* 19*  --  23   BUN 28*  --  23 24*  --  17   *  --  101 129*  --  113*   PROT 7.7   --  6.5  --   --   --    ALBUMIN 3.7  --  3.0*  --   --   --    BILITOT 0.6  --  0.6  --   --   --    AST 24  --  21  --   --   --    ALKPHOS 92  --  81  --   --   --    ALT 20  --  17  --   --   --       No results for input(s): TROPONINI, CKTOTAL, CKMB in the last 168 hours.       Current Medications:     Infusions:   sodium chloride 0.9% 75 mL/hr at 12/10/17 1133    sodium bicarbonate drip 100 mL/hr at 12/09/17 8360        Scheduled:   ergocalciferol  50,000 Units Oral Q7 Days    pantoprazole  40 mg Oral Daily    sodium bicarbonate  1 tablet Oral QID        PRN:  acetaminophen, morphine, morphine, ondansetron     Assessment & Plan:       Active Hospital Problems    Diagnosis    *Small bowel obstruction    Cough    Status post chest tube placement    Tension pneumothorax    Hyponatremia    Metabolic acidosis    Failure to thrive in adult    Abdominal pain    Pneumothorax    History of creation of ostomy    Physical deconditioning     Chronic pneumothorax on the right with trapped lung and a bronchopleural or bronchoalveolar fistula. This had lead to intermittent tension pneumothoraces based on review of radiographs, but clinically has never caused him to hemodynamically deteriorate. He has had several chest tubes in the past which have failed to reexpand his lung.  -Will discharge with small bore chest tube in place attached to a 1-way (heimlich) valve to relieve tension in his chest. Likely he will need an ongoing chest tube for the foreseeable future unless his visceral pleura occludes.  -Original chest tube dressing from IR remains in place- should be changed weekly with attention to separate all adhesive from the tubing. He has been instructed on the attachments with the heimlich valve and how to change the dressing- use gauze to try to protect the tubing from attaching to the adhesive (so the tube doesn't get pulled out during dressing changes). The dressings over the tubing can be changed as  necessary.  -If he develops signs of infection, he should present to have the tube removed & for antibiotics. At some point a tunneled catheter can be explored.  -If the tube falls out, ideally a waterproof dressing occlude on 3/4 sides or tegaderm covering gauze open on one side could be placed to function as a one-way valve to prevent him from developing a sucking chest wound  -Likely no benefit in bronchoscopy at this point as he is unlikely to have had a lung cancer occluding his airway this long without significant progression  -Will arrange follow up in Pulmonology clinic at [place] for ongoing management.    Patient seen with Dr. Metcalf and his daughter present at bedside. All questions were answered. Their level of motivation and medical literacy is in his favor, and they would like for him to be discharged home today.      Sabrina Paul  12/10/2017 2:46 PM  [facility] Fellow

## 2017-12-10 NOTE — DISCHARGE INSTRUCTIONS
No soaking bath or swimming until after follow up. Take your pain medication as needed. Take over the counter stool softener while taking pain medication to prevent constipation. If no bowel movement in 2 days take miralax twice a day. Ok for light activity (light housework, walking, stair climbing) no strenuous exercise until after follow up. No lifting greater than 20 pounds or 1 gallon of milk until after follow up. Please call my office if fever > 101.5, pain uncontrolled with oral medications, persistent nausea/vomiting/or diarrhea, redness or drainage from the incisions, or any other worrisome concerns.

## 2017-12-10 NOTE — PLAN OF CARE
Problem: Patient Care Overview  Goal: Plan of Care Review  Plan of care review with patient and family member. Dressing to chest tube is CDI. Pt performing own colostomy care. Family member in room is attentive to patient needs and is assisting in care. Pt stated that he is ready to go home and feels like there is nothing more [facility] can do for him. Pt encouraged to remain compliant with health plan and speak to his physician about desire to be discharged. Pt agreed to continue with plan.

## 2017-12-10 NOTE — CONSULTS
Pulmonary & Critical Care Medicine   Consultation Note    Reason for Consultation: chronic pnx    HPI:   Mr. Hernandez is an 83 y/o gentleman admitted for concern for a tension pneumothorax and SBO yesterday after presenting to the hospital with abdominal distention and low ileostomy output. He has a history of a chronic pneumothorax since at least May 2017, but likely longer, which has been relatively asymptomatic for him. He first developed a spontaneous pneumothorax in Dec 2015 due to COPD, which was resolved with a chest tube. Although a small apical pneumothorax recurred, it was small enough to be likely to self-resolve. In May 2015 he was admitted to [facility] for 1 day, had a chest tube placed to heimlich valve, and when his pneumothorax failed to resolve the tube was removed since he was not a surgical candidate due to malnutrition. He had minimal SOB and a pressure feeling on the right side of the chest that he feels like was relieved when he had the chest tube placed yesterday. His pneumothorax failed to improve radiographicaly with his pleural catheter in place. He is not sure if he has been to continuous suction this admission, but his primary team had intended for him to be to -20cm H2O suction following chest tube placement. During that interval, with no improvement radiographicaly, the chest tube was left to water seal to allow him more mobility. At baseline he is limited by weakness more than dyspnea, but he can only walk about 5-10 minutes before he becomes SOB. He was very active and had no activity limitation prior to his 2015 pneumothorax, but during that admission he developed severe C. diff colitis necessitating colectomy, and since he has suffered from malnutrition and weight loss. He is a former 60-pack-year smoker who quit in 2015 with COPD, but he does not regularly use inhalers at home. He has a chronic cough with clear sputum production but no hemoptysis.    Today after chest tube was placed to  "wall suction, he felt as though he was having his "breath sucked out" and disliked the sensation, but he denied having sharp or substernal pain associated with it.       Past Medical History:   Diagnosis Date    C. difficile diarrhea     COPD (chronic obstructive pulmonary disease)      Past Surgical History:   Procedure Laterality Date    ABDOMINAL SURGERY      EYE SURGERY      HERNIA REPAIR      ILEOSTOMY      SPLENECTOMY, TOTAL       Social History:   Social History     Social History    Marital status:      Spouse name: N/A    Number of children: N/A    Years of education: N/A     Occupational History    Not on file.     Social History Main Topics    Smoking status: Former Smoker     Packs/day: 1.00     Years: 60.00     Types: Cigarettes     Quit date: 2015    Smokeless tobacco: Never Used    Alcohol use Yes    Drug use: Unknown    Sexual activity: Not on file     Other Topics Concern    Not on file     Social History Narrative    No narrative on file     Family History   Problem Relation Age of Onset    Cancer Mother      stomach    Heart attack Father      Drug Allergies:   Review of patient's allergies indicates:  No Known Allergies  Current Infusions:   sodium chloride 0.9% 100 mL/hr at 12/09/17 1243    sodium bicarbonate drip       Scheduled Medications:     pantoprazole  40 mg Oral Daily    [START ON 12/10/2017] sodium bicarbonate  1 tablet Oral QID     PRN Medications:   acetaminophen, morphine, morphine, ondansetron    Review of Systems:   General: weight loss, fatigue, no fevers  Cardio: no CP or edema  Resp: mild SOB, chronic cough  Abd: distention & vomiting intermittently  MSK: muscle wasting  Derm: no rashes    Vital Signs:    Vitals:    12/09/17 1935   BP: 139/73   Pulse: 87   Resp: 17   Temp: 98.8 °F (37.1 °C)       Fluid Balance:     Intake/Output Summary (Last 24 hours) at 12/09/17 1943  Last data filed at 12/09/17 1815   Gross per 24 hour   Intake          1128.33 " ml   Output             1220 ml   Net           -91.67 ml       Physical Exam:   General: cachectic elderly man laying in bed in NAD, cooperative & interactive.  HEENT: temporal wasting, NC/AT, asymmetric eyes, no scleral icterus  Neck: Supple without JVD. No cervical adenopathy.  Cardiac: RRR, weak PT & DP pulses  Respiratory: No tachypnea, no supplemental O2 or accessory muscle use. Decreased musculature of posterior right thorax. Hyperresonant to percussion on the right with decreased breath sounds. CTA on left, no wheezing.  Abdomen: Soft, NT/ND. Ileostomy.  Lymphatic: No palpable supraclavicular or cervical LAD.   Extremities: globally weak, muscle wasting  Skin: Warm and dry without visible rash.   Neuro: alert, answering questions appropriately, moving all extremities    Personal Review and Summary of Prior Diagnostics    Laboratory Studies:       Recent Labs  Lab 12/09/17  0958   WBC 10.62   RBC 4.11*   HGB 12.9*   HCT 38.0*      MCV 93   MCH 31.4*   MCHC 33.9       Recent Labs  Lab 12/09/17  0957   *   K 4.3      CO2 19*   BUN 24*   CREATININE 0.9       Summary of Chest Imaging Personally Reviewed:     CXR: multiple demonstrating a large right pneumothorax. Several recently with a pigtail catheter in place.    CT chest: right pneumothorax, patent RUL bronchus, BI, unclear if occlusion of segments of RML & RLL. Densely opacified region without air bronchograms posterior to the BI concerning for possible mass. Emphysematous changes on the left.    Impression & Recommendations    Chronic right pneumothorax, likely due to spontaneous bleb rupture in the past. Intolerant of CT being placed to suction today; since he does not remember this sensation previously, I am concerned he may have not yet had a trial of negative pressure to reexpand the lung. Also of concern is the possibility of an endobronchial lesion obstructing the lung from reexpanding, although there is nothing occluding the RUL to  prevent it from reexpanding. It is possible that he has had a slowly leaking BPF or alveolar pleural fistula.  -he refused to try having the tube to suction at a lower setting tonight  -had discussed the possibility of bronchoscopy to evaluate for endobronchial obstruction, but it seems unlikely that his lung would reexpand without some negative pressure if an occluded airway was reopened  -Will discuss with Attending, Dr. Metcalf tomorrow. Discussed with Dr. Arciniega this afternoon.    Thank you for the opportunity to participate in the care of this patient. Further recommendations forthcoming.    aSbrina Paul 12/09/2017 8:03 PM  [facility] Fellow

## 2017-12-10 NOTE — PROGRESS NOTES
[place] faxed HH orders to [facility] weekend on call, [place], [phone] ([phone]), fax [phone], with pulmonary note, awaiting return call with  agency information

## 2017-12-10 NOTE — SUBJECTIVE & OBJECTIVE
Interval History:  Feeling well  Hasn't breathed this well in over a month    Medications:  Continuous Infusions:   sodium chloride 0.9% 75 mL/hr at 12/10/17 1133    sodium bicarbonate drip 100 mL/hr at 12/09/17 5909     Scheduled Meds:   ergocalciferol  50,000 Units Oral Q7 Days    pantoprazole  40 mg Oral Daily    sodium bicarbonate  1 tablet Oral QID     PRN Meds:acetaminophen, morphine, morphine, ondansetron     Review of patient's allergies indicates:  No Known Allergies  Objective:     Vital Signs (Most Recent):  Temp: 98.9 °F (37.2 °C) (12/10/17 1201)  Pulse: 65 (12/10/17 1201)  Resp: 14 (12/10/17 1201)  BP: (!) 97/53 (12/10/17 1201)  SpO2: 96 % (12/10/17 0929) Vital Signs (24h Range):  Temp:  [97.5 °F (36.4 °C)-98.9 °F (37.2 °C)] 98.9 °F (37.2 °C)  Pulse:  [65-97] 65  Resp:  [14-19] 14  SpO2:  [95 %-97 %] 96 %  BP: ()/(52-75) 97/53     Weight: 41 kg (90 lb 6.2 oz)  Body mass index is 14.16 kg/m².    Intake/Output - Last 3 Shifts       12/08 0700 - 12/09 0659 12/09 0700 - 12/10 0659 12/10 0700 - 12/11 0659    P.O. 60 600     I.V. (mL/kg) 0 (0) 1828.3 (44.6)     IV Piggyback       Total Intake(mL/kg) 60 (1.4) 2428.3 (59.2)     Urine (mL/kg/hr) 620 (0.6) 1400 (1.4) 320 (1.5)    Stool 600 (0.6) 500 (0.5)     Chest Tube 0 (0)      Total Output 1220 1900 320    Net -1160 +528.3 -320           Urine Occurrence 1 x      Stool Occurrence 1 x            Physical Exam  Air leak 1-2  Site c/d/i    Significant Labs:  CBC:   Recent Labs  Lab 12/09/17  0958   WBC 10.62   RBC 4.11*   HGB 12.9*   HCT 38.0*      MCV 93   MCH 31.4*   MCHC 33.9     CMP:   Recent Labs  Lab 12/08/17  1156  12/10/17  0448     < > 113*   CALCIUM 8.6*  8.6*  < > 8.3*   ALBUMIN 3.0*  --   --    PROT 6.5  --   --    *  < > 137   K 5.3*  < > 3.6   CO2 13*  < > 23     < > 108   BUN 23  < > 17   CREATININE 1.0  < > 0.7   ALKPHOS 81  --   --    ALT 17  --   --    AST 21  --   --    BILITOT 0.6  --   --    < > = values  in this interval not displayed.    Significant Diagnostics:  I have reviewed and interpreted all pertinent imaging results/findings within the past 24 hours.

## 2017-12-10 NOTE — PLAN OF CARE
History of Present Illness:  Moises Hernandez is a 82 y.o. 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  He also reports decreased appetite over the last couple of days.   He and his wife, who accompanies him, report that he has intermittent episodes like this, but they normally resolve after about a day or so.  This one was not seemingly resolving like normal and he was vomiting more, so they decided to seek evaluation.  On the way to the ED, he started dumping from his ileostomy, and in the ED he had 500 cc output.  While his abdomen felt better, he did still endorse some recent increase in baseline shortness of breath.  On abdominal x-ray, he was shown to have a large, possibly tension, pneumothorax on the right.  A pigtail catheter was placed.  Follow up imaging showed no significant re-expansion of the lung.  He and his wife report that he has had a chronically collapsed lung on that side, for which he underwent evaluation by Thoracic Surgery at [facility] and deemed not a surgical candidate, especially since he was able to oxygenate well on room air, despite his nearly completely collapsed right lung.    Pt independent with ADLs, no HH, has straight cane, shower chair, grab bars in tub, ostomy supplies. Lives with susan Sanchez normally (house being re done). Currently all living with son Preston, his wife and 2 children. Daughter Laura to provide transportation home on discharge       12/10/17 1302   Discharge Assessment   Assessment Type Discharge Planning Assessment   Confirmed/corrected address and phone number on facesheet? Yes   Assessment information obtained from? Patient   Expected Length of Stay (days) 2   Communicated expected length of stay with patient/caregiver yes   Prior to hospitilization cognitive status: Alert/Oriented   Prior to  hospitalization functional status: Independent   Current cognitive status: Alert/Oriented   Current Functional Status: Independent   Facility Arrived From: (home)   Lives With child(inna), adult  (daughter: Laura Yuen [phone], also son, daughter-in-law and two grand children)   Able to Return to Prior Arrangements yes   Is patient able to care for self after discharge? Yes   Who are your caregiver(s) and their phone number(s)? daughter: Laura Yuen [phone]   Patient's perception of discharge disposition home or selfcare   Readmission Within The Last 30 Days no previous admission in last 30 days   Patient currently being followed by outpatient case management? No   Patient currently receives any other outside agency services? No   Equipment Currently Used at Home cane, straight;colostomy/ostomy supplies;shower chair;grab bar   Do you have any problems affording any of your prescribed medications? No   Is the patient taking medications as prescribed? yes   Does the patient have transportation home? Yes   Transportation Available family or friend will provide   Dialysis Name and Scheduled days N/A   Does the patient receive services at the Coumadin Clinic? No   Discharge Plan A Home Health   Discharge Plan B Home with family   Patient/Family In Agreement With Plan yes     Samantha Peterson RN Transitional Navigator  [phone]

## 2017-12-10 NOTE — PROGRESS NOTES
[place] faxed HH orders to [facility] [phone] ([phone]), fax [phone], awaiting return call

## 2017-12-10 NOTE — PROGRESS NOTES
[facility] Resident CHIVO Progress Note    Subjective:      Moises Hernandez is a 82 y.o. AA male who is being followed by the [facility] IM service at [facility] for consult for NAGMA.     Pt reports feeling completely at his baseline. His ostomy has returned to function, he is tolerating a full diet. Per patient his lung has not re-expanded but he does not feel SOB.      Objective:   Last 24 Hour Vital Signs:  BP  Min: 82/52  Max: 139/73  Temp  Av.4 °F (36.9 °C)  Min: 97.5 °F (36.4 °C)  Max: 98.8 °F (37.1 °C)  Pulse  Av.1  Min: 84  Max: 97  Resp  Av.5  Min: 16  Max: 19  SpO2  Av.4 %  Min: 94 %  Max: 97 %  Weight  Av kg (90 lb 6.2 oz)  Min: 41 kg (90 lb 6.2 oz)  Max: 41 kg (90 lb 6.2 oz)  I/O last 3 completed shifts:  In: 2428.3 [P.O.:600; I.V.:1828.3]  Out: 2320 [Urine:1620; Stool:700]    Physical Examination:  General:          Cachectic appearing elderly male in no acute distress  Head:               Normocephalic and atraumatic  Eyes:               PERRL; EOMi with anicteric sclera and clear conjunctivae  Mouth:             Oropharynx clear and without exudate; moist mucous membranes  Cardio:             Regular rate and rhythm with normal S1 and S2; no murmurs or rubs  Resp:               Decreased breath sounds on R side, otherwise CTA  Abdom:            Soft, non-tender and non-distensded, ileostomy in place without any surrounding edema or erythema   Extrem:            WWP with no clubbing, cyanosis or edema  Skin:                No rashes, lesions, or color changes  Pulses:            2+ and symmetric distally  Neuro:             AAOx3; cooperative and pleasant with no focal deficits    Laboratory:  Laboratory Data Reviewed: yes  Pertinent Findings:  HCO3 - 23    Prealb - 14    K/Mg low end of normal      Current Medications:     Infusions:   sodium chloride 0.9% 100 mL/hr at 17 1243    sodium bicarbonate drip 100 mL/hr at 17 5468         Scheduled:   pantoprazole  40 mg Oral Daily    sodium bicarbonate  1 tablet Oral QID        PRN:  acetaminophen, morphine, morphine, ondansetron    Assessment:     Moises Hernandez is a 82 y.o.male with  Patient Active Problem List    Diagnosis Date Noted    Cough     Status post chest tube placement     Tension pneumothorax 12/08/2017    Hyponatremia 12/08/2017    Metabolic acidosis 12/08/2017    Failure to thrive in adult 12/08/2017    Small bowel obstruction 05/05/2017    Abdominal pain 05/04/2017    Pneumothorax 05/04/2017    Early satiety 04/28/2016    Decreased appetite 04/20/2016    Abdominal bloating 04/20/2016    Nausea 04/20/2016    Attention to ileostomy 02/03/2016    History of creation of ostomy 01/19/2016    S/p total colectomy (Arciniega) 01/19/2016    Tobacco abuse 12/31/2015    Physical deconditioning 12/29/2015    COPD (chronic obstructive pulmonary disease) with acute bronchitis         Plan:     1. Tension Pneumothorax   - Chest tube in place.  General surgery primary with pulmonary consultation noted.      2. Metabolic Non-Anion Gap Acidosis   - Etiology likely related to volume depletion, post-ostomy state, and diarrhea.   - Acid-base status improved.    - HCO3 improved to 23 with Renal recommendations. Reeived multiple avenues of Bicarb. Output has improved.     3. Hyponatremia - Hypovolemic.    - Check urine osmolarity - 634  - Received some IVF but stopped by Renal.   - Na at 137 today , 133 on admit    4. Moderate Malnutrition   - Consult nutrition.  Monitor prealbumin level - 14    5. Anemia   - HGB stable.  No signs of bleeding.  - Iron panel pending    6.  COPD - Continue albuterol MDI.  Recommend repeat outpatient PFT's.    Dispo - will discuss signoff with staff as issues have largely resolved.     Elio De Oliveira  [facility] Internal Medicine HO-I  [facility] IM Service Team A    [facility] Medicine Hospitalist Pager numbers:   [facility] Hospitalist Medicine Team A  (Yahir/Catrachita): [phone]  [facility] Hospitalist Medicine Team B (Yvette/Karine):  [phone]

## 2017-12-10 NOTE — PROGRESS NOTES
Patient is being discharged home per MD.  Discharge instructions on medications, signs and symptoms when to call the MD, follow-up visit and chest tube site care in case chest tube comes out given to patient and patient's daughter.  Both verbalized complete understanding on the above discharge instructions.  Daughter is at bedside and will accompany patient home.  Will call for wheelchair per Transport when patient is ready to leave.

## 2017-12-10 NOTE — PROGRESS NOTES
[facility]  General Surgery  Progress Note    Subjective:     History of Present Illness:  Moises Hernandez is a 82 y.o. 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  He also reports decreased appetite over the last couple of days.   He and his wife, who accompanies him, report that he has intermittent episodes like this, but they normally resolve after about a day or so.  This one was not seemingly resolving like normal and he was vomiting more, so they decided to seek evaluation.  On the way to the ED, he started dumping from his ileostomy, and in the ED he had 500 cc output.  While his abdomen felt better, he did still endorse some recent increase in baseline shortness of breath.  On abdominal x-ray, he was shown to have a large, possibly tension, pneumothorax on the right.  A pigtail catheter was placed.  Follow up imaging showed no significant re-expansion of the lung.  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      Post-Op Info:  * No surgery found *         Interval History:  Feeling well  Hasn't breathed this well in over a month    Medications:  Continuous Infusions:   sodium chloride 0.9% 75 mL/hr at 12/10/17 1133    sodium bicarbonate drip 100 mL/hr at 12/09/17 1260     Scheduled Meds:   ergocalciferol  50,000 Units Oral Q7 Days    pantoprazole  40 mg Oral Daily    sodium bicarbonate  1 tablet Oral QID     PRN Meds:acetaminophen, morphine, morphine, ondansetron     Review of patient's allergies indicates:  No Known Allergies  Objective:     Vital Signs (Most Recent):  Temp: 98.9  °F (37.2 °C) (12/10/17 1201)  Pulse: 65 (12/10/17 1201)  Resp: 14 (12/10/17 1201)  BP: (!) 97/53 (12/10/17 1201)  SpO2: 96 % (12/10/17 0929) Vital Signs (24h Range):  Temp:  [97.5 °F (36.4 °C)-98.9 °F (37.2 °C)] 98.9 °F (37.2 °C)  Pulse:  [65-97] 65  Resp:  [14-19] 14  SpO2:  [95 %-97 %] 96 %  BP: ()/(52-75) 97/53     Weight: 41 kg (90 lb 6.2 oz)  Body mass index is 14.16 kg/m².    Intake/Output - Last 3 Shifts       12/08 0700 - 12/09 0659 12/09 0700 - 12/10 0659 12/10 0700 - 12/11 0659    P.O. 60 600     I.V. (mL/kg) 0 (0) 1828.3 (44.6)     IV Piggyback       Total Intake(mL/kg) 60 (1.4) 2428.3 (59.2)     Urine (mL/kg/hr) 620 (0.6) 1400 (1.4) 320 (1.5)    Stool 600 (0.6) 500 (0.5)     Chest Tube 0 (0)      Total Output 1220 1900 320    Net -1160 +528.3 -320           Urine Occurrence 1 x      Stool Occurrence 1 x            Physical Exam  Air leak 1-2  Site c/d/i    Significant Labs:  CBC:   Recent Labs  Lab 12/09/17  0958   WBC 10.62   RBC 4.11*   HGB 12.9*   HCT 38.0*      MCV 93   MCH 31.4*   MCHC 33.9     CMP:   Recent Labs  Lab 12/08/17  1156  12/10/17  0448     < > 113*   CALCIUM 8.6*  8.6*  < > 8.3*   ALBUMIN 3.0*  --   --    PROT 6.5  --   --    *  < > 137   K 5.3*  < > 3.6   CO2 13*  < > 23     < > 108   BUN 23  < > 17   CREATININE 1.0  < > 0.7   ALKPHOS 81  --   --    ALT 17  --   --    AST 21  --   --    BILITOT 0.6  --   --    < > = values in this interval not displayed.    Significant Diagnostics:  I have reviewed and interpreted all pertinent imaging results/findings within the past 24 hours.    Assessment/Plan:     * Small bowel obstruction    Resolved           Tension pneumothorax    -Admit to ICU  -Chest tube with air leak; will consult IR to upsize catheter size; keep to continuous suction  -Possibly chronic in nature with entrapment of right lung; however, he does currently have an air leak        Pneumothorax    Keep tube with heimlich valve.   F/up ct surg and  leah Arciniega MD  General Surgery  [facility]

## 2017-12-11 PROBLEM — K56.609 SBO (SMALL BOWEL OBSTRUCTION): Status: ACTIVE | Noted: 2017-01-01

## 2017-12-11 NOTE — PLAN OF CARE
[place] fax[place] HH orders to [facility], [facility], [phone] ([phone]), fax [phone], with pulmonary note, [facility]ali set up HH with [facility].    Per Dr. Sabrina Paul with Pulmonology, she will schedule appt for pt with a pulmonary MD for next week    No DME ordered    Pt's nurse will go over medications/signs and symptoms prior to discharge       12/10/17 5831   Final Note   Assessment Type Final Discharge Note   Discharge Disposition Home-Health  ([facility] )   What phone number can be called within the next 1-3 days to see how you are doing after discharge? [phone]   Hospital Follow Up  Appt(s) scheduled? No  (Offices closed, [place] to follow up)   Right Care Referral Info   Post Acute Recommendation Home-care  ([facility] )     Samantha Peterson, RN Transitional Navigator  [phone]

## 2017-12-11 NOTE — PROCEDURES
"Moises Hernandez is a 82 y.o. male patient.    Temp: 97.2 °F (36.2 °C) (12/11/17 1248)  Pulse: (!) 113 (12/11/17 1248)  Resp: 18 (12/11/17 1248)  BP: (!) 152/80 (12/11/17 1248)  SpO2: 97 % (12/11/17 0610)  Weight: 41.3 kg (91 lb) (12/11/17 0343)  Height: 5' 7" (170.2 cm) (12/11/17 0343)    PICC  Date/Time: 12/11/2017 2:11 PM  Performed by: PRESTON PEREZ  Consent Done: Yes  Time out: Immediately prior to procedure a time out was called to verify the correct patient, procedure, equipment, support staff and site/side marked as required  Indications: med administration and vascular access  Anesthesia: local infiltration  Local anesthetic: lidocaine 1% without epinephrine  Anesthetic Total (mL): 5  Description of findings: picc  Preparation: skin prepped with chlorhexidine (without alcohol)  Skin prep agent dried: skin prep agent completely dried prior to procedure  Sterile barriers: all five maximum sterile barriers used - cap, mask, sterile gown, sterile gloves, and large sterile sheet  Hand hygiene: hand hygiene performed prior to central venous catheter insertion  Location details: left basilic  Catheter type: double lumen  Catheter size: 5 Fr  Catheter Length: 40cm    Ultrasound guidance: yes  Vessel Caliber: medium and patent, compressibility normal  Vascular Doppler: not done  Needle advanced into vessel with real time Ultrasound guidance.  Guidewire confirmed in vessel.  Image recorded and saved.  Sterile sheath used.  no esophageal manometryNumber of attempts: 1  Post-procedure: blood return through all ports, chlorhexidine patch and sterile dressing applied  Estimated blood loss (mL): 0  Specimens: No  Implants: No  Assessment: placement verified by x-ray, tip termination and successful placement  Complications: none        Preston Perez  12/11/2017  "

## 2017-12-11 NOTE — ASSESSMENT & PLAN NOTE
sbo without transition point, partial given ongoing flatus however will trial ng decompression      Checking ua ucx    FTT in adult- support with TPN    Dyspepsia, IV ppi    Chronic pneumothorax-- ct to seal/heimlich valve

## 2017-12-11 NOTE — ED PROVIDER NOTES
Encounter Date: 12/11/2017    SCRIBE #1 NOTE: I, Juliaissac Cervantes, am scribing for, and in the presence of, Dr. Terrazas.       History     Chief Complaint   Patient presents with    Abdominal Pain     worsening abdominal pain and vomiting since discharge [facility] at 4pm. pt. has hx. of ileostomy s/p colon resection. + abdominal distention. adomen is firm. pt. has no output in ileostomy bag. daughter reports only approx. 120cc since 4pm.      This is a 82 y.o. male with PMHx of difficile diarrhea and COPD and PSHx of hernia repair, ileostomy, and total splenectomy. The patient presents with complaint of worsening sharp abdominal pain and vomiting since ~ 1600 yesterday. The pain is constant, worse with movement and emesis, no alleviating factors reported. Of note, this was a few hours after discharge. The patient associates nausea.The patient's daughter explains he has had decreased output to ileostomy bag.       The history is provided by a relative and the patient.     Review of patient's allergies indicates:  No Known Allergies  Past Medical History:   Diagnosis Date    C. difficile diarrhea     COPD (chronic obstructive pulmonary disease)      Past Surgical History:   Procedure Laterality Date    ABDOMINAL SURGERY      EYE SURGERY      HERNIA REPAIR      ILEOSTOMY      SPLENECTOMY, TOTAL       Family History   Problem Relation Age of Onset    Cancer Mother      stomach    Heart attack Father      Social History   Substance Use Topics    Smoking status: Former Smoker     Packs/day: 1.00     Years: 60.00     Types: Cigarettes     Quit date: 2015    Smokeless tobacco: Never Used    Alcohol use Yes     Review of Systems   Constitutional: Negative for chills, fatigue and fever.   HENT: Negative for congestion, sore throat and voice change.    Eyes: Negative for photophobia, pain and redness.   Respiratory: Negative for cough, choking and shortness of breath.    Cardiovascular: Negative for chest pain,  palpitations and leg swelling.   Gastrointestinal: Positive for abdominal pain, nausea and vomiting. Negative for diarrhea.   Genitourinary: Negative for dysuria, frequency and urgency.   Musculoskeletal: Negative for back pain, neck pain and neck stiffness.   Skin: Negative for rash.   Neurological: Negative for seizures, speech difficulty, light-headedness, numbness and headaches.   All other systems reviewed and are negative.      Physical Exam     Initial Vitals   BP Pulse Resp Temp SpO2   -- -- -- -- --      MAP       --         Physical Exam    Nursing note and vitals reviewed.  Constitutional: He appears well-developed. He appears cachectic. He appears ill. He appears distressed (moderate).   Somewhat cachectic and frail appearing   HENT:   Head: Normocephalic and atraumatic.   Dry MM; Oropharynx clear   Eyes: Conjunctivae and EOM are normal. Pupils are equal, round, and reactive to light.   Neck: Normal range of motion. Neck supple. No tracheal deviation present.   Cardiovascular: Normal rate, regular rhythm, normal heart sounds and intact distal pulses. Exam reveals no gallop and no friction rub.    No murmur heard.  Pulmonary/Chest: Decreased breath sounds: right sided. He has no wheezes. He has no rhonchi. He has no rales.   Decreased breath sounds on right, coarse breath sounds on left    Previous heimlich valve placement wound to right chest w/o drainage, erythema, or induration.   Abdominal: He exhibits distension. There is tenderness.   Abdomen hard to palpation; Ileostomy noted to R abd, pink, no ostomy output, no surrounding erythema, induration, fluctuance   Musculoskeletal: Normal range of motion. He exhibits no tenderness.   Neurological: He is alert and oriented to person, place, and time. He has normal strength. No cranial nerve deficit or sensory deficit.   Skin: Skin is warm and dry. Capillary refill takes less than 2 seconds. No rash noted. No erythema.   Psychiatric: He has a normal mood  and affect.         ED Course   Critical Care  Date/Time: 12/11/2017 4:00 AM  Performed by: ROZ WARD  Authorized by: ROZ WARD   Direct patient critical care time: 15 minutes  Additional history critical care time: 15 minutes  Ordering / reviewing critical care time: 15 minutes  Documentation critical care time: 15 minutes  Consulting other physicians critical care time: 15 minutes  Consult with family critical care time: 10 minutes  Total critical care time (exclusive of procedural time) : 85 minutes  Critical care time was exclusive of separately billable procedures and treating other patients.  Critical care was time spent personally by me on the following activities: discussions with consultants, ordering and review of laboratory studies, ordering and review of radiographic studies, ordering and performing treatments and interventions, obtaining history from patient or surrogate, examination of patient, evaluation of patient's response to treatment, interpretation of cardiac output measurements, pulse oximetry, re-evaluation of patient's condition and review of old charts.        Labs Reviewed   CBC W/ AUTO DIFFERENTIAL - Abnormal; Notable for the following:        Result Value    WBC 22.21 (*)     RBC 4.26 (*)     Hemoglobin 13.5 (*)     Hematocrit 39.8 (*)     MCH 31.7 (*)     Gran # 19.0 (*)     Mono # 1.5 (*)     Gran% 85.5 (*)     Lymph% 7.2 (*)     All other components within normal limits   COMPREHENSIVE METABOLIC PANEL - Abnormal; Notable for the following:     CO2 20 (*)     Glucose 184 (*)     AST 47 (*)     Anion Gap 17 (*)     All other components within normal limits   PROTIME-INR   TROPONIN I   URINALYSIS   B-TYPE NATRIURETIC PEPTIDE     EKG Readings: (Independently Interpreted)   Initial Reading: No STEMI. Previous EKG: Compared with most recent EKG Previous EKG Date: 06/21/17. Rhythm: Normal Sinus Rhythm. Heart Rate: 94. Ectopy: No Ectopy. ST Segments: Normal ST Segments. T  Waves: Normal. Axis: Right Axis Deviation.   No ST or T wave changes. Minimal change from prior.        X-Rays:   Independently Interpreted Readings:   Other Readings:  I have visualized all imaging for this patient, radiology has done the interpretation.      CT Abdomen Pelvis With Contrast   Final Result         Large right-sided pneumothorax with collapse of the right lung, unchanged.      Interval development of diffusely distended fluid-filled small bowel with air-fluid levels consistent with distal small bowel obstruction.  The colon is surgically absent.  Transition point is not identified.  There is a right sided ileostomy and further assessment for etiology of obstruction could be performed with contrast enema through the ileostomy, as clinically indicated.      Interval resolution of right renal displacement and right-sided hydronephrosis.         Electronically signed by: BRIDGETT PIERRE MD   Date:     12/11/17   Time:    04:39       X-Ray Chest AP Portable   Final Result         Large right hydropneumothorax with pigtail catheter in the right hemithorax appear unchanged from prior study.  The            Electronically signed by: BRIDGETT PIERRE MD   Date:     12/11/17   Time:    04:09           Medical Decision Making:   History:   Old Medical Records: I decided to obtain old medical records.  Initial Assessment:   81 y/o M presents to the ED c/o Abd pain, N/V  Differential Diagnosis:   SBO, pancreatitis, diverticulitis, cholecystitis, hernia, gastritis, gastroenteritis, constipation, colitis,   Independently Interpreted Test(s):   I have ordered and independently interpreted X-rays - see prior notes.  Clinical Tests:   Lab Tests: Reviewed       <> Summary of Lab: Leukocytosis  ED Management:  D/W Dr. Arciniega, who requests digital manipulation of stoma to break up any hard obstructions at the ostomy site. This was performed with no output. Dr. Arciniega agrees with admission at this time.   Other:   I have  discussed this case with another health care provider.                   ED Course      Clinical Impression:   There were no encounter diagnoses.  1. SBO (small bowel obstruction)    2. Abdominal pain, unspecified abdominal location        Disposition:   Disposition: Placed in Observation  Condition: Stable       I, Dr. Wilman Terrazas, personally performed the services described in this documentation. All medical record entries made by the scribe were at my direction and in my presence.  I have reviewed the chart and agree that the record reflects my personal performance and is accurate and complete. Wilman Terrazas MD.  4:58 AM 12/11/2017                       Wilman Terrazas MD  12/11/17 0504

## 2017-12-11 NOTE — DISCHARGE SUMMARY
[facility]  Discharge Note  Short Stay    Admit Date: 12/8/2017    Discharge Date and Time: 12/10/2017  4:52 PM     Attending Physician: No att. providers found     Discharge Provider: Theodora Arciniega    Diagnoses:  Active Hospital Problems    Diagnosis  POA    *Small bowel obstruction [K56.609]  Yes    Cough [R05]  Yes    Status post chest tube placement [Z93.8]  Not Applicable    Tension pneumothorax [J93.0]  Yes    Hyponatremia [E87.1]  Yes    Metabolic acidosis [E87.2]  Yes    Failure to thrive in adult [R62.7]  Yes    Abdominal pain [R10.9]  Yes    Pneumothorax [J93.9]  Yes    History of creation of ostomy [Z87.898]  Not Applicable    Physical deconditioning [R53.81]  Yes      Resolved Hospital Problems    Diagnosis Date Resolved POA   No resolved problems to display.       Discharged Condition: fair    Hospital Course: Patient was admitted with small bowel obstruction and worsening of chronic right pneumothorax.  Upon admission to the hospital his obstruction resolved with immediate output from his ileostomy.  A tube that was placed by the ER physician was replaced by interventional radiology under image guidance.  This resulted in mild improvement of his pneumothorax and clinical improvement based on his symptoms.  He was evaluated by pulmonology and felt to further workup could be done as an outpatient.  His chest tube was transitioned to a Heimlich valve and he was discharged home    Final Diagnoses: Same as principal problem.    Disposition: Home-Health Care [facility]    Follow up/Patient Instructions:    Medications:  Reconciled Home Medications:   Discharge Medication List as of 12/10/2017  3:35 PM      CONTINUE these medications which have NOT CHANGED    Details   loperamide (IMODIUM) 2 mg capsule Take 2 mg by mouth 4 (four) times daily as needed for Diarrhea., Historical Med      pantoprazole (PROTONIX) 20 MG tablet Take 20 mg by mouth once daily., Until Discontinued, Historical Med       PROAIR HFA 90 mcg/actuation inhaler Starting 2/11/2016, Until Discontinued, Historical Med      simethicone (MYLICON) 80 MG chewable tablet Take 80 mg by mouth every 6 (six) hours as needed for Flatulence., Until Discontinued, Historical Med         STOP taking these medications       escitalopram oxalate (LEXAPRO) 10 MG tablet Comments:   Reason for Stopping:         trazodone (DESYREL) 50 MG tablet Comments:   Reason for Stopping:               Discharge Procedure Orders  Ambulatory referral to Home Health   Referral Priority: Routine Referral Type: Home Health   Referral Reason: Specialty Services Required    Requested Specialty: Home Health Services    Number of Visits Requested: 1      Diet general     Activity as tolerated       Follow-up Information     Franki Stone MD In 1 week.    Specialty:  Cardiothoracic Surgery  Why:  chronic pneumothorax  Contact information:  [address] [facility] [address] [phone]             Schedule an appointment as soon as possible for a visit with [facility] - PULMONOLOGY.    Specialty:  Pulmonary Disease  Why:  any provider- first available  Contact information:  [address] [facility] [address] [phone]             [facility].    Specialties:  DME Provider, Home Health Services  Why:  Home Health  Contact information:  [address] [place] [id number]  [phone]                   Discharge Procedure Orders (must include Diet, Follow-up, Activity):    Discharge Procedure Orders (must include Diet, Follow-up, Activity)  Ambulatory referral to Home Health   Referral Priority: Routine Referral Type: Home Health   Referral Reason: Specialty Services Required    Requested Specialty: Home Health Services    Number of Visits Requested: 1      Diet general     Activity as tolerated

## 2017-12-11 NOTE — PLAN OF CARE
Problem: Patient Care Overview  Goal: Plan of Care Review  Outcome: Ongoing (interventions implemented as appropriate)  Pt on RA with sats of 94%. Will continue to monitor.

## 2017-12-11 NOTE — H&P
"[facility]  General Surgery  History & Physical    Patient Name: Moises Hernandez  MRN: [id number]  Admission Date: 12/11/2017  Attending Physician: Theodora Arciniega MD   Primary Care Provider: Rodriguez Castro MD    Patient information was obtained from patient, relative(s) and ER records.     Subjective:     Chief Complaint/Reason for Admission: abd pain, nv      History of Present Illness: Patient just discharged 12/10 after being treated for psbo and chronic pneumothorax. He went home and had decreased po intake and dyspepsia. He had worsening abdominal distension, pain and nv.   The pain is described as burning, cramping, pressure-like and sharp, and is severe/10 in intensity. Pain is located in the periumbilical bilateral without radiation. Onset was 1 day ago. Symptoms have been gradually worsening since. Aggravating factors: none.  Alleviating factors: none. Associated symptoms: constipation, nausea and vomiting, anorexia. The patient denies arthralgias, chills, diarrhea, dysuria, fever, hematochezia and hematuria. Last bm "blow out" 12/10 morning, and he is having small intermittent flatus from ostomy. He has not been taking any new medications. He has stopped lomotil. He does not measure daily ostomy output.          Current Facility-Administered Medications on File Prior to Encounter   Medication    [COMPLETED] potassium chloride 10% solution 20 mEq    [DISCONTINUED] 0.9%  NaCl infusion    [DISCONTINUED] acetaminophen tablet 650 mg    [DISCONTINUED] ergocalciferol capsule 50,000 Units    [DISCONTINUED] ergocalciferol capsule 50,000 Units    [DISCONTINUED] morphine injection 2 mg    [DISCONTINUED] morphine injection 4 mg    [DISCONTINUED] ondansetron injection 4 mg    [DISCONTINUED] pantoprazole EC tablet 40 mg    [DISCONTINUED] sodium bicarbonate 1 mEq/mL (8.4 %) 75 mEq in dextrose 5 % and 0.45 % NaCl 1,000 mL infusion    [DISCONTINUED] sodium bicarbonate tablet 650 mg "     Current Outpatient Prescriptions on File Prior to Encounter   Medication Sig    loperamide (IMODIUM) 2 mg capsule Take 2 mg by mouth 4 (four) times daily as needed for Diarrhea.    pantoprazole (PROTONIX) 20 MG tablet Take 20 mg by mouth once daily.    PROAIR HFA 90 mcg/actuation inhaler     simethicone (MYLICON) 80 MG chewable tablet Take 80 mg by mouth every 6 (six) hours as needed for Flatulence.       Review of patient's allergies indicates:  No Known Allergies    Past Medical History:   Diagnosis Date    C. difficile diarrhea     COPD (chronic obstructive pulmonary disease)      Past Surgical History:   Procedure Laterality Date    ABDOMINAL SURGERY      EYE SURGERY      HERNIA REPAIR      ILEOSTOMY      SPLENECTOMY, TOTAL       Family History     Problem Relation (Age of Onset)    Cancer Mother    Heart attack Father        Social History Main Topics    Smoking status: Former Smoker     Packs/day: 1.00     Years: 60.00     Types: Cigarettes     Quit date: 2015    Smokeless tobacco: Never Used    Alcohol use Yes    Drug use: Unknown    Sexual activity: Not on file     Review of Systems   Constitutional: Positive for appetite change and fatigue. Negative for chills and fever.   HENT: Negative for congestion and sore throat.    Eyes: Negative for photophobia and visual disturbance.   Respiratory: Negative for cough and shortness of breath.    Cardiovascular: Negative for chest pain and palpitations.   Gastrointestinal: Positive for abdominal distention, abdominal pain, constipation, nausea and vomiting.   Genitourinary: Negative for dysuria, frequency and hematuria.   Musculoskeletal: Negative for back pain and gait problem.   Skin: Negative for rash and wound.   Neurological: Negative for seizures and headaches.   Hematological: Negative for adenopathy. Does not bruise/bleed easily.   Psychiatric/Behavioral: Negative for sleep disturbance. The patient is not nervous/anxious.      Objective:      Vital Signs (Most Recent):  Temp: 98.2 °F (36.8 °C) (12/11/17 0759)  Pulse: 95 (12/11/17 0759)  Resp: 16 (12/11/17 0759)  BP: 119/65 (12/11/17 0759)  SpO2: 97 % (12/11/17 0610) Vital Signs (24h Range):  Temp:  [97.2 °F (36.2 °C)-98.9 °F (37.2 °C)] 98.2 °F (36.8 °C)  Pulse:  [] 95  Resp:  [14-19] 16  SpO2:  [97 %-99 %] 97 %  BP: ()/(53-86) 119/65     Weight: 41.3 kg (91 lb)  Body mass index is 14.25 kg/m².    Physical Exam   Constitutional: He is oriented to person, place, and time. He appears well-developed and well-nourished. No distress.   HENT:   Head: Normocephalic and atraumatic.   Eyes: Conjunctivae and EOM are normal. No scleral icterus.   Neck: Normal range of motion.   Cardiovascular: Normal rate and intact distal pulses.    Pulmonary/Chest: Effort normal. No stridor. No respiratory distress.   Abdominal: Soft. He exhibits distension. There is tenderness.   nonfocal  Ileo pink and patent, digitalized today   Musculoskeletal: Normal range of motion. He exhibits no edema or tenderness.   Neurological: He is alert and oriented to person, place, and time.   Skin: No rash noted. He is not diaphoretic. No pallor.   Psychiatric: He has a normal mood and affect. His behavior is normal.       Significant Labs:  CBC:   Recent Labs  Lab 12/11/17 0355   WBC 22.21*   RBC 4.26*   HGB 13.5*   HCT 39.8*      MCV 93   MCH 31.7*   MCHC 33.9     BMP:   Recent Labs  Lab 12/10/17  0448 12/11/17 0355   * 184*    138   K 3.6 4.4    101   CO2 23 20*   BUN 17 20   CREATININE 0.7 1.0   CALCIUM 8.3* 9.7   MG 1.7  --        Significant Diagnostics:  I have reviewed and interpreted all pertinent imaging results/findings within the past 24 hours.    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

## 2017-12-11 NOTE — ED NOTES
Pt admitted from home with daughter. Pt discharged from hospital yesterday afternoon. Pt c/o abdominal pain. Pt abdomen distended upon assessment. Pt with a RLQ ileostomy with no output. Pt daughter reports pt has been vomiting since being discharged and has not had any output in his ostomy. Pt with a chest tube to his R chest wall. Call light and belongings within reach. Daughter at bedside. Will continue to closely monitor.

## 2017-12-11 NOTE — SUBJECTIVE & OBJECTIVE
Current Facility-Administered Medications on File Prior to Encounter   Medication    [COMPLETED] potassium chloride 10% solution 20 mEq    [DISCONTINUED] 0.9%  NaCl infusion    [DISCONTINUED] acetaminophen tablet 650 mg    [DISCONTINUED] ergocalciferol capsule 50,000 Units    [DISCONTINUED] ergocalciferol capsule 50,000 Units    [DISCONTINUED] morphine injection 2 mg    [DISCONTINUED] morphine injection 4 mg    [DISCONTINUED] ondansetron injection 4 mg    [DISCONTINUED] pantoprazole EC tablet 40 mg    [DISCONTINUED] sodium bicarbonate 1 mEq/mL (8.4 %) 75 mEq in dextrose 5 % and 0.45 % NaCl 1,000 mL infusion    [DISCONTINUED] sodium bicarbonate tablet 650 mg     Current Outpatient Prescriptions on File Prior to Encounter   Medication Sig    loperamide (IMODIUM) 2 mg capsule Take 2 mg by mouth 4 (four) times daily as needed for Diarrhea.    pantoprazole (PROTONIX) 20 MG tablet Take 20 mg by mouth once daily.    PROAIR HFA 90 mcg/actuation inhaler     simethicone (MYLICON) 80 MG chewable tablet Take 80 mg by mouth every 6 (six) hours as needed for Flatulence.       Review of patient's allergies indicates:  No Known Allergies    Past Medical History:   Diagnosis Date    C. difficile diarrhea     COPD (chronic obstructive pulmonary disease)      Past Surgical History:   Procedure Laterality Date    ABDOMINAL SURGERY      EYE SURGERY      HERNIA REPAIR      ILEOSTOMY      SPLENECTOMY, TOTAL       Family History     Problem Relation (Age of Onset)    Cancer Mother    Heart attack Father        Social History Main Topics    Smoking status: Former Smoker     Packs/day: 1.00     Years: 60.00     Types: Cigarettes     Quit date: 2015    Smokeless tobacco: Never Used    Alcohol use Yes    Drug use: Unknown    Sexual activity: Not on file     Review of Systems   Constitutional: Positive for appetite change and fatigue. Negative for chills and fever.   HENT: Negative for congestion and sore throat.     Eyes: Negative for photophobia and visual disturbance.   Respiratory: Negative for cough and shortness of breath.    Cardiovascular: Negative for chest pain and palpitations.   Gastrointestinal: Positive for abdominal distention, abdominal pain, constipation, nausea and vomiting.   Genitourinary: Negative for dysuria, frequency and hematuria.   Musculoskeletal: Negative for back pain and gait problem.   Skin: Negative for rash and wound.   Neurological: Negative for seizures and headaches.   Hematological: Negative for adenopathy. Does not bruise/bleed easily.   Psychiatric/Behavioral: Negative for sleep disturbance. The patient is not nervous/anxious.      Objective:     Vital Signs (Most Recent):  Temp: 98.2 °F (36.8 °C) (12/11/17 0759)  Pulse: 95 (12/11/17 0759)  Resp: 16 (12/11/17 0759)  BP: 119/65 (12/11/17 0759)  SpO2: 97 % (12/11/17 0610) Vital Signs (24h Range):  Temp:  [97.2 °F (36.2 °C)-98.9 °F (37.2 °C)] 98.2 °F (36.8 °C)  Pulse:  [] 95  Resp:  [14-19] 16  SpO2:  [97 %-99 %] 97 %  BP: ()/(53-86) 119/65     Weight: 41.3 kg (91 lb)  Body mass index is 14.25 kg/m².    Physical Exam   Constitutional: He is oriented to person, place, and time. He appears well-developed and well-nourished. No distress.   HENT:   Head: Normocephalic and atraumatic.   Eyes: Conjunctivae and EOM are normal. No scleral icterus.   Neck: Normal range of motion.   Cardiovascular: Normal rate and intact distal pulses.    Pulmonary/Chest: Effort normal. No stridor. No respiratory distress.   Abdominal: Soft. He exhibits distension. There is tenderness.   nonfocal  Ileo pink and patent, digitalized today   Musculoskeletal: Normal range of motion. He exhibits no edema or tenderness.   Neurological: He is alert and oriented to person, place, and time.   Skin: No rash noted. He is not diaphoretic. No pallor.   Psychiatric: He has a normal mood and affect. His behavior is normal.       Significant Labs:  CBC:   Recent Labs  Lab  12/11/17  0355   WBC 22.21*   RBC 4.26*   HGB 13.5*   HCT 39.8*      MCV 93   MCH 31.7*   MCHC 33.9     BMP:   Recent Labs  Lab 12/10/17  0448 12/11/17 0355   * 184*    138   K 3.6 4.4    101   CO2 23 20*   BUN 17 20   CREATININE 0.7 1.0   CALCIUM 8.3* 9.7   MG 1.7  --        Significant Diagnostics:  I have reviewed and interpreted all pertinent imaging results/findings within the past 24 hours.

## 2017-12-11 NOTE — HPI
"Patient just discharged 12/10 after being treated for psbo and chronic pneumothorax. He went home and had decreased po intake and dyspepsia. He had worsening abdominal distension, pain and nv.   The pain is described as burning, cramping, pressure-like and sharp, and is severe/10 in intensity. Pain is located in the periumbilical bilateral without radiation. Onset was 1 day ago. Symptoms have been gradually worsening since. Aggravating factors: none.  Alleviating factors: none. Associated symptoms: constipation, nausea and vomiting, anorexia. The patient denies arthralgias, chills, diarrhea, dysuria, fever, hematochezia and hematuria. Last bm "blow out" 12/10 morning, and he is having small intermittent flatus from ostomy. He has not been taking any new medications. He has stopped lomotil. He does not measure daily ostomy output.        "

## 2017-12-12 NOTE — PLAN OF CARE
Problem: Patient Care Overview  Goal: Plan of Care Review  Outcome: Ongoing (interventions implemented as appropriate)  Patient is AAOx4. Denies pain at this time. ostomy emptied per sister at the bedside. Pt NPO status at this time. Safety maintained. Bed in lowest locked position. Bed alarm sound.  Will continue to monitor.

## 2017-12-12 NOTE — PLAN OF CARE
Problem: Patient Care Overview  Goal: Plan of Care Review  Outcome: Ongoing (interventions implemented as appropriate)  Patient is aaox4, calm cooperative.  Abdomen was firm and painful during morning assessment.  NG tube placed per MD orders, as well as a PICC line to the left arm.  Tube had to be advanced approximately 10 centimeters.  Foul-smelling brown liquid received back.  LLQ ostomy has filled up several times with family emptying ostomy.  Family has been advised to call nursing staff when ostomy needs to be emptied so that accurate I&O's can be recorded.  Family verbalizes understanding.  Patient remains NPO per MD order.  Will continue to monitor.

## 2017-12-12 NOTE — PLAN OF CARE
TN met with  Patient and daughter  pollo Sanchez discharged yesterday evening, return with same symptoms of cramping and pain.  NG tube in place.      On prior discharge patient, arranged with HH. Daughter would like HH not to be arranged. Pt has a lot of family support and travels to different family homes. Pt  Receives all colostomy supplies from [employer]. Daughter states patient has a Heimlich valve and questioning if supplies are needed. TN to clarify care needed at home with  Bedside RN and team.              12/11/17 1458   Discharge Assessment   Assessment Type Discharge Planning Assessment   Confirmed/corrected address and phone number on facesheet? Yes   Assessment information obtained from? Patient;Caregiver  (Susan- [phone])   Communicated expected length of stay with patient/caregiver yes   Prior to hospitilization cognitive status: Alert/Oriented   Prior to hospitalization functional status: Assistive Equipment   Current cognitive status: Alert/Oriented   Current Functional Status: Assistive Equipment   Lives With child(inna), adult   Able to Return to Prior Arrangements yes   Is patient able to care for self after discharge? Unable to determine at this time (comments)   Who are your caregiver(s) and their phone number(s)? susan davis- [phone]   Patient's perception of discharge disposition home or selfcare  (pt does not want HH .)   Readmission Within The Last 30 Days other (see comments)  (pt not feeling well. )   Patient currently being followed by outpatient case management? No   Patient currently receives any other outside agency services? No   Equipment Currently Used at Home 3-in-1 commode;walker, rolling;cane, straight;colostomy/ostomy supplies   Do you have any problems affording any of your prescribed medications? No   Is the patient taking medications as prescribed? yes   Does the patient have transportation home? Yes   Transportation Available family or friend will provide   Does  the patient receive services at the [facility]? No   Discharge Plan A Home with family   Discharge Plan B Home with family   Patient/Family In Agreement With Plan yes

## 2017-12-12 NOTE — PROGRESS NOTES
"[facility]  General Surgery  Progress Note    Subjective:     History of Present Illness:  Patient just discharged 12/10 after being treated for psbo and chronic pneumothorax. He went home and had decreased po intake and dyspepsia. He had worsening abdominal distension, pain and nv.   The pain is described as burning, cramping, pressure-like and sharp, and is severe/10 in intensity. Pain is located in the periumbilical bilateral without radiation. Onset was 1 day ago. Symptoms have been gradually worsening since. Aggravating factors: none.  Alleviating factors: none. Associated symptoms: constipation, nausea and vomiting, anorexia. The patient denies arthralgias, chills, diarrhea, dysuria, fever, hematochezia and hematuria. Last bm "blow out" 12/10 morning, and he is having small intermittent flatus from ostomy. He has not been taking any new medications. He has stopped lomotil. He does not measure daily ostomy output.          Post-Op Info:  * No surgery found *         Interval History: Abdominal pain, nausea, and vomiting resolved.  NG output clear yellow 150 cc since admission.  Ileostomy output suddenly increased last night and has put out 300+ cc of stool and "a ton" of gas.    Medications:  Continuous Infusions:   lactated ringers 125 mL/hr at 12/11/17 1724     Scheduled Meds:   pantoprazole  40 mg Intravenous Daily     PRN Meds:morphine, morphine, ondansetron     Review of patient's allergies indicates:  No Known Allergies  Objective:     Vital Signs (Most Recent):  Temp: 97.9 °F (36.6 °C) (12/12/17 0525)  Pulse: 75 (12/12/17 0525)  Resp: 18 (12/12/17 0525)  BP: (!) 100/59 (12/12/17 0525)  SpO2: (!) 93 % (12/11/17 1935) Vital Signs (24h Range):  Temp:  [97.2 °F (36.2 °C)-98.7 °F (37.1 °C)] 97.9 °F (36.6 °C)  Pulse:  [] 75  Resp:  [18] 18  SpO2:  [93 %-94 %] 93 %  BP: (100-152)/(59-80) 100/59     Weight: 41.3 kg (91 lb)  Body mass index is 14.25 kg/m².    Intake/Output - Last 3 " Shifts       12/10 0700 - 12/11 0659 12/11 0700 - 12/12 0659 12/12 0700 - 12/13 0659    I.V. (mL/kg)  1183.3 (28.7)     Total Intake(mL/kg)  1183.3 (28.7)     Urine (mL/kg/hr)  900 (0.9)     Drains  150 (0.2)     Stool  300 (0.3)     Chest Tube  0 (0)     Total Output   1350      Net   -166.7             Stool Occurrence  1 x           Physical Exam   Constitutional: He is oriented to person, place, and time. He appears cachectic. No distress.   HENT:   Head: Normocephalic and atraumatic.   Eyes: EOM are normal. No scleral icterus.   Neck: Normal range of motion.   Cardiovascular: Normal rate and regular rhythm.    Pulmonary/Chest: Effort normal. No respiratory distress.   No breath sounds on right; chest tube in place on right with Heimlich valve in place   Abdominal: Soft. He exhibits no distension. There is no tenderness. There is no rebound and no guarding.   Ileostomy present RLQ   Musculoskeletal: He exhibits no edema.   Neurological: He is alert and oriented to person, place, and time.   Skin: He is not diaphoretic.   Vitals reviewed.      Significant Labs:  CBC:   Recent Labs  Lab 12/12/17  0423   WBC 9.60   RBC 3.66*   HGB 11.3*   HCT 34.3*      MCV 94   MCH 30.9   MCHC 32.9     CMP:   Recent Labs  Lab 12/12/17  0422   GLU 83   CALCIUM 8.6*   ALBUMIN 2.7*   PROT 5.8*      K 4.1   CO2 24      BUN 23   CREATININE 0.8   ALKPHOS 74   ALT 20   AST 23   BILITOT 1.1*       Significant Diagnostics:  n/a    Assessment/Plan:     SBO (small bowel obstruction)    -SBO resolving  -Clamp NG tube; advance to clear liquids; if tolerates, then will remove Ng and advance to regular        Failure to thrive in adult    -Will have to consider TPN if pSBO does not resolve        Pneumothorax    -Chronic in nature encompassing nearly all of the right thorax  -chest tube in place with Heimlich valve            Ari House Jr., MD  General Surgery  [facility]

## 2017-12-12 NOTE — ASSESSMENT & PLAN NOTE
-Chronic in nature encompassing nearly all of the right thorax  -chest tube in place with Heimlich valve

## 2017-12-12 NOTE — ASSESSMENT & PLAN NOTE
-SBO resolving  -Clamp NG tube; advance to clear liquids; if tolerates, then will remove Ng and advance to regular

## 2017-12-12 NOTE — PLAN OF CARE
Problem: Patient Care Overview  Goal: Plan of Care Review  Outcome: Ongoing (interventions implemented as appropriate)  Pt on RA with sats of 93%. Will continue to monitor.

## 2017-12-12 NOTE — PLAN OF CARE
D/c orders noted. Patient to d/c this evening. Pt has DME in place. Per Daughter Susan, No HH wanted at this time.  Daughter will make pulm and Surgery follow up to assure availability.  TN has provided daughter with contact number of TN, if needed.      Jabier, bedside RN  in room discharging patient. Jabier has provided  written information to daughter regarding Heimlich Valve care at home.     No other d/c needs at this time.        12/12/17 1740   Final Note   Assessment Type Final Discharge Note   Discharge Disposition Home   What phone number can be called within the next 1-3 days to see how you are doing after discharge? [phone]   Hospital Follow Up  Appt(s) scheduled? No  (Patient's daughter susan will make in am. TN will verify appts made)   Discharge plans and expectations educations in teach back method with documentation complete? Yes   Right Care Referral Info   Post Acute Recommendation No Care  (family refusing HH at this time. )

## 2017-12-12 NOTE — SUBJECTIVE & OBJECTIVE
"Interval History: Abdominal pain, nausea, and vomiting resolved.  NG output clear yellow 150 cc since admission.  Ileostomy output suddenly increased last night and has put out 300+ cc of stool and "a ton" of gas.    Medications:  Continuous Infusions:   lactated ringers 125 mL/hr at 12/11/17 1724     Scheduled Meds:   pantoprazole  40 mg Intravenous Daily     PRN Meds:morphine, morphine, ondansetron     Review of patient's allergies indicates:  No Known Allergies  Objective:     Vital Signs (Most Recent):  Temp: 97.9 °F (36.6 °C) (12/12/17 0525)  Pulse: 75 (12/12/17 0525)  Resp: 18 (12/12/17 0525)  BP: (!) 100/59 (12/12/17 0525)  SpO2: (!) 93 % (12/11/17 1935) Vital Signs (24h Range):  Temp:  [97.2 °F (36.2 °C)-98.7 °F (37.1 °C)] 97.9 °F (36.6 °C)  Pulse:  [] 75  Resp:  [18] 18  SpO2:  [93 %-94 %] 93 %  BP: (100-152)/(59-80) 100/59     Weight: 41.3 kg (91 lb)  Body mass index is 14.25 kg/m².    Intake/Output - Last 3 Shifts       12/10 0700 - 12/11 0659 12/11 0700 - 12/12 0659 12/12 0700 - 12/13 0659    I.V. (mL/kg)  1183.3 (28.7)     Total Intake(mL/kg)  1183.3 (28.7)     Urine (mL/kg/hr)  900 (0.9)     Drains  150 (0.2)     Stool  300 (0.3)     Chest Tube  0 (0)     Total Output   1350      Net   -166.7             Stool Occurrence  1 x           Physical Exam   Constitutional: He is oriented to person, place, and time. He appears cachectic. No distress.   HENT:   Head: Normocephalic and atraumatic.   Eyes: EOM are normal. No scleral icterus.   Neck: Normal range of motion.   Cardiovascular: Normal rate and regular rhythm.    Pulmonary/Chest: Effort normal. No respiratory distress.   No breath sounds on right; chest tube in place on right with Heimlich valve in place   Abdominal: Soft. He exhibits no distension. There is no tenderness. There is no rebound and no guarding.   Ileostomy present RLQ   Musculoskeletal: He exhibits no edema.   Neurological: He is alert and oriented to person, place, and time. "   Skin: He is not diaphoretic.   Vitals reviewed.      Significant Labs:  CBC:   Recent Labs  Lab 12/12/17 0423   WBC 9.60   RBC 3.66*   HGB 11.3*   HCT 34.3*      MCV 94   MCH 30.9   MCHC 32.9     CMP:   Recent Labs  Lab 12/12/17 0422   GLU 83   CALCIUM 8.6*   ALBUMIN 2.7*   PROT 5.8*      K 4.1   CO2 24      BUN 23   CREATININE 0.8   ALKPHOS 74   ALT 20   AST 23   BILITOT 1.1*       Significant Diagnostics:  n/a

## 2017-12-12 NOTE — PLAN OF CARE
No future appointments.     12/11/17 1701   Readmission Questionnaire   At the time of your discharge, did someone talk to you about what your health problems were? Yes   At the time of discharge, did someone talk to you about what to watch out for regarding worsening of your health problem? Yes   At the time of discharge, did someone talk to you about what to do if you experienced worsening of your health problem? Yes   At the time of discharge, did someone talk to you about which medication to take when you left the hospital and which ones to stop taking? Yes   At the time of discharge, did someone talk to you about when and where to follow up with a doctor after you left the hospital? Yes   What do you believe caused you to be sick enough to be re-admitted? not feeling well   How often do you need to have someone help you when you read instructions, pamphlets, or other written material from your doctor or pharmacy? Often   Do you have problems taking your medications as prescribed? No   Do you have any problems affording any of  your prescribed medications? Yes   Do you have problems obtaining/receiving your medications? Yes   Does the patient have transportation to healthcare appointments? Yes   Lives With child(inna), adult;other relative(s)   Living Arrangements house   Does the patient have family/friends to help with healtcare needs after discharge? yes   Who are your caregiver(s) and their phone number(s)? susan    Does your caregiver provide all the help you need? Yes   Are you currently feeling confused? No   Are you currently having problems thinking? No   Are you currently having memory problems? No   Have you felt down, depressed, or hopeless? 0   Have you felt little interest or pleasure in doing things? 0   In the last 7 days, my sleep quality was: fair

## 2017-12-13 NOTE — PATIENT INSTRUCTIONS
Small Bowel Obstruction     Small bowel obstruction can lead to tissue damage and even tissue death.   A small bowel obstruction occurs when part or all of the small intestine (bowel) is blocked. As a result, digestive contents cant move through the bowel properly and out of the body. Treatment is needed right away to remove the blockage. This can ease painful symptoms. It can also prevent serious problems, such as tissue death or bursting (rupture) of the small bowel. Without treatment, a small bowel obstruction can be fatal.  Causes of small bowel obstruction  A small bowel obstruction can be caused by:  · Scar tissue (adhesions). These may form after belly (abdominal) surgery or an infection.  · Hernia. A hernia is when an organ pushes through a weak spot or tear in the abdomen wall. Part of the small bowel can push out and be seen as a bulge under the belly. Hernias can also occur internally.  · Certain health problems. These include when part of the bowel slides inside another part (intussusception). Other causes include irritable bowel disease such as Crohns disease, and inflammation and sores in the intestine (ulcerative colitis).  · Abnormal tissue growths (tumors). These can form on the inside or outside of the small bowel. They are usually due to cancer.  Symptoms of small bowel obstruction  Common symptoms include:  · Belly cramping and pain  · Belly swelling and bloating  · Upset stomach (nausea) and vomiting  · Can't  pass gas  · Can't pass stool (constipation)  · Diarrhea  Diagnosing small bowel obstruction  Your provider will ask about your symptoms and health history. Youll also have a physical exam. Tests may also be done to confirm the problem. These can include:  · Imaging tests. These provide pictures of the small bowel. Common tests include X-rays and a CT scan.  · Blood tests. These check for infection and other problems, such as excess fluid loss (dehydration).  · Upper GI  (gastrointestinal) series with a small bowel follow-through. This test takes X-rays of the upper digestive tract from the mouth through the small bowel. An X-ray dye (contrast fluid) is used. The dye coats the inside of your upper digestive tract so it will show up clearly on X-rays.  Treating small bowel obstruction  Treatment takes place in a hospital. As part of your care, the following may be done:  · No food or drink is given by mouth. This allows your bowels to rest.  · An IV (intravenous) line is placed in a vein in your arm or hand. The IV line is used to give fluids. It may also be used to give medicines. These may be needed to ease pain, nausea, and other symptoms. They may also be needed to treat or prevent infections.  · A soft, thin, flexible tube (nasogastric tube) is inserted through your nose and into your stomach. The tube is used to remove extra gas and fluid in your stomach and bowels. This helps to ease symptoms such as pain and swelling.  · In severe cases, surgery is done. This may be needed if the small bowel is almost or totally blocked, or there is a hole in the bowel (bowel perforation). During surgery, the blockage is removed. Parts of the bowel may also be removed if there is tissue death. Other repair may be done as well, depending on what caused the blockage. Your healthcare provider will give you more information about surgery, if needed.  · Youll be watched closely in the hospital until your symptoms improve. Your provider will tell you when you can go home.  Long-term concerns   After treatment, most people recover with no lasting effects. If a long part of the bowel is removed, there is a greater chance for lifelong digestive problems. Bowel movements may become irregular. Work with your provider to learn the best ways to manage any symptoms you may have, and to protect your health.  When to call your healthcare provider  Call your provider right away if you have any of the  following:  · Severe pain (Call 911)  · Belly swelling or cramping that wont go away  · Cant pass stool or gas  · Nausea or vomiting (especially if the vomit looks or smells like stool)   Date Last Reviewed: 7/1/2016  © 8178-8162 [employer]. [address] [place] [address], [place] [address]. All rights reserved. This information is not intended as a substitute for professional medical care. Always follow your healthcare professional's instructions.

## 2017-12-14 NOTE — DISCHARGE SUMMARY
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 He was admitted, made NPO. His bowel function did return quickly after NG tube palcement. He began having ostomy output, his distention resolved and his diet was advanced. He tolerated this well and had no pain. At this point he was discharged home with regular diet. He also had a previously placed right sided pigtail chest tube on a heimlich valve. This continued to function well and he denied SOB.     Final Diagnoses:    Principal Problem: SBO (small bowel obstruction)   Secondary Diagnoses:   Active Hospital Problems    Diagnosis  POA    *SBO (small bowel obstruction) [K56.609]  Yes    Failure to thrive in adult [R62.7]  Yes    Pneumothorax [J93.9]  Yes    Physical deconditioning [R53.81]  Yes    COPD (chronic obstructive pulmonary disease) with acute bronchitis [J44.0, J20.9]  Yes      Resolved Hospital Problems    Diagnosis Date Resolved POA   No resolved problems to display.       Discharged Condition: stable    Disposition: Home or Self Care    Follow up/Patient Instructions:    Medications:  Reconciled Home Medications:   Discharge Medication List as of 12/12/2017  5:31 PM      START taking these medications    Details   ondansetron (ZOFRAN-ODT) 4 MG TbDL Take 2 tablets (8 mg total) by mouth every 8 (eight) hours as needed., Starting Tue 12/12/2017, Normal         CONTINUE these medications which have NOT CHANGED    Details   loperamide (IMODIUM) 2 mg capsule Take 2 mg by mouth 4 (four) times daily as needed for Diarrhea., Historical Med      pantoprazole (PROTONIX)  20 MG tablet Take 20 mg by mouth once daily., Until Discontinued, Historical Med      PROAIR HFA 90 mcg/actuation inhaler Starting 2/11/2016, Until Discontinued, Historical Med      simethicone (MYLICON) 80 MG chewable tablet Take 80 mg by mouth every 6 (six) hours as needed for Flatulence., Until Discontinued, Historical Med             Discharge Procedure Orders  Diet general       Follow-up Information     Theodora Arciniega MD In 2 weeks.    Specialties:  Surgery, General Surgery  Contact information:  [address]  [address]  [facility] [id number] [phone]

## 2017-12-17 PROBLEM — K56.601 COMPLETE INTESTINAL OBSTRUCTION: Status: ACTIVE | Noted: 2017-01-01

## 2017-12-17 NOTE — H&P
Patient ID: Moises Hernandez is a 82 y.o. male.    Chief Complaint: Abdominal Pain (Patient presents to the ED with reports of having abdominal pain with distention, nausea, and vomiting. Reports having a colostomy bag that has not been draining. Pain that started yesterday. Discharged from hospital admission x 5 days ago. 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 Presented to the hospital this morning with recurrent symptoms of adhesive bowel obstruction. He has had no ostomy output since yesterday.  He reports diffuse abdominal pain with nv as well as ab distention. His symptoms improved somewhat with NG tube placement in ED.  He was admitted to our service 5 days ago with the same problem and resolved quickly with conservative management.         Review of Systems   Constitutional: Negative for fever.   HENT: Negative for trouble swallowing.    Respiratory: Negative for shortness of breath.    Cardiovascular: Negative for chest pain.   Gastrointestinal: Positive for abdominal distention, abdominal pain, nausea and vomiting.   Genitourinary: Negative for dysuria.   Musculoskeletal: Negative for gait problem.   Skin: Negative for rash.   Hematological: Does not bruise/bleed easily.   Psychiatric/Behavioral: Negative for agitation.       Current Facility-Administered Medications   Medication Dose Route Frequency Provider Last Rate Last Dose    dextrose 5 % and 0.9 % NaCl infusion   Intravenous Continuous Piero Ugarte MD        heparin (porcine) injection 5,000 Units  5,000 Units Subcutaneous Q8H Jabier Deleon MD        morphine injection 2 mg  2 mg Intravenous Q4H PRN Jabier Deleon MD        morphine injection 4 mg  4 mg Intravenous Q4H PRN Jabier Deleon MD   4 mg at 12/17/17 0936    ondansetron injection 4 mg  4 mg Intravenous Q8H PRN Jabier Deleon MD        pantoprazole injection 40 mg  40 mg Intravenous Daily  Jabier Deleon MD   40 mg at 12/17/17 0935    promethazine (PHENERGAN) 6.25 mg in dextrose 5 % 50 mL IVPB  6.25 mg Intravenous Q6H PRN Jabier Deleon MD           Review of patient's allergies indicates:  No Known Allergies    Past Medical History:   Diagnosis Date    C. difficile diarrhea     COPD (chronic obstructive pulmonary disease)        Past Surgical History:   Procedure Laterality Date    ABDOMINAL SURGERY      EYE SURGERY      HERNIA REPAIR      ILEOSTOMY      SPLENECTOMY, TOTAL         Family History   Problem Relation Age of Onset    Cancer Mother      stomach    Heart attack Father        Social History     Social History    Marital status:      Spouse name: N/A    Number of children: N/A    Years of education: N/A     Occupational History    Not on file.     Social History Main Topics    Smoking status: Former Smoker     Packs/day: 1.00     Years: 60.00     Types: Cigarettes     Quit date: 2015    Smokeless tobacco: Never Used    Alcohol use Yes    Drug use: No    Sexual activity: Not on file     Other Topics Concern    Not on file     Social History Narrative    No narrative on file       Vitals:    12/17/17 0954   BP: (!) 141/73   Pulse: 92   Resp: 12   Temp: 98.4 °F (36.9 °C)       Physical Exam   Constitutional: He is oriented to person, place, and time. No distress.   HENT:   Head: Atraumatic.   Cardiovascular: Normal rate and regular rhythm.    Pulmonary/Chest: Effort normal. No stridor. No respiratory distress.   Right sided heimlich valve in place   Abdominal: Soft. He exhibits distension. There is no tenderness.   End ileostomy in place, tight at the skin level but not obstructing. Digitalized with no palpable obstruction   Musculoskeletal: He exhibits no edema.   Lymphadenopathy:     He has no cervical adenopathy.   Neurological: He is alert and oriented to person, place, and time.   Skin: Skin is warm. He is not diaphoretic. No erythema.   Psychiatric: He has  a normal mood and affect.     K 5.4  WBC 19  XR shows multiple distended loops of small bowel    Assessment & Plan:   82M with recurrent adhesive bowel obstruction  NG to suction  IVF  NPO for now  May need ex lap if this continues to be frequent recurrent problem

## 2017-12-17 NOTE — PLAN OF CARE
Problem: Patient Care Overview  Goal: Plan of Care Review  Outcome: Ongoing (interventions implemented as appropriate)  Pt AAOx4. NAD noted. Comaplints of abdomen pain with relief from morphine. IV fluids infusing per orders. NG tube to right nare to low intermittent wall suction. No current output; MD informed. Pt passing gas per ostomy. Tele monitoring. NPO. mepliex on coccyx. Pt voiding well. Safety mainted; pt encouraged to call for needs and assistance. Will continue to monitor.

## 2017-12-17 NOTE — ED PROVIDER NOTES
Encounter Date: 12/17/2017    SCRIBE #1 NOTE: I, Jeff Zamora, am scribing for, and in the presence of,  Marek Stuart MD. I have scribed the entire note.       History     Chief Complaint   Patient presents with    Abdominal Pain     Patient presents to the ED with reports of having abdominal pain with distention, nausea, and vomiting. Reports having a colostomy bag that has not been draining. Pain that started yesterday. Discharged from hospital admission x 5 days ago.      Time patient was seen by the provider: 5:06 AM      The patient is a 82 y.o. male that presents to the ED with a complaint of abdominal pain and lack of output at the site of his colostomy. He has had a long standing history of multiple bowel obstructions, most recent admission 12/11 12/12.  He is reports a constant and worsening feeling of bloating with increasingly poor output since his inpatient stay. He reports abdominal pain, bloating, nausea/vomiting but denies fever, chest pain, or shortness of breath.  .           Review of patient's allergies indicates:  No Known Allergies  Past Medical History:   Diagnosis Date    C. difficile diarrhea     COPD (chronic obstructive pulmonary disease)      Past Surgical History:   Procedure Laterality Date    ABDOMINAL SURGERY      EYE SURGERY      HERNIA REPAIR      ILEOSTOMY      SPLENECTOMY, TOTAL       Family History   Problem Relation Age of Onset    Cancer Mother      stomach    Heart attack Father      Social History   Substance Use Topics    Smoking status: Former Smoker     Packs/day: 1.00     Years: 60.00     Types: Cigarettes     Quit date: 2015    Smokeless tobacco: Never Used    Alcohol use Yes     Review of Systems   Constitutional: Negative for fever.   HENT: Negative for sore throat.    Respiratory: Negative for shortness of breath.    Cardiovascular: Negative for chest pain.   Gastrointestinal: Positive for abdominal pain, nausea and vomiting.        Poor ostomy output  "  Genitourinary: Negative for dysuria.   Musculoskeletal: Negative for back pain.   Skin: Negative for rash.   Neurological: Negative for weakness.   Hematological: Does not bruise/bleed easily.       Physical Exam     Vitals:    12/17/17 0510 12/17/17 0616   BP: (!) 106/57 124/75   BP Location: Right arm Left arm   Patient Position: Sitting Lying   Pulse: (!) 115 99   Resp: 20 18   Temp: 98 °F (36.7 °C)    TempSrc: Oral    SpO2: 97% 98%   Weight: 41.3 kg (91 lb)    Height: 5' 7" (1.702 m)          Physical Exam    Nursing note and vitals reviewed.  Constitutional:   Cacechtic, thin with temporal wasting,    HENT:   Head: Normocephalic and atraumatic.   Eyes: Conjunctivae are normal.   Neck: Neck supple.   Cardiovascular: Normal rate, regular rhythm, normal heart sounds and intact distal pulses. Exam reveals no gallop and no friction rub.    No murmur heard.  Pulmonary/Chest: Breath sounds normal. He has no wheezes. He has no rhonchi. He has no rales.   Right sided chest tube with clean dressings   Abdominal: Soft. He exhibits no distension.   Midline ostomy to lower abdominal wall with pink and well perfused stoma   Musculoskeletal: Normal range of motion.   Neurological: He is alert and oriented to person, place, and time.   Skin: No rash noted. No erythema.   Psychiatric: He has a normal mood and affect.         ED Course   Procedures  Labs Reviewed   CBC WITHOUT DIFFERENTIAL - Abnormal; Notable for the following:        Result Value    WBC 19.05 (*)     RBC 4.37 (*)     Hemoglobin 13.8 (*)     MCH 31.6 (*)     All other components within normal limits   COMPREHENSIVE METABOLIC PANEL - Abnormal; Notable for the following:     Sodium 133 (*)     Potassium 5.4 (*)     CO2 17 (*)     Glucose 164 (*)     BUN, Bld 26 (*)     eGFR if non  57 (*)     All other components within normal limits   LIPASE   LACTIC ACID, PLASMA     EKG Readings: (Independently Interpreted)   Initial Reading: No STEMI. Previous " EKG: Compared with most recent EKG   Sinus tach, PACs, non-specific St changes, no obvious ischemia  Compared with prior 12/11, no significant change          Medical Decision Making:   Initial Assessment:   The patient is a 82 y.o. male  that presents to the ED with a complaint of . Hx of recurrent bowel obstructions, most recently admitted to the hospital admitted 12/11-12/12 and discharged 5 days ago. Abdomen rigid, distended, no significant hard stool on digital manipulation of stoma. Will order basic labs, AAS, fluids, pain medication, antiemetics.   Differential Diagnosis:     Differential Diagnosis includes, but is not limited to:  AAA, aortic dissection, mesenteric ischemia, perforated viscous, MI/ACS, SBO/volvulus, incarcerated/strangulated hernia, intussusception, ileus, appendicitis, cholecystitis, cholangitis, diverticulitis, esophagitis, hepatitis, nephrolithiasis, pancreatitis, gastroenteritis, colitis, IBD/IBS, biliary colic, GERD, PUD, constipation, UTI/pyelonephritis,  disorder.      Clinical Tests:   Lab Tests: Ordered and Reviewed  Radiological Study: Ordered  At time of shift change, patient's ED workup incomplete. Oncoming ED physician to continue care. All relevant details were discussed, including pending workup and planned disposition. See summary below.    Pending: labs, x-ray chest/abdomen  Disposition: will likely require admission, NG tube for clinical evidence of recurrent SBO. Plan to consult surgery accordingly based on results of imaging/labs.        chest x-ray: 95 -00% pneumothorax on the right side with a pigtail catheter in place, there is no mediastinal shift or evidence of tension clinically the patient is in no distress re-comparing this to his previous chest x-ray there essentially is no change this was done post Heimlich valve placement  Abdominal x-ray: Markedly distended loops of small bowel consistent with small bowel obstruction is also evidence of stool in the right  side of the colon              ED Course     at 0627 hrs. this patient was signed out to me for follow-up on labs and x-ray I reexamined him and he has a distended abdomen generally tender with hypoactive bowel sounds are consistent with a recurrent bowel obstruction additionally has diminished breath sounds on the right side he has a Heimlich catheter which is present in his right chest under a dressing he is in no respiratory distress whatsoever.  Labs are reviewed  Clinical Impression:   Diagnoses of Abdominal pain, Abdominal pain, and Abdominal pain were pertinent to this visit.   1.  Recurrent small bowel obstruction 2.  Right-sided pneumothorax persistent 3.  History of COPD    Disposition:   Pending further workup    Patient will be admitted to general surgery, discussed with Dr. Damion Martins who knows the patient and his previously admitted him except see admission                   Jabier Deleon MD  12/17/17 0695

## 2017-12-17 NOTE — ED NOTES
APPEARANCE: Alert, oriented and distressed  CARDIAC: Normal rate and rhythm, no murmur heard.   PERIPHERAL VASCULAR: peripheral pulses present. Normal cap refill. No edema. Warm to touch.    MUSC: Full ROM. No bony tenderness or soft tissue tenderness. No obvious deformity.  SKIN: Skin is warm and dry, normal skin turgor, mucous membranes moist.  NEURO: 5/5 strength major flexors/extensors bilaterally. Sensory intact to light touch bilaterally. Jefferson coma scale: eyes open spontaneously-4, oriented & converses-5, obeys commands-6. No neurological abnormalities.   MENTAL STATUS: awake, alert and aware of environment.

## 2017-12-18 NOTE — SUBJECTIVE & OBJECTIVE
Interval History: NG tube with minimal output.  Abdominal pain, nausea, vomiting resolved.  Now putting out >1 liter ileostomy output.  Feels significantly less distended as well.  Afebrile; HD stable; room air.    Medications:  Continuous Infusions:   dextrose 5 % and 0.9 % NaCl 100 mL/hr at 12/17/17 1608     Scheduled Meds:   heparin (porcine)  5,000 Units Subcutaneous Q8H    pantoprazole  40 mg Intravenous Daily     PRN Meds:ondansetron     Review of patient's allergies indicates:  No Known Allergies  Objective:     Vital Signs (Most Recent):  Temp: 97.1 °F (36.2 °C) (12/18/17 1159)  Pulse: 70 (12/18/17 1159)  Resp: 18 (12/18/17 1159)  BP: (!) 94/52 (12/18/17 1159)  SpO2: 97 % (12/18/17 1158) Vital Signs (24h Range):  Temp:  [97.1 °F (36.2 °C)-98.3 °F (36.8 °C)] 97.1 °F (36.2 °C)  Pulse:  [60-89] 70  Resp:  [18-19] 18  SpO2:  [94 %-98 %] 97 %  BP: ()/(52-72) 94/52     Weight: 37.8 kg (83 lb 5.3 oz)  Body mass index is 13.05 kg/m².    Intake/Output - Last 3 Shifts       12/16 0700 - 12/17 0659 12/17 0700 - 12/18 0659 12/18 0700 - 12/19 0659    I.V. (mL/kg)  800 (21.2)     NG/GT  50     Total Intake(mL/kg)  850 (22.5)     Urine (mL/kg/hr)  50 (0.1)     Emesis/NG output  200 (0.2)     Stool  1110 (1.2)     Total Output   1360      Net   -510                   Physical Exam   Constitutional: He is oriented to person, place, and time. He appears cachectic. No distress.   HENT:   Head: Normocephalic and atraumatic.   Eyes: EOM are normal. No scleral icterus.   Neck: Normal range of motion.   Cardiovascular: Normal rate and regular rhythm.    Pulmonary/Chest: Effort normal. No respiratory distress.   No audible breath sounds on the right; right-sided chest tube in place with Heimlich valve for known chronic right pneumothorax   Abdominal: Soft. He exhibits no distension. There is no tenderness. There is no rebound and no guarding.   Ileostomy with gas and stool in RLQ   Musculoskeletal: Normal range of motion.  He exhibits no edema.   Neurological: He is alert and oriented to person, place, and time.   Nursing note and vitals reviewed.      Significant Labs:  CBC:   Recent Labs  Lab 12/18/17 0417   WBC 10.13   RBC 3.28*   HGB 10.3*   HCT 31.1*      MCV 95   MCH 31.4*   MCHC 33.1     CMP:   Recent Labs  Lab 12/17/17  0540 12/18/17 0417   * 93   CALCIUM 9.9 8.1*   ALBUMIN 3.7  --    PROT 8.3  --    * 139   K 5.4* 3.9   CO2 17* 24    109   BUN 26* 18   CREATININE 1.2 0.7   ALKPHOS 92  --    ALT 18  --    AST 31  --    BILITOT 0.5  --        Significant Diagnostics:  All pertinent imaging within the last 24 hours has been reviewed

## 2017-12-18 NOTE — PROGRESS NOTES
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 Presented to the hospital this morning with recurrent symptoms of adhesive bowel obstruction. He has had no ostomy output since yesterday.  He reports diffuse abdominal pain with nv as well as ab distention. His symptoms improved somewhat with NG tube placement in ED.  He was admitted to our service 5 days ago with the same problem and resolved quickly with conservative management.     Post-Op Info:  * No surgery found *         Interval History: NG tube with minimal output.  Abdominal pain, nausea, vomiting resolved.  Now putting out >1 liter ileostomy output.  Feels significantly less distended as well.  Afebrile; HD stable; room air.    Medications:  Continuous Infusions:   dextrose 5 % and 0.9 % NaCl 100 mL/hr at 12/17/17 1608     Scheduled Meds:   heparin (porcine)  5,000 Units Subcutaneous Q8H    pantoprazole  40 mg Intravenous Daily     PRN Meds:ondansetron     Review of patient's allergies indicates:  No Known Allergies  Objective:     Vital Signs (Most Recent):  Temp: 97.1 °F (36.2 °C) (12/18/17 1159)  Pulse: 70 (12/18/17 1159)  Resp: 18 (12/18/17 1159)  BP: (!) 94/52 (12/18/17 1159)  SpO2: 97 % (12/18/17 1158) Vital Signs (24h Range):  Temp:  [97.1 °F (36.2 °C)-98.3 °F (36.8 °C)] 97.1 °F (36.2 °C)  Pulse:  [60-89] 70  Resp:  [18-19] 18  SpO2:  [94 %-98 %] 97 %  BP: ()/(52-72) 94/52     Weight: 37.8 kg (83 lb 5.3 oz)  Body mass index is 13.05 kg/m².    Intake/Output - Last 3 Shifts       12/16 0700 - 12/17 0659 12/17 0700 - 12/18 0659 12/18 0700 - 12/19 0659    I.V. (mL/kg)  800 (21.2)     NG/GT  50     Total Intake(mL/kg)  850 (22.5)     Urine (mL/kg/hr)  50 (0.1)     Emesis/NG output  200 (0.2)     Stool  1110 (1.2)     Total Output   1360      Net   -510                   Physical Exam    Constitutional: He is oriented to person, place, and time. He appears cachectic. No distress.   HENT:   Head: Normocephalic and atraumatic.   Eyes: EOM are normal. No scleral icterus.   Neck: Normal range of motion.   Cardiovascular: Normal rate and regular rhythm.    Pulmonary/Chest: Effort normal. No respiratory distress.   No audible breath sounds on the right; right-sided chest tube in place with Heimlich valve for known chronic right pneumothorax   Abdominal: Soft. He exhibits no distension. There is no tenderness. There is no rebound and no guarding.   Ileostomy with gas and stool in RLQ   Musculoskeletal: Normal range of motion. He exhibits no edema.   Neurological: He is alert and oriented to person, place, and time.   Nursing note and vitals reviewed.      Significant Labs:  CBC:   Recent Labs  Lab 12/18/17 0417   WBC 10.13   RBC 3.28*   HGB 10.3*   HCT 31.1*      MCV 95   MCH 31.4*   MCHC 33.1     CMP:   Recent Labs  Lab 12/17/17  0540 12/18/17 0417   * 93   CALCIUM 9.9 8.1*   ALBUMIN 3.7  --    PROT 8.3  --    * 139   K 5.4* 3.9   CO2 17* 24    109   BUN 26* 18   CREATININE 1.2 0.7   ALKPHOS 92  --    ALT 18  --    AST 31  --    BILITOT 0.5  --        Significant Diagnostics:  All pertinent imaging within the last 24 hours has been reviewed    Assessment/Plan:     Complete intestinal obstruction    -Resolving with stool output in ileostomy  -This is patient has now been admitted 3 times for obstruction in the past 2 weeks  -Upper GI with small bowel follow through today to attempt to localize area of obstruction  -Will check nutrition labs in AM; possible hyperalimentation if poor  -Will likely need surgery to prevent future admissions; however, will need to optimize nutrition  -NPO  -IVFs  -DVT/GI ppx        Pneumothorax    -Known right pneumothorax  -Chest tube in place with Heimlich valve in place  -tolerating room air and in no respiratory distress            Ari MCGREGOR  Harsh Orozco MD  General Surgery  [facility]

## 2017-12-18 NOTE — ASSESSMENT & PLAN NOTE
-Resolving with stool output in ileostomy  -This is patient has now been admitted 3 times for obstruction in the past 2 weeks  -Upper GI with small bowel follow through today to attempt to localize area of obstruction  -Will check nutrition labs in AM; possible hyperalimentation if poor  -Will likely need surgery to prevent future admissions; however, will need to optimize nutrition  -NPO  -IVFs  -DVT/GI ppx

## 2017-12-18 NOTE — ASSESSMENT & PLAN NOTE
-Known right pneumothorax  -Chest tube in place with Heimlich valve in place  -tolerating room air and in no respiratory distress

## 2017-12-18 NOTE — PROGRESS NOTES
.Pharmacy New Medication Education    Patient accepted medication education.    Pharmacy educated patient on name and purpose of medications and possible side effects, using the teach-back   D/C Current Inpatient Medication Orders   D/C dextrose 5 % and 0.9 % NaCl infusion   D/C heparin (porcine) injection 5,000 Units   D/C ondansetron injection 4 mg   D/C pantoprazole injection 40 mg       Learners of pharmacy medication education included:  Patient    Patient +/- learner response:  Verbalized Understanding, Teachback

## 2017-12-18 NOTE — HPI
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 Presented to the hospital this morning with recurrent symptoms of adhesive bowel obstruction. He has had no ostomy output since yesterday.  He reports diffuse abdominal pain with nv as well as ab distention. His symptoms improved somewhat with NG tube placement in ED.  He was admitted to our service 5 days ago with the same problem and resolved quickly with conservative management.

## 2017-12-18 NOTE — PLAN OF CARE
TN met with pt   also spoke per phone with daughter Laura Yuen  [phone]   pt lives with daughter and her brother Preston     TN asked Ms. Sanchez if she would be interested in getting home health for her father --   daughter emphatically states she doesn't want HH for pt - states pt is never unattended and her Daughter in law is a nurse.       TN spoke with STEWART SANTAMARIA.  she had had advised TN that pt could get HH if family consented --   in addition, she will have [facility] SW visit pt to see if any other assistance is available to help with cost of supplies      pt has a Heimlich Valve;  has an ostomy - supplies by [facility];  pt has a st cane, sc, grab bars in shower    admit dx:  SBO       12/18/17 1706   Discharge Assessment   Assessment Type Discharge Planning Assessment   Confirmed/corrected address and phone number on facesheet? Yes   Assessment information obtained from? Patient;Caregiver;Medical Record   Expected Length of Stay (days) 3   Communicated expected length of stay with patient/caregiver yes   Prior to hospitilization cognitive status: Alert/Oriented   Prior to hospitalization functional status: Independent   Current cognitive status: Alert/Oriented   Current Functional Status: Independent   Lives With child(inna), adult   Able to Return to Prior Arrangements yes   Is patient able to care for self after discharge? Unable to determine at this time (comments)   Patient's perception of discharge disposition home or selfcare   Readmission Within The Last 30 Days (12/11-12/17 - SBO;  12/08-10/17 )   Patient currently being followed by outpatient case management? No   Patient currently receives any other outside agency services? No   Equipment Currently Used at Home cane, straight;shower chair  (ostomy supplies per [facility] ; has grab bars in shower )   Do you have any problems affording any of your prescribed medications? Yes  ([facility] [place] -- pt able to get meds - daughter assist with purchase  - pt states med/supplies are expensive for him  )   Is the patient taking medications as prescribed? yes   Does the patient have transportation home? Yes   Transportation Available family or friend will provide   Does the patient receive services at the [facility]? No   Discharge Plan A Home;Home with family   Patient/Family In Agreement With Plan yes

## 2017-12-18 NOTE — PROGRESS NOTES
Gave a total of 2 bottles of gastrograffin thru NG tube. Pt's ileostomy put out 400cc of watery green stool. Juliet with Radiology notified about finishing giving gatrograffin.

## 2017-12-18 NOTE — PROGRESS NOTES
Dr. Ugarte notified that pt passing gas  And liquid stool. Orders to clan Ng tube and pt can have ice chips.

## 2017-12-19 PROBLEM — K56.51 INTESTINAL ADHESIONS WITH PARTIAL OBSTRUCTION: Status: ACTIVE | Noted: 2017-01-01

## 2017-12-19 NOTE — PLAN OF CARE
Problem: Patient Care Overview  Goal: Plan of Care Review  No nausea this shift  Maintaining npo status   Dr ford notified of large effluent volume   No complaints of abd pain at shift end   Telemetry maintained showing predominantly sinus rhythm  Iv fluids cont at ordered rate  Dressing material surrounding distal end of connection tubing of chest tube site changed twice due to soiling from ileostomy effluent escaped from appliance   Pt stated he was hungry at shift end

## 2017-12-19 NOTE — PROGRESS NOTES
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 Presented to the hospital this morning with recurrent symptoms of adhesive bowel obstruction. He has had no ostomy output since yesterday.  He reports diffuse abdominal pain with nv as well as ab distention. His symptoms improved somewhat with NG tube placement in ED.  He was admitted to our service 5 days ago with the same problem and resolved quickly with conservative management.     Post-Op Info:  * No surgery found *         Interval History: NG tube clamped.  No N/V or abdominal pain.  >2L ileostomy out after Upper GI, which showed slow transit but no focal site of obstruction.  Afebrile; HD stable; room air.    Medications:  Continuous Infusions:   dextrose 5 % and 0.9 % NaCl 100 mL/hr at 12/19/17 0340     Scheduled Meds:   heparin (porcine)  5,000 Units Subcutaneous Q8H    metoclopramide HCl  10 mg Oral TID AC    pantoprazole  40 mg Intravenous Daily     PRN Meds:ondansetron     Review of patient's allergies indicates:  No Known Allergies  Objective:     Vital Signs (Most Recent):  Temp: 99.3 °F (37.4 °C) (12/19/17 1204)  Pulse: 63 (12/19/17 1204)  Resp: 18 (12/19/17 1204)  BP: 120/63 (12/19/17 1204)  SpO2: 95 % (12/19/17 0745) Vital Signs (24h Range):  Temp:  [97 °F (36.1 °C)-99.3 °F (37.4 °C)] 99.3 °F (37.4 °C)  Pulse:  [63-87] 63  Resp:  [18] 18  SpO2:  [95 %-98 %] 95 %  BP: (108-127)/(60-70) 120/63     Weight: 37.8 kg (83 lb 5.3 oz)  Body mass index is 13.05 kg/m².    Intake/Output - Last 3 Shifts       12/17 0700 - 12/18 0659 12/18 0700 - 12/19 0659 12/19 0700 - 12/20 0659    I.V. (mL/kg) 800 (21.2) 2341.7 (61.9)     NG/GT 50      Total Intake(mL/kg) 850 (22.5) 2341.7 (61.9)     Urine (mL/kg/hr) 50 (0.1) 525 (0.6)     Emesis/NG output 200 (0.2)      Stool 1110 (1.2) 2850 (3.1)     Total  Output 1360 3375      Net -510 -1033.3             Stool Occurrence  0 x           Physical Exam   Constitutional: He is oriented to person, place, and time. He appears cachectic. No distress.   HENT:   Head: Normocephalic and atraumatic.   Eyes: EOM are normal. No scleral icterus.   Neck: Normal range of motion.   Cardiovascular: Normal rate and regular rhythm.    Pulmonary/Chest: Effort normal. No respiratory distress.   No audible breath sounds on the right; right-sided chest tube in place with Heimlich valve for known chronic right pneumothorax   Abdominal: Soft. He exhibits no distension. There is no tenderness. There is no rebound and no guarding.   Ileostomy with gas and stool in RLQ   Musculoskeletal: Normal range of motion. He exhibits no edema.   Neurological: He is alert and oriented to person, place, and time.   Nursing note and vitals reviewed.      Significant Labs:  CBC:     Recent Labs  Lab 12/19/17  0504   WBC 9.19   RBC 3.56*   HGB 11.1*   HCT 33.9*      MCV 95   MCH 31.2*   MCHC 32.7     CMP:     Recent Labs  Lab 12/19/17  0504   *   CALCIUM 8.7   ALBUMIN 2.8*   PROT 6.1      K 3.7   CO2 24   *   BUN 13   CREATININE 0.8   ALKPHOS 71   ALT 15   AST 19   BILITOT 0.3       Significant Diagnostics:  All pertinent imaging within the last 24 hours has been reviewed    Assessment/Plan:     Complete intestinal obstruction    -Resolving with stool output in ileostomy  -This is patient has now been admitted 3 times for obstruction in the past 2 weeks  -Upper GI with small bowel follow through unremarkable except for slow transit.  -Start Reglan  -Prealbumin 11; recommended TPN, even if able to resume diet; patient does not want at this time  -Will likely need surgery to prevent future admissions; however, will need to optimize nutrition prior to this; however, patient currently declining PICC/TPN  -Discontinue Ng tube; advance diet as tolerated to dental soft with  supplementation  -DVT/GI ppx        Pneumothorax    -Known right pneumothorax  -Chest tube in place with Heimlich valve in place  -tolerating room air and in no respiratory distress            Ari House Jr., MD  General Surgery  [facility]

## 2017-12-19 NOTE — NURSING
Urostomy appliance with attached de jesus drain bag system applied to ileostomy stoma following loss of temporary appliance   Ostomy output estimated at 600 ml volume with latest loss of seal    Urostomy system chosen for management of very watery effluent at this time   [place] stomal skin without any evidence of breakdown  Stoma pink , moist

## 2017-12-19 NOTE — NURSING
Report received from [facility]    Large volume of effluent present in ostomy bag   Follow up series of xrays in progress   Ng tube to left nare present  Chest tube patent to right chest heimlich one way flutter valve device   Daughter at bedside

## 2017-12-19 NOTE — SUBJECTIVE & OBJECTIVE
Interval History: NG tube clamped.  No N/V or abdominal pain.  >2L ileostomy out after Upper GI, which showed slow transit but no focal site of obstruction.  Afebrile; HD stable; room air.    Medications:  Continuous Infusions:   dextrose 5 % and 0.9 % NaCl 100 mL/hr at 12/19/17 0340     Scheduled Meds:   heparin (porcine)  5,000 Units Subcutaneous Q8H    metoclopramide HCl  10 mg Oral TID AC    pantoprazole  40 mg Intravenous Daily     PRN Meds:ondansetron     Review of patient's allergies indicates:  No Known Allergies  Objective:     Vital Signs (Most Recent):  Temp: 99.3 °F (37.4 °C) (12/19/17 1204)  Pulse: 63 (12/19/17 1204)  Resp: 18 (12/19/17 1204)  BP: 120/63 (12/19/17 1204)  SpO2: 95 % (12/19/17 0745) Vital Signs (24h Range):  Temp:  [97 °F (36.1 °C)-99.3 °F (37.4 °C)] 99.3 °F (37.4 °C)  Pulse:  [63-87] 63  Resp:  [18] 18  SpO2:  [95 %-98 %] 95 %  BP: (108-127)/(60-70) 120/63     Weight: 37.8 kg (83 lb 5.3 oz)  Body mass index is 13.05 kg/m².    Intake/Output - Last 3 Shifts       12/17 0700 - 12/18 0659 12/18 0700 - 12/19 0659 12/19 0700 - 12/20 0659    I.V. (mL/kg) 800 (21.2) 2341.7 (61.9)     NG/GT 50      Total Intake(mL/kg) 850 (22.5) 2341.7 (61.9)     Urine (mL/kg/hr) 50 (0.1) 525 (0.6)     Emesis/NG output 200 (0.2)      Stool 1110 (1.2) 2850 (3.1)     Total Output 1360 3375      Net -510 -1033.3             Stool Occurrence  0 x           Physical Exam   Constitutional: He is oriented to person, place, and time. He appears cachectic. No distress.   HENT:   Head: Normocephalic and atraumatic.   Eyes: EOM are normal. No scleral icterus.   Neck: Normal range of motion.   Cardiovascular: Normal rate and regular rhythm.    Pulmonary/Chest: Effort normal. No respiratory distress.   No audible breath sounds on the right; right-sided chest tube in place with Heimlich valve for known chronic right pneumothorax   Abdominal: Soft. He exhibits no distension. There is no tenderness. There is no rebound and  no guarding.   Ileostomy with gas and stool in RLQ   Musculoskeletal: Normal range of motion. He exhibits no edema.   Neurological: He is alert and oriented to person, place, and time.   Nursing note and vitals reviewed.      Significant Labs:  CBC:     Recent Labs  Lab 12/19/17  0504   WBC 9.19   RBC 3.56*   HGB 11.1*   HCT 33.9*      MCV 95   MCH 31.2*   MCHC 32.7     CMP:     Recent Labs  Lab 12/19/17  0504   *   CALCIUM 8.7   ALBUMIN 2.8*   PROT 6.1      K 3.7   CO2 24   *   BUN 13   CREATININE 0.8   ALKPHOS 71   ALT 15   AST 19   BILITOT 0.3       Significant Diagnostics:  All pertinent imaging within the last 24 hours has been reviewed

## 2017-12-19 NOTE — PROGRESS NOTES
.Pharmacy New Medication Education    Patient accepted medication education.    Pharmacy educated patient on name and purpose of medications and possible side effects, using the teach-back method.     reglan    Learners of pharmacy medication education included:  Patient    Patient +/- learner response:  Verbalized Understanding, Teachback

## 2017-12-19 NOTE — PLAN OF CARE
Problem: Patient Care Overview  Goal: Plan of Care Review  Outcome: Ongoing (interventions implemented as appropriate)  Pt is AAOx3. No complaints of nausea, vomiting, or diarrhea. No complaints of pain. NPO. Ileostomy is draining frequently to the de jesus bag. Chest tube to the rcw is clamped. NG tube is clamped. Tele 8544 and running sinus rhythm. Safety precautions maintained. Tolerated all medications well.

## 2017-12-19 NOTE — ASSESSMENT & PLAN NOTE
-Resolving with stool output in ileostomy  -This is patient has now been admitted 3 times for obstruction in the past 2 weeks  -Upper GI with small bowel follow through unremarkable except for slow transit.  -Start Reglan  -Prealbumin 11; recommended TPN, even if able to resume diet; patient does not want at this time  -Will likely need surgery to prevent future admissions; however, will need to optimize nutrition prior to this; however, patient currently declining PICC/TPN  -Discontinue Ng tube; advance diet as tolerated to dental soft with supplementation  -DVT/GI ppx

## 2017-12-19 NOTE — NURSING
Patency of ileostomy appliance compromised   Undetermined volume of effluent loss on bed   Appliance resecured with tape temporarit  to peristoma area until can reapply new appliance

## 2017-12-20 NOTE — PHYSICIAN QUERY
PT Name: Moises Hernandez  MR #: [id number]    Physician Query Form - Nutrition Clarification     CDS: Sachi Drake RN     Contact information:  [email or web address]    Phone: [phone]    This form is a permanent document in the medical record.     Query Date: December 20, 2017    By submitting this query, we are merely seeking further clarification of documentation.. Please utilize your independent clinical judgment when addressing the question(s) below.    The Medical record contains the following:   Indicators  Supporting Clinical Findings Location in Medical Record    % of Estimated Energy Intake over a time frame from p.o., TF, or TPN      Weight Status over a time frame      X Subcutaneous Fat and/or Muscle Loss He appears cachectic  PN 12/18/17    Fluid Accumulation or Edema      Reduced  Strength      X Wt / BMI / Usual Body Weight  Wt= 41.3 kg (91 lb)  BMI= 14.3 Anthropometrics flow sheet     X Delayed Wound Healing / Failure to Thrive patient currently declining   PN 12/19/17    X Acute or Chronic Illness recurrent adhesive bowel obstruction  H&P 12/17/17    Medication      X Treatment Prealbumin 11; recommended TPN, even if able to resume diet;patient does not want at this time    Boost Plus Supplement Any flavor All meals  PN 12/19/17         Dietary order 12/19/17    Other       AND / ASPEN Clinical Characteristics (October 2011)  A minimum of two characteristics is recommended for diagnosing either moderate or severe malnutrition   Mild Malnutrition Moderate Malnutrition Severe Malnutrition   Energy Intake from p.o., TF or TPN. < 75% intake of estimated energy needs for less than 7 days < 75% intake of estimated energy needs for greater than 7 days < 50% intake of estimated energy needs for > 5 days   Weight Loss 1-2% in 1 month  5% in 3 months  7.5% in 6 months  10% in 1 year 1-2 % in 1 week  5% in 1 month  7.5% in 3 months  10% in 6 months  20% in 1 year > 2% in 1 week  > 5% in 1  month  > 7.5% in 3 months  > 10% in 6 months  > 20% in 1 year   Physical Findings     None *Mild subcutaneous fat and/or muscle loss  *Mild fluid accumulation  *Stage II decubitus  *Surgical wound or non-healing wound *Mod/severe subcutaneous fat and/or muscle loss  *Mod/severe fluid accumulation  *Stage III or IV decubitus  *Non-healing surgical wound     Provider, please specify diagnosis or diagnoses associated with above clinical findings.    [ ] Mild Protein-Calorie Malnutrition  [ ] Moderate Protein-Calorie Malnutrition  [x ] Severe Protein-Calorie Malnutrition  [ ] Cachexia  [ ] Anorexia  [ ] Abnormal Weight Loss  [ ] Underweight  [ ] Other Nutritional Diagnosis (please specify): ____________________________________  [ ] Other: ________________________________  [ ] Clinically Undetermined    Please document in your progress notes daily for the duration of treatment until resolved and include in your discharge summary.

## 2017-12-20 NOTE — HOSPITAL COURSE
Patient admitted for SBO.  NG tube was placed with high, dark brown, thick output.  Overnight, ostomy began functioning again.  His tube was removed and his diet advanced slowly.  Long discussions were had with the patient and his daughter about TPN to improve nutrition as this is now the 3rd SBO he has had since the beginning of the month.  He will likely need an operation and lysis of adhesions; and his nutritional status is in jeopardy with his recurring SBOs and low appetite at baseline.  He desires to attempt one more time to be home and eat by mouth only.  He is in agreement that if another obstruction occurs, he will be placed on TPN to improve his nutrition prior to an operation (barring that operation needing to be emergent).  He understands this.  He was discharged on 12/20/2017 in stable condition.  Also addressed this admission was a chronic pneumothorax, for which he has an indwelling chest tube with Heimlich valve.  He will follow up with pulmonary for chest tube management.  May be able to perform clamping trial and subsequent removal of chest tube.

## 2017-12-20 NOTE — ASSESSMENT & PLAN NOTE
-Resolving with stool output in ileostomy  -This is patient has now been admitted 3 times for obstruction in the past 2 weeks  -Upper GI with small bowel follow through unremarkable except for slow transit.  -Reglan  -Prealbumin 11; recommended TPN, even if able to resume diet; patient does not want at this time  -Will likely need surgery to prevent future admissions; however, will need to optimize nutrition prior to this; however, patient currently declining PICC/TPN and wants to try oral intake as sole source of calories  -Dental soft diet  -DVT/GI ppx  -Discharge planning: patient unwilling to start TPN at this time despite multiple discussions on the topic.

## 2017-12-20 NOTE — PROGRESS NOTES
.Pharmacy New Medication Education    Patient accepted medication education.    Pharmacy educated patient on name and purpose of medications and possible side effects, using the teach-back method.     morphine    Learners of pharmacy medication education included:  Patient    Patient +/- learner response:  Verbalized Understanding, Teachback

## 2017-12-20 NOTE — PLAN OF CARE
Problem: Patient Care Overview  Goal: Plan of Care Review  Outcome: Ongoing (interventions implemented as appropriate)  Pt is AAOx3. No complaints of nausea, vomiting, or diarrhea. Pt abdomen distended and and complained of pain and given morphine. Light gi soft diet and slowly is progressing with a diet. Tele 8544 and sinus rhythm. Ileostomy bag changed and draining well. Safety precautions maintained. Tolerated all medications well.

## 2017-12-20 NOTE — DISCHARGE SUMMARY
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 Presented to the hospital this morning with recurrent symptoms of adhesive bowel obstruction. He has had no ostomy output since yesterday.  He reports diffuse abdominal pain with nv as well as ab distention. His symptoms improved somewhat with NG tube placement in ED.  He was admitted to our service 5 days ago with the same problem and resolved quickly with conservative management.     * No surgery found *      Indwelling Lines/Drains at time of discharge:   Lines/Drains/Airways     Drain                 Ileostomy 01/02/16  days         Chest Tube 12/08/17 1153 1 Right 10 Fr. 12 days              Hospital Course: Patient admitted for SBO.  NG tube was placed with high, dark brown, thick output.  Overnight, ostomy began functioning again.  His tube was removed and his diet advanced slowly.  Long discussions were had with the patient and his daughter about TPN to improve nutrition as this is now the 3rd SBO he has had since the beginning of the month.  He will likely need an operation and lysis of adhesions; and his nutritional status is in jeopardy with his recurring SBOs and low appetite at baseline.  He desires to attempt one more time to be home and eat by mouth only.  He is in agreement that if another obstruction occurs, he will be placed on TPN to improve his nutrition prior to an operation (barring that operation needing to be emergent).  He understands this.  He was discharged on 12/20/2017 in stable condition.  He will start taking  Reglan for slow transit of contrast on Small Bowel follow through imaging.  Also addressed this admission was a chronic pneumothorax, for which he has an indwelling chest tube with Heimlich valve.  He will follow up with pulmonary for chest tube management.  May be able to perform clamping trial and subsequent removal of chest tube.    Consults:   Consults         Status Ordering Provider     Inpatient consult to PICC team (LEONA)  Once     Provider:  (Not yet assigned)    Acknowledged DIANE SUGGS JR.          Significant Diagnostic Studies: Labs:   CMP   Recent Labs  Lab 12/19/17  0504 12/20/17  0532    143   K 3.7 3.8   * 115*   CO2 24 21*   * 109   BUN 13 13   CREATININE 0.8 0.8   CALCIUM 8.7 8.5*   PROT 6.1  --    ALBUMIN 2.8*  --    BILITOT 0.3  --    ALKPHOS 71  --    AST 19  --    ALT 15  --    ANIONGAP 6* 7*   ESTGFRAFRICA >60 >60   EGFRNONAA >60 >60    and CBC   Recent Labs  Lab 12/19/17  0504 12/20/17  0532   WBC 9.19 12.18   HGB 11.1* 10.8*   HCT 33.9* 32.9*    358*     Radiology: see chart    Pending Diagnostic Studies:     Procedure Component Value Units Date/Time    X-Ray Abdomen Flat And Erect [[id number]] Resulted:  12/17/17 0619    Order Status:  Sent Lab Status:  In process Updated:  12/17/17 0620    X-Ray Chest AP Portable [[id number]] Resulted:  12/17/17 0619    Order Status:  Sent Lab Status:  In process Updated:  12/17/17 0619        Final Active Diagnoses:    Diagnosis Date Noted POA    PRINCIPAL PROBLEM:  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 Go on 12/29/2017.    Specialties:  Surgery, General Surgery  Why:  follow up  Contact information:  [address]  [address]  [place] [id number] [phone]                 Patient Instructions:     Diet general     Activity as tolerated     Call MD for:  temperature >100.4     Call MD for:  severe uncontrolled pain     Call MD for:  persistent nausea and vomiting or diarrhea     Call MD for:  increased confusion or weakness     Call MD for:  persistent dizziness, light-headedness, or visual disturbances     Other restrictions (specify):       Medications:  Reconciled Home Medications:   Current Discharge Medication List      START taking these medications    Details   metoclopramide HCl (REGLAN) 10 MG tablet Take 1 tablet (10 mg total) by mouth 3 (three) times daily before meals.  Qty: 90 tablet, Refills: 1         CONTINUE these medications which have NOT CHANGED    Details   ondansetron (ZOFRAN-ODT) 4 MG TbDL Take 2 tablets (8 mg total) by mouth every 8 (eight) hours as needed.  Qty: 20 tablet, Refills: 0      pantoprazole (PROTONIX) 20 MG tablet Take 20 mg by mouth once daily.      PROAIR HFA 90 mcg/actuation inhaler every 4 (four) hours as needed.          STOP taking these medications       loperamide (IMODIUM) 2 mg capsule Comments:   Reason for Stopping:         simethicone (MYLICON) 80 MG chewable tablet Comments:   Reason for Stopping:             Time spent on the discharge of patient: 10 minutes    Ari House Jr., MD  General Surgery  [facility]

## 2017-12-20 NOTE — SUBJECTIVE & OBJECTIVE
Interval History: NG tube removed.  Tolerating soft diet.  No N/V.  He did have a brief bout of mild distention and mild abdominal pain but this quickly resolved when he put out some more stool.  1.2L ileostomy output.  Afebrile; HD stable; room air.    Medications:  Continuous Infusions:   dextrose 5 % and 0.9 % NaCl 100 mL/hr at 12/19/17 2250     Scheduled Meds:   heparin (porcine)  5,000 Units Subcutaneous Q8H    metoclopramide HCl  10 mg Oral TID AC    pantoprazole  40 mg Intravenous Daily     PRN Meds:morphine, ondansetron     Review of patient's allergies indicates:  No Known Allergies  Objective:     Vital Signs (Most Recent):  Temp: 97.9 °F (36.6 °C) (12/20/17 0811)  Pulse: 67 (12/20/17 0811)  Resp: 18 (12/20/17 0811)  BP: 128/67 (12/20/17 0811)  SpO2: 98 % (12/20/17 0521) Vital Signs (24h Range):  Temp:  [97.9 °F (36.6 °C)-99.3 °F (37.4 °C)] 97.9 °F (36.6 °C)  Pulse:  [63-86] 67  Resp:  [18-19] 18  SpO2:  [96 %-98 %] 98 %  BP: (105-159)/(55-76) 128/67     Weight: 40.8 kg (89 lb 15.2 oz)  Body mass index is 14.09 kg/m².    Intake/Output - Last 3 Shifts       12/18 0700 - 12/19 0659 12/19 0700 - 12/20 0659 12/20 0700 - 12/21 0659    P.O.  785     I.V. (mL/kg) 2341.7 (61.9) 1200 (29.4)     NG/GT       Total Intake(mL/kg) 2341.7 (61.9) 1985 (48.7)     Urine (mL/kg/hr) 525 (0.6) 650 (0.7)     Emesis/NG output       Stool 2850 (3.1) 1200 (1.2)     Total Output 3375 1850      Net -1033.3 +135             Stool Occurrence 0 x 0 x           Physical Exam   Constitutional: He is oriented to person, place, and time. He appears cachectic. No distress.   HENT:   Head: Normocephalic and atraumatic.   Eyes: EOM are normal. No scleral icterus.   Neck: Normal range of motion.   Cardiovascular: Normal rate and regular rhythm.    Pulmonary/Chest: Effort normal. No respiratory distress.   No audible breath sounds on the right; right-sided chest tube in place with Heimlich valve for known chronic right pneumothorax    Abdominal: Soft. He exhibits no distension. There is no tenderness. There is no rebound and no guarding.   Ileostomy with gas and stool in RLQ   Musculoskeletal: Normal range of motion. He exhibits no edema.   Neurological: He is alert and oriented to person, place, and time.   Nursing note and vitals reviewed.      Significant Labs:  CBC:     Recent Labs  Lab 12/20/17  0532   WBC 12.18   RBC 3.45*   HGB 10.8*   HCT 32.9*   *   MCV 95   MCH 31.3*   MCHC 32.8     CMP:     Recent Labs  Lab 12/19/17  0504 12/20/17  0532   * 109   CALCIUM 8.7 8.5*   ALBUMIN 2.8*  --    PROT 6.1  --     143   K 3.7 3.8   CO2 24 21*   * 115*   BUN 13 13   CREATININE 0.8 0.8   ALKPHOS 71  --    ALT 15  --    AST 19  --    BILITOT 0.3  --        Significant Diagnostics:  All pertinent imaging within the last 24 hours has been reviewed

## 2017-12-20 NOTE — PROGRESS NOTES
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 Presented to the hospital this morning with recurrent symptoms of adhesive bowel obstruction. He has had no ostomy output since yesterday.  He reports diffuse abdominal pain with nv as well as ab distention. His symptoms improved somewhat with NG tube placement in ED.  He was admitted to our service 5 days ago with the same problem and resolved quickly with conservative management.     Post-Op Info:  * No surgery found *         Interval History: NG tube removed.  Tolerating soft diet.  No N/V.  He did have a brief bout of mild distention and mild abdominal pain but this quickly resolved when he put out some more stool.  1.2L ileostomy output.  Afebrile; HD stable; room air.    Medications:  Continuous Infusions:   dextrose 5 % and 0.9 % NaCl 100 mL/hr at 12/19/17 2250     Scheduled Meds:   heparin (porcine)  5,000 Units Subcutaneous Q8H    metoclopramide HCl  10 mg Oral TID AC    pantoprazole  40 mg Intravenous Daily     PRN Meds:morphine, ondansetron     Review of patient's allergies indicates:  No Known Allergies  Objective:     Vital Signs (Most Recent):  Temp: 97.9 °F (36.6 °C) (12/20/17 0811)  Pulse: 67 (12/20/17 0811)  Resp: 18 (12/20/17 0811)  BP: 128/67 (12/20/17 0811)  SpO2: 98 % (12/20/17 0521) Vital Signs (24h Range):  Temp:  [97.9 °F (36.6 °C)-99.3 °F (37.4 °C)] 97.9 °F (36.6 °C)  Pulse:  [63-86] 67  Resp:  [18-19] 18  SpO2:  [96 %-98 %] 98 %  BP: (105-159)/(55-76) 128/67     Weight: 40.8 kg (89 lb 15.2 oz)  Body mass index is 14.09 kg/m².    Intake/Output - Last 3 Shifts       12/18 0700 - 12/19 0659 12/19 0700 - 12/20 0659 12/20 0700 - 12/21 0659    P.O.  785     I.V. (mL/kg) 2341.7 (61.9) 1200 (29.4)     NG/GT       Total Intake(mL/kg) 2341.7 (61.9) 1985 (48.7)     Urine (mL/kg/hr) 525  (0.6) 650 (0.7)     Emesis/NG output       Stool 2850 (3.1) 1200 (1.2)     Total Output 3375 1850      Net -1033.3 +135             Stool Occurrence 0 x 0 x           Physical Exam   Constitutional: He is oriented to person, place, and time. He appears cachectic. No distress.   HENT:   Head: Normocephalic and atraumatic.   Eyes: EOM are normal. No scleral icterus.   Neck: Normal range of motion.   Cardiovascular: Normal rate and regular rhythm.    Pulmonary/Chest: Effort normal. No respiratory distress.   No audible breath sounds on the right; right-sided chest tube in place with Heimlich valve for known chronic right pneumothorax   Abdominal: Soft. He exhibits no distension. There is no tenderness. There is no rebound and no guarding.   Ileostomy with gas and stool in RLQ   Musculoskeletal: Normal range of motion. He exhibits no edema.   Neurological: He is alert and oriented to person, place, and time.   Nursing note and vitals reviewed.      Significant Labs:  CBC:     Recent Labs  Lab 12/20/17  0532   WBC 12.18   RBC 3.45*   HGB 10.8*   HCT 32.9*   *   MCV 95   MCH 31.3*   MCHC 32.8     CMP:     Recent Labs  Lab 12/19/17  0504 12/20/17  0532   * 109   CALCIUM 8.7 8.5*   ALBUMIN 2.8*  --    PROT 6.1  --     143   K 3.7 3.8   CO2 24 21*   * 115*   BUN 13 13   CREATININE 0.8 0.8   ALKPHOS 71  --    ALT 15  --    AST 19  --    BILITOT 0.3  --        Significant Diagnostics:  All pertinent imaging within the last 24 hours has been reviewed    Assessment/Plan:     * Intestinal adhesions with partial obstruction    -Resolving with stool output in ileostomy  -This is patient has now been admitted 3 times for obstruction in the past 2 weeks  -Upper GI with small bowel follow through unremarkable except for slow transit.  -Reglan  -Prealbumin 11; recommended TPN, even if able to resume diet; patient does not want at this time  -Will likely need surgery to prevent future admissions; however, will  need to optimize nutrition prior to this; however, patient currently declining PICC/TPN and wants to try oral intake as sole source of calories  -Dental soft diet  -DVT/GI ppx  -Discharge planning: patient unwilling to start TPN at this time despite multiple discussions on the topic.        Pneumothorax    -Known right pneumothorax  -Chest tube in place with Heimlich valve in place  -tolerating room air and in no respiratory distress            Ari House Jr., MD  General Surgery  [facility]

## 2017-12-20 NOTE — PLAN OF CARE
TN met with pt prior to discharge   pt declined offer of HH;  daughter has declined as well.   pt states he has an upcoming apt with pcp - daughter has this info   Future Appointments  Date Time Provider Department Center   12/29/2017 1:00 PM Theodora Arciniega MD [facility]     no hh, no dme   PHN  to have  contact this pt.    wants assistance with paying for meds and ostomy supplies        12/20/17 9583   Final Note   Assessment Type Final Discharge Note   Discharge Disposition Home   What phone number can be called within the next 1-3 days to see how you are doing after discharge? [phone]   Hospital Follow Up  Appt(s) scheduled? Yes   Discharge plans and expectations educations in teach back method with documentation complete? Yes   Right Care Referral Info   Post Acute Recommendation SNF / Sub-Acute Rehab   Referral Type (no care -- daughter/pt declined home health )

## 2017-12-20 NOTE — PROGRESS NOTES
Follow-Up       Follow-up With  Details  Why  Contact Info   Theodora Arciniega MD  Go on 12/29/2017  1:00 pm follow up  [address]  [address]  [place] [id number] [phone]

## 2017-12-21 NOTE — PROGRESS NOTES
Pt given discharge instructions and prescriptions delivered to pt. Pt colostomy intact. IV's and telemetry discontinued. Pt getting dressed and awaiting transportation to be transported to home via daughter.

## 2017-12-24 NOTE — ED PROVIDER NOTES
Encounter Date: 12/24/2017    SCRIBE #1 NOTE: I, Sherice Watson, am scribing for, and in the presence of,  Dr. Stuart. I have scribed the entire note.       History     Chief Complaint   Patient presents with    hemilich valve came off     pt had chest tube hemilich valve came off today.     This is a 82 y.o. male with history of pneumothorax s/p chest tube placement earlier this month, ileostomy, who presents with complaint of chest tube dislodged. As per family, the incident occurred a few hours ago. The family notes the patient accidentally removed the tube when it caught on a sharp edge at home. The patient states he is currently without any pain. He denies any chest pain, shortness of breath, palpitations, cough, congestion, fever or chills. The patient states he actually feels better since the tube was removed. He is well-known to the ED and has had multiple recent admissions for SBO.      The history is provided by a relative and the patient.     Review of patient's allergies indicates:  No Known Allergies  Past Medical History:   Diagnosis Date    C. difficile diarrhea     COPD (chronic obstructive pulmonary disease)      Past Surgical History:   Procedure Laterality Date    ABDOMINAL SURGERY      EYE SURGERY      HERNIA REPAIR      ILEOSTOMY      SPLENECTOMY, TOTAL       Family History   Problem Relation Age of Onset    Cancer Mother      stomach    Heart attack Father      Social History   Substance Use Topics    Smoking status: Former Smoker     Packs/day: 1.00     Years: 60.00     Types: Cigarettes     Quit date: 2015    Smokeless tobacco: Never Used    Alcohol use Yes     Review of Systems   Constitutional: Negative for chills and fever.   HENT: Negative for congestion, rhinorrhea and sore throat.    Eyes: Negative for visual disturbance.   Respiratory: Negative for cough and shortness of breath.    Cardiovascular: Negative for chest pain.        Chest tube dislodgement   Gastrointestinal:  Negative for abdominal pain, constipation, diarrhea, nausea and vomiting.   Genitourinary: Negative for dysuria, flank pain, frequency, hematuria and urgency.   Musculoskeletal: Negative for back pain, neck pain and neck stiffness.   Skin: Negative for pallor, rash and wound.   Neurological: Negative for dizziness, weakness and headaches.   Hematological: Does not bruise/bleed easily.   Psychiatric/Behavioral: Negative for agitation, behavioral problems and confusion.       Physical Exam     Initial Vitals [12/24/17 1205]   BP Pulse Resp Temp SpO2   135/80 80 20 97.4 °F (36.3 °C) 98 %      MAP       98.33         Physical Exam    Nursing note and vitals reviewed.  Constitutional: He is not diaphoretic. No distress.   Thin, cachetic appearing   HENT:   Head: Normocephalic and atraumatic.   Right Ear: External ear normal.   Left Ear: External ear normal.   Eyes: Conjunctivae and EOM are normal. Pupils are equal, round, and reactive to light.   Neck: Normal range of motion. Neck supple.   Cardiovascular: Normal rate, regular rhythm and normal heart sounds. Exam reveals no gallop and no friction rub.    No murmur heard.  Pulmonary/Chest: Effort normal. No accessory muscle usage. No apnea. No respiratory distress. He has decreased breath sounds in the right upper field and the right middle field. He has no wheezes. He has no rhonchi. He has no rales.   Diminished breath sounds on right  Anterior R chest with opening from prior Pigtail catheter, no redness, swelling, or air exiting from opening    Abdominal: Soft. Bowel sounds are normal. There is no tenderness. There is no rebound and no guarding.   Ostomy in place, stoma is pink and well perfused   Musculoskeletal: Normal range of motion. He exhibits no edema or tenderness.   Neurological: He is alert and oriented to person, place, and time. He has normal strength.   Skin: Skin is warm and dry. No rash noted.         ED Course   Procedures  Labs Reviewed - No data to  display     Imaging Results          X-Ray Chest PA And Lateral (Final result)  Result time 12/24/17 13:21:31    Final result by Aurora Carney MD (12/24/17 13:21:31)                 Impression:      As above.      Electronically signed by: AURORA CARNEY MD  Date:     12/24/17  Time:    13:21              Narrative:    PA and lateral chest x-ray    Comparison: 12/17/17    Findings: There is a large right pneumothorax with complete collapse of the right lung, similar to prior study.  Right-sided chest tube has been removed.  No significant mediastinal shift.  Left lung is clear and hyperexpanded.  Surgical clips noted in the upper abdomen.                               Medical Decision Making:   Initial Assessment:   82 y.o male with history of COPD and chronic right pneumothorax, presents after accidentally catching chest tube on edge earlier today. Pigtail catheter has been completely removed. Pt without pain, shortness of breath or any concerns at this time. He actually states he feels better since tube has been out. Oxygen saturation is 98%. Will obtain chest xray to re-evaluate lungs and continue to monitor closely in ER.  Differential Diagnosis:   Differential Diagnosis includes, but is not limited to:  PE, MI/ACS, pneumothorax, pericardial effusion/tamonade, pneumonia, lung abscess, pericarditis/myocarditis, pleural effusion, lung mass, CHF exacerbation, asthma exacerbation, COPD exacerbation, aspirated/ingested foreign body, airway obstruction, CO poisoning, anemia, metabolic derangement, allergy/atopy, influenza, viral URI, viral syndrome.    Clinical Tests:   Radiological Study: Ordered and Reviewed  ED Management:  Xray reveals chronic pneumothorax without acute changes.    Patient without symptoms at this time. Has follow up appointment with surgery later this week. Patient is stable for discharge and continued with follow up appointment as scheduled.    Given strict return precautions including chest  pain, SOB, and patient/family will return to ED if these arise.  Upon re-evaluation, the patient's status has remained stable.    After complete ED evaluation, clinical impression is most consistent with chronic pneumothorax.    At this time, I feel there is no emergent condition requiring further evaluation or admission. I believe the patient is stable for discharge from the ED. The patient and any additional family present were updated with test results, overall clinical impression, and recommended further plan of care. All questions were answered. The patient expressed understanding and agreed with current plan for discharge with PCP/Surgery follow-up within 1 week. Strict return precautions were provided, including return/worsening of current symptoms or any other concerns.                      ED Course      Clinical Impression:     1. Chronic pneumothorax    2. Chest pain      Disposition:   Disposition: Discharged  Condition: Stable    I, Dr. Marek Stuart, personally performed the services described in this documentation. All medical record entries made by the scribe were at my direction and in my presence.  I have reviewed the chart and agree that the record reflects my personal performance and is accurate and complete.     Marek Stuart MD.                   Marek Stuart MD  01/04/18 7810

## 2017-12-24 NOTE — ED NOTES
Pt reports Hemilich valve came off PTA, pt reports he dressed the site as advised by MD, pt reports he has had collapsed lung on rt side for past 2 years, pt reports no SOB, pt denies chest pain, pt awake alert in no acute distress, family at bedside, ostomy bag noted to RT abd, denies abd pain denies fever

## 2017-12-27 NOTE — PHYSICIAN QUERY
PT Name: Moises Hernandez  MR #: [id number]    Physician Query Form - Nutrition Clarification     CDS/: Brandy E Capley               Contact information: Spectralink: [phone]    This form is a permanent document in the medical record.     Query Date: December 27, 2017    By submitting this query, we are merely seeking further clarification of documentation.. Please utilize your independent clinical judgment when addressing the question(s) below.    The Medical record contains the following:   Indicators  Supporting Clinical Findings Location in Medical Record    % of Estimated Energy Intake over a time frame from p.o., TF, or TPN      Weight Status over a time frame      Subcutaneous Fat and/or Muscle Loss      Fluid Accumulation or Edema      Reduced  Strength     X Wt / BMI / Usual Body Weight Weight: 41.3 kg (91 lb)  Body mass index is 14.25 kg/m². H&P 12/11   X Delayed Wound Healing / Failure to Thrive Failure to thrive in adult General surgery progress note 12/13   X Acute or Chronic Illness History of Present Illness: Patient just discharged 12/10 after being treated for psbo and chronic pneumothorax. He went home and had decreased po intake and dyspepsia. He had worsening abdominal distension, pain and nv.      Associated symptoms: constipation, nausea and vomiting, anorexia.     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 He was admitted, made NPO. His bowel function did return quickly after NG tube palcement. He began having ostomy output, his distention resolved and his diet was advanced. H&P 12/11        H&P 12/11    DC summary 12/14    Medication      Treatment     X Other He appears cachectic. He appears ill. He appears distressed (moderate).   Somewhat cachectic and frail appearing     Positive for appetite change and fatigue ED provider note 12/11      H&P 12/11     AND / [facility] Clinical  Characteristics (October 2011)  A minimum of two characteristics is recommended for diagnosing either moderate or severe malnutrition   Mild Malnutrition Moderate Malnutrition Severe Malnutrition   Energy Intake from p.o., TF or TPN. < 75% intake of estimated energy needs for less than 7 days < 75% intake of estimated energy needs for greater than 7 days < 50% intake of estimated energy needs for > 5 days   Weight Loss 1-2% in 1 month  5% in 3 months  7.5% in 6 months  10% in 1 year 1-2 % in 1 week  5% in 1 month  7.5% in 3 months  10% in 6 months  20% in 1 year > 2% in 1 week  > 5% in 1 month  > 7.5% in 3 months  > 10% in 6 months  > 20% in 1 year   Physical Findings     None *Mild subcutaneous fat and/or muscle loss  *Mild fluid accumulation  *Stage II decubitus  *Surgical wound or non-healing wound *Mod/severe subcutaneous fat and/or muscle loss  *Mod/severe fluid accumulation  *Stage III or IV decubitus  *Non-healing surgical wound     Provider, please specify diagnosis or diagnoses associated with above clinical findings.    [x ] Mild Protein-Calorie Malnutrition  [ ] Cachexia  [ ] Anorexia  [ ] Other Nutritional Diagnosis (please specify): ____________________________________  [ ] Other: ________________________________  [ ] Clinically Undetermined    Please document in your progress notes daily for the duration of treatment until resolved and include in your discharge summary.

## 2017-12-28 PROBLEM — J93.81 CHRONIC PNEUMOTHORAX: Status: ACTIVE | Noted: 2017-01-01

## 2017-12-28 NOTE — ED NOTES
NG tube in place. Sound on ausculation following injection of air into NG tube. Yellow/ green drainage. Inserted into right nare. Pt tolerated well.  Xray called for confirmation of placement.

## 2017-12-28 NOTE — PROGRESS NOTES
Pepcid dose decreased to 20 mg once a day per P&T approved pharmacy protocol.('s guidelines for CrCl =<50 Administer 50% of dose or increase the dosing interval to every 36 to 48 hours to limit potential CNS adverse effects.)

## 2017-12-28 NOTE — ASSESSMENT & PLAN NOTE
-Admit to general surgery  -Given that this is his 4th obstruction in 1 month, he will require surgical intervention.  Long discussion with patient and daughter about what this would entail and that with his COPD and chronic right pneumothorax, he has significant risk of pulmonary complication post-operatively. His is also quite malnourished and will need parenteral nutrition pre and post-operatively until he is able to tolerate enteral nutrition.  No acute surgical intervention is needed at this time.  -Will try to obtain PFTs inpatient  -NPO  -JAVAN smith  -Heidi

## 2017-12-28 NOTE — PROCEDURES
"Moises Hernandez is a 82 y.o. male patient.    Temp: 97.8 °F (36.6 °C) (12/28/17 1632)  Pulse: 95 (12/28/17 1632)  Resp: 18 (12/28/17 1632)  BP: 139/73 (12/28/17 1632)  SpO2: 96 % (12/28/17 1621)  Weight: 43.5 kg (95 lb 14.4 oz) (12/28/17 1500)  Height: 5' 7" (170.2 cm) (12/28/17 1500)    PICC  Date/Time: 12/28/2017 5:16 PM  Performed by: ARI TSAI  Consent Done: Yes  Time out: Immediately prior to procedure a time out was called to verify the correct patient, procedure, equipment, support staff and site/side marked as required  Indications: med administration and vascular access  Anesthesia: local infiltration  Local anesthetic: lidocaine 1% without epinephrine  Anesthetic Total (mL): 5  Preparation: skin prepped with ChloraPrep  Skin prep agent dried: skin prep agent completely dried prior to procedure  Sterile barriers: all five maximum sterile barriers used - cap, mask, sterile gown, sterile gloves, and large sterile sheet  Hand hygiene: hand hygiene performed prior to central venous catheter insertion  Location details: right brachial  Catheter type: double lumen  Catheter size: 5 Fr  Catheter Length: 39cm    Ultrasound guidance: yes  Vessel Caliber: medium and patent, compressibility poor  Needle advanced into vessel with real time Ultrasound guidance.  Guidewire confirmed in vessel.  Sterile sheath used.  Number of attempts: 1  Post-procedure: blood return through all ports, chlorhexidine patch and sterile dressing applied  Estimated blood loss (mL): 1    Assessment: placement verified by x-ray, tip termination and successful placement (PT WITH PREVIOUS RIGHT SIDED PNEUMO PRIOR TO INERTION OF PICC LINE)  Complications: none  Comments: PT TOLERATED WELL, PLEASE WAIT FOR MD REVIEW AND RAD REPORT BEFORE USING        Ari Tsai  12/28/2017    "

## 2017-12-28 NOTE — H&P
[facility]  General Surgery  History & Physical    Patient Name: Moises Hernandez  MRN: [id number]  Admission Date: 12/28/2017  Attending Physician: Marek Stuart MD   Primary Care Provider: Rodriguez Castro MD    Patient information was obtained from patient, relative(s), past medical records and ER records.     Subjective:     Chief Complaint/Reason for Admission: abdominal pain and bloating, nausea, and vomiting    History of Present Illness: Moises Hernandez is a 82 y.o. male very well known to general surgery service with history of chronic right pneumothorax (at least since May 2017), COPD, and intermittent bowel obstructions who presents for recurrent abdominal pain/bloating, nausea, and vomiting.  Symptoms began ~30 hours ago.  He has had no ostomy output since onset of symptoms.  This is his 4th readmission for the same problem this month.  He has to this point declined TPN and his obstructions have resolved each time without operative intervention.  He reports that over the past week, he had been doing very well.  He was ambulating well around the house and garden, he has been eating 3 good meals a day and supplementing with protein shakes.  His only hiccup last week was that his chest tube with Heimlich valve was accidentally removed at home on Christmas Eve.  He came to the ED to be evaluated and a CXR was stable, so no chest tube was replaced.  He denies fever, chills, chest pain, shortness of breath, blood in stool, bilious/bloody emesis.  He vomited water a couple times yesterday.    No current facility-administered medications on file prior to encounter.      Current Outpatient Prescriptions on File Prior to Encounter   Medication Sig    metoclopramide HCl (REGLAN) 10 MG tablet Take 1 tablet (10 mg total) by mouth 3 (three) times daily before meals.    ondansetron (ZOFRAN-ODT) 4 MG TbDL Take 2 tablets (8 mg total) by mouth every 8 (eight) hours as needed.    pantoprazole  (PROTONIX) 20 MG tablet Take 20 mg by mouth once daily.    PROAIR HFA 90 mcg/actuation inhaler every 4 (four) hours as needed.        Review of patient's allergies indicates:  No Known Allergies    Past Medical History:   Diagnosis Date    C. difficile diarrhea     COPD (chronic obstructive pulmonary disease)      Past Surgical History:   Procedure Laterality Date    ABDOMINAL SURGERY      EYE SURGERY      HERNIA REPAIR      ILEOSTOMY      SPLENECTOMY, TOTAL       Family History     Problem Relation (Age of Onset)    Cancer Mother    Heart attack Father        Social History Main Topics    Smoking status: Former Smoker     Packs/day: 1.00     Years: 60.00     Types: Cigarettes     Quit date: 2015    Smokeless tobacco: Never Used    Alcohol use Yes    Drug use: No    Sexual activity: Not on file     Review of Systems   Constitutional: Positive for appetite change. Negative for activity change, chills and fever.   HENT: Negative for trouble swallowing.    Respiratory: Negative for cough and shortness of breath.    Cardiovascular: Negative for chest pain and palpitations.   Gastrointestinal: Positive for abdominal distention, abdominal pain, nausea and vomiting. Negative for constipation and diarrhea.   Genitourinary: Negative for difficulty urinating and dysuria.   Musculoskeletal: Negative.    Hematological: Does not bruise/bleed easily.     Objective:     Vital Signs (Most Recent):  Temp: 97.9 °F (36.6 °C) (12/28/17 1010)  Pulse: 77 (12/28/17 0945)  Resp: 18 (12/28/17 0945)  BP: (!) 142/82 (12/28/17 0945)  SpO2: 97 % (12/28/17 0945) Vital Signs (24h Range):  Temp:  [97.9 °F (36.6 °C)-98 °F (36.7 °C)] 97.9 °F (36.6 °C)  Pulse:  [77-92] 77  Resp:  [18-24] 18  SpO2:  [97 %-98 %] 97 %  BP: (126-142)/(68-82) 142/82     Weight: 41.3 kg (91 lb)  Body mass index is 14.25 kg/m².    Physical Exam   Constitutional: He is oriented to person, place, and time. He appears cachectic. He does not appear ill. No  distress.   HENT:   Head: Normocephalic.   Eyes: Conjunctivae and EOM are normal. No scleral icterus.   Neck: Normal range of motion.   Cardiovascular: Normal rate and regular rhythm.    Pulmonary/Chest: Effort normal. No respiratory distress.   Absent breath sounds on right with asymmetric chest rise   Abdominal: He exhibits distension (tympanic). He exhibits no mass. There is tenderness (mild). There is no rebound and no guarding. No hernia.   Musculoskeletal: Normal range of motion. He exhibits no edema.   Neurological: He is alert and oriented to person, place, and time.   Skin: He is not diaphoretic.   Nursing note and vitals reviewed.      Significant Labs:  CBC:   Recent Labs  Lab 12/28/17  0859   WBC 11.60   RBC 3.56*   HGB 11.1*   HCT 33.2*   *   MCV 93   MCH 31.2*   MCHC 33.4     CMP:   Recent Labs  Lab 12/28/17  0859      CALCIUM 9.2   *   K 5.3*   CO2 23   CL 95   BUN 24*   CREATININE 1.1     Lactate: 1.3    Significant Diagnostics:  Abd x-ray: SBO    Assessment/Plan:     SBO (small bowel obstruction)    -Admit to general surgery  -Given that this is his 4th obstruction in 1 month, he will require surgical intervention.  Long discussion with patient and daughter about what this would entail and that with his COPD and chronic right pneumothorax, he has significant risk of pulmonary complication post-operatively. His is also quite malnourished and will need parenteral nutrition pre and post-operatively until he is able to tolerate enteral nutrition.  No acute surgical intervention is needed at this time.  -Will try to obtain PFTs inpatient  -NPO  -NG trube  -IVFs        Failure to thrive in adult    -Long discussion with patient and daughter; in the past they have declined TPN; however, given that this is his 4th episode of SBO in 1 month, we strongly advised parenteral nutrition; and they both agreed  -Consult to PICC team  -TPN  -nutrition labs every Monday/Friday        Hyponatremia     -Normal saline for mIVFs; check daily        Chronic pneumothorax    -Chest tube accidentally removed [place] Dale without consequence  -He is on room air and hemodynamically stable  -No need for a new chest tube        Abdominal pain    -PRN morphine        COPD (chronic obstructive pulmonary disease) with acute bronchitis    -PRN Duo-nebs  -Patient on room air at home and doing well in ED on room air          VTE Risk Mitigation         Ordered     heparin (porcine) injection 5,000 Units  Every 8 hours     Route:  Subcutaneous        12/28/17 1012     Medium Risk of VTE  Once      12/28/17 1012     Place sequential compression device  Until discontinued      12/28/17 1012     Place SERGO hose  Until discontinued      12/28/17 1012          Ari House Jr., MD  General Surgery  [facility]

## 2017-12-28 NOTE — ED NOTES
Pt denies any sob or N/v. Informed that he will be going to his room shortly. Pt on cardiac monitor. Call bell within reach.

## 2017-12-28 NOTE — ASSESSMENT & PLAN NOTE
-Long discussion with patient and daughter; in the past they have declined TPN; however, given that this is his 4th episode of SBO in 1 month, we strongly advised parenteral nutrition; and they both agreed  -Consult to PICC team  -TPN  -nutrition labs every Monday/Friday

## 2017-12-28 NOTE — ED NOTES
82 year old male presents to ed cc of abdominal pain. Patient has colostomy bagged that has not drained in two days. Patient denies chest pain fever states sob has been the same with recent diagnosis of right side pneumothorax patient awake alert ox4 call light at bedside daughter at bedside patient able to identify name and  on armband patient placed on bp cuff continuous pulse ox nurse will continue to monitor

## 2017-12-28 NOTE — ED PROVIDER NOTES
Encounter Date: 12/28/2017    SCRIBE #1 NOTE: I, Raymon Marques, am scribing for, and in the presence of,  Dr. Raymon Marques. I have scribed the entire note.       History     Chief Complaint   Patient presents with    Abdominal Pain     abdominal pain, has a colostomy and has had no output in 2 days, also has recent diagnosis of pneumothorax on right side, states SOB is about the same     Time patient was seen by the provider: 8:32 AM    The patient is a 82 y.o. male with hx of: performated c.diff colitis s/p subtotal colectomy and ileostomy 2 yrs prior who presents to the ED with a complaint of abdominal pain and decreased output in colostomy bag. Decreased output has been present for the last 24 hours. Appetite has been weak but not significantly reduced from baseline.     Patient well-known to ED and General Surgery, as patient has had multiple recent admissions for SBO. Last admitted 12/17-12/20 for SBO, symptoms improved after NGT and supportive management.        The history is provided by a relative and the patient.     Review of patient's allergies indicates:  No Known Allergies  Past Medical History:   Diagnosis Date    C. difficile diarrhea     COPD (chronic obstructive pulmonary disease)      Past Surgical History:   Procedure Laterality Date    ABDOMINAL SURGERY      EYE SURGERY      HERNIA REPAIR      ILEOSTOMY      SPLENECTOMY, TOTAL       Family History   Problem Relation Age of Onset    Cancer Mother      stomach    Heart attack Father      Social History   Substance Use Topics    Smoking status: Former Smoker     Packs/day: 1.00     Years: 60.00     Types: Cigarettes     Quit date: 2015    Smokeless tobacco: Never Used    Alcohol use Yes     Review of Systems   Constitutional: Negative for fever.   HENT: Negative for sore throat.    Respiratory: Negative for shortness of breath.    Cardiovascular: Negative for chest pain.   Gastrointestinal: Positive for abdominal distention and abdominal  pain. Negative for diarrhea, nausea and vomiting.        Decreased ostomy bag output   Genitourinary: Negative for dysuria.   Musculoskeletal: Negative for back pain.   Skin: Negative for rash.   Neurological: Negative for weakness.   Hematological: Does not bruise/bleed easily.       Physical Exam     Initial Vitals   BP Pulse Resp Temp SpO2   -- -- -- -- --      MAP       --         Physical Exam    Nursing note and vitals reviewed.  Constitutional: He is not diaphoretic. No distress.   Cachectic, thin, temporal wasting   HENT:   Head: Normocephalic and atraumatic.   Eyes: Conjunctivae are normal. Pupils are equal, round, and reactive to light.   Neck: Normal range of motion. Neck supple.   Cardiovascular: Normal rate, regular rhythm and normal heart sounds.   Pulmonary/Chest: No respiratory distress.   Diminished breath sounds on the right.    Abdominal: He exhibits distension. There is tenderness (mild).   Ostomy to the mid abdomen with pink stoma, well perfused.   Musculoskeletal: Normal range of motion.   Neurological: He is alert and oriented to person, place, and time.   Skin: Skin is dry.   Psychiatric: He has a normal mood and affect.         ED Course   Procedures  Labs Reviewed   CBC WITHOUT DIFFERENTIAL - Abnormal; Notable for the following:        Result Value    RBC 3.56 (*)     Hemoglobin 11.1 (*)     Hematocrit 33.2 (*)     MCH 31.2 (*)     Platelets 394 (*)     All other components within normal limits   BASIC METABOLIC PANEL - Abnormal; Notable for the following:     Sodium 127 (*)     Potassium 5.3 (*)     BUN, Bld 24 (*)     All other components within normal limits   LACTIC ACID, PLASMA        Imaging Results                   X-Ray Abdomen Flat And Erect (Final result)  Result time 12/28/17 09:36:43    Final result by Dipti Bhatti MD (12/28/17 09:36:43)                 Impression:     There is a large right pneumothorax similar to prior exam. There are findings concerning for small bowel  obstruction.    This report has been flagged as abnormal in EPIC.         Electronically signed by: SHANTE JIMENEZ MD  Date:     12/28/17  Time:    09:36              Narrative:    There is continued large right pneumothorax as seen on the 17th. There are dilated loops of small bowel with air-fluid levels in upper abdomen. There is osteopenia.                                 Medical Decision Making:   History:   Old Medical Records: I decided to obtain old medical records.  Initial Assessment:   82 y.o. 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 On arrival, pt is in NAD, exam with abdominal distension and mild tenderness around ostomy site. Will obtain abdominal series, basic lab, IV fluids, abdominal X-ray, and will contact surgery given multiple presentations with recurrent symptoms.  Differential Diagnosis:   Differential Diagnosis includes, but is not limited to:  AAA, aortic dissection, mesenteric ischemia, perforated viscous, MI/ACS, SBO/volvulus, incarcerated/strangulated hernia, intussusception, ileus, appendicitis, cholecystitis, cholangitis, diverticulitis, esophagitis, hepatitis, nephrolithiasis, pancreatitis, gastroenteritis, colitis, IBD/IBS, biliary colic, GERD, PUD, constipation, UTI/pyelonephritis,  disorder.    Clinical Tests:   Lab Tests: Ordered and Reviewed  Radiological Study: Reviewed and Ordered  ED Management:  Labs stable.  X-ry suggestive of recurrent SBO.  General surgery contacted, who will admit.     Other:   I have discussed this case with another health care provider.       <> Summary of the Discussion: 10:56 AM  Discussed patient case with Dr. Ugarte in general surgery, who will admit.  Upon re-evaluation, the patient's status has remained stable.    At this  time, I believe the patient should be admitted to the hospital for further evaluation and management of SBO.    General Surgery team was contacted and the case was discussed. The consulting physician agrees with plan and will admit under their service. Additional recommendations at this time: none    The patient and family were updated with test results, overall impression, and further plan of care. All questions were answered. The patient expressed understanding and agreed with the current plan.                       ED Course        Clinical Impression:   The primary encounter diagnosis was SBO (small bowel obstruction). Diagnoses of Abdominal pain and Encounter for imaging study to confirm nasogastric (NG) tube placement were also pertinent to this visit.    Disposition:   Disposition: Admitted      I, Dr. Marek Stuart, personally performed the services described in this documentation. All medical record entries made by the scribe were at my direction and in my presence.  I have reviewed the chart and agree that the record reflects my personal performance and is accurate and complete.     Marek Stuart MD.                      Marek Stuart MD  01/04/18 [id number]

## 2017-12-28 NOTE — ED NOTES
APPEARANCE: Alert, oriented and in no acute distress.  CARDIAC: Normal rate and rhythm, no murmur heard.   PERIPHERAL VASCULAR: peripheral pulses present. Normal cap refill. No edema. Warm to touch.    MUSC: Full ROM. No bony tenderness or soft tissue tenderness. No obvious deformity.  SKIN: Skin is warm and dry, normal skin turgor, mucous membranes moist.  NEURO: 5/5 strength major flexors/extensors bilaterally. Sensory intact to light touch bilaterally. [place] coma scale: eyes open spontaneously-4, oriented & converses-5, obeys commands-6. No neurological abnormalities.   MENTAL STATUS: awake, alert and aware of environment.  EYE: PERRL, both eyes: pupils brisk and reactive to light. Normal size.  ENT: EARS: no obvious drainage. NOSE: no active bleeding.

## 2017-12-28 NOTE — SUBJECTIVE & OBJECTIVE
No current facility-administered medications on file prior to encounter.      Current Outpatient Prescriptions on File Prior to Encounter   Medication Sig    metoclopramide HCl (REGLAN) 10 MG tablet Take 1 tablet (10 mg total) by mouth 3 (three) times daily before meals.    ondansetron (ZOFRAN-ODT) 4 MG TbDL Take 2 tablets (8 mg total) by mouth every 8 (eight) hours as needed.    pantoprazole (PROTONIX) 20 MG tablet Take 20 mg by mouth once daily.    PROAIR HFA 90 mcg/actuation inhaler every 4 (four) hours as needed.        Review of patient's allergies indicates:  No Known Allergies    Past Medical History:   Diagnosis Date    C. difficile diarrhea     COPD (chronic obstructive pulmonary disease)      Past Surgical History:   Procedure Laterality Date    ABDOMINAL SURGERY      EYE SURGERY      HERNIA REPAIR      ILEOSTOMY      SPLENECTOMY, TOTAL       Family History     Problem Relation (Age of Onset)    Cancer Mother    Heart attack Father        Social History Main Topics    Smoking status: Former Smoker     Packs/day: 1.00     Years: 60.00     Types: Cigarettes     Quit date: 2015    Smokeless tobacco: Never Used    Alcohol use Yes    Drug use: No    Sexual activity: Not on file     Review of Systems   Constitutional: Positive for appetite change. Negative for activity change, chills and fever.   HENT: Negative for trouble swallowing.    Respiratory: Negative for cough and shortness of breath.    Cardiovascular: Negative for chest pain and palpitations.   Gastrointestinal: Positive for abdominal distention, abdominal pain, nausea and vomiting. Negative for constipation and diarrhea.   Genitourinary: Negative for difficulty urinating and dysuria.   Musculoskeletal: Negative.    Hematological: Does not bruise/bleed easily.     Objective:     Vital Signs (Most Recent):  Temp: 97.9 °F (36.6 °C) (12/28/17 1010)  Pulse: 77 (12/28/17 0945)  Resp: 18 (12/28/17 0945)  BP: (!) 142/82 (12/28/17 0945)  SpO2:  97 % (12/28/17 0945) Vital Signs (24h Range):  Temp:  [97.9 °F (36.6 °C)-98 °F (36.7 °C)] 97.9 °F (36.6 °C)  Pulse:  [77-92] 77  Resp:  [18-24] 18  SpO2:  [97 %-98 %] 97 %  BP: (126-142)/(68-82) 142/82     Weight: 41.3 kg (91 lb)  Body mass index is 14.25 kg/m².    Physical Exam   Constitutional: He is oriented to person, place, and time. He appears cachectic. He does not appear ill. No distress.   HENT:   Head: Normocephalic.   Eyes: Conjunctivae and EOM are normal. No scleral icterus.   Neck: Normal range of motion.   Cardiovascular: Normal rate and regular rhythm.    Pulmonary/Chest: Effort normal. No respiratory distress.   Absent breath sounds on right with asymmetric chest rise   Abdominal: He exhibits distension (tympanic). He exhibits no mass. There is tenderness (mild). There is no rebound and no guarding. No hernia.   Musculoskeletal: Normal range of motion. He exhibits no edema.   Neurological: He is alert and oriented to person, place, and time.   Skin: He is not diaphoretic.   Nursing note and vitals reviewed.      Significant Labs:  CBC:   Recent Labs  Lab 12/28/17  0859   WBC 11.60   RBC 3.56*   HGB 11.1*   HCT 33.2*   *   MCV 93   MCH 31.2*   MCHC 33.4     CMP:   Recent Labs  Lab 12/28/17  0859      CALCIUM 9.2   *   K 5.3*   CO2 23   CL 95   BUN 24*   CREATININE 1.1     Lactate: 1.3    Significant Diagnostics:  Abd x-ray: SBO

## 2017-12-28 NOTE — ASSESSMENT & PLAN NOTE
-Chest tube accidentally removed Jason Dale without consequence  -He is on room air and hemodynamically stable  -No need for a new chest tube

## 2017-12-28 NOTE — ED NOTES
Pt placedon cardiac monitor. Call bell within reach. Family at bedside. Pt is resting quietly. Reports abd pain around his colostomy and reports hasnt been having drainage for a few days. Will continue to monitor

## 2017-12-29 NOTE — PLAN OF CARE
Problem: Nutrition, Parenteral (Adult)  Goal: Signs and Symptoms of Listed Potential Problems Will be Absent, Minimized or Managed (Nutrition, Parenteral)  Signs and symptoms of listed potential problems will be absent, minimized or managed by discharge/transition of care (reference Nutrition, Parenteral (Adult) CPG).  Outcome: Ongoing (interventions implemented as appropriate)  Recommendation/Intervention:   1. Add Daily IVFE to better meet pt needs.   2. Initiate Clear Liquid diet when medically acceptable.     Goals:   Pt will tolerate TPN  Nutrition Goal Status: new

## 2017-12-29 NOTE — PLAN OF CARE
Patient known to TN from prior admit.  Patient has no HH prior to admit. Patient has a lot of support and stays at different family member homes.    Colostomy in place- supplies received by [facility]. Pt also has SC and cane.     Update: TN spoke with Dr. Ugarte, plan to d/c patient with TPN and lipids. TN has updated Samantha with [facility] and Cuong from [facility]. Samantha updating auth team.      Cuong will come educate patient and daughter Laura in am.  Cuong confirmed  auth will need to be received from [facility] prior to mixing medication for patient.       Samantha  weekend [id number] -  updated of above.      Patient facesheet update: [place] [address] [place] [address]    Update: Samantha with PHN called [place], iv infusion authorized and will be sent to [facility] and Raul .       12/29/17 9806   Discharge Assessment   Assessment Type Discharge Planning Assessment   Confirmed/corrected address and phone number on facesheet? Yes   Assessment information obtained from? Patient;Caregiver  (laura-)   Prior to hospitilization cognitive status: Alert/Oriented   Prior to hospitalization functional status: Assistive Equipment   Current cognitive status: Alert/Oriented   Current Functional Status: Assistive Equipment   Lives With other relative(s);other (see comments)   Able to Return to Prior Arrangements yes   Is patient able to care for self after discharge? Unable to determine at this time (comments)   Who are your caregiver(s) and their phone number(s)? laura   Patient's perception of discharge disposition home or selfcare   Readmission Within The Last 30 Days other (see comments)   Patient currently being followed by outpatient case management? No   Patient currently receives any other outside agency services? No   Equipment Currently Used at Home 3-in-1 commode   Do you have any problems affording any of your prescribed medications? No   Is the patient taking medications as prescribed? yes   Does the patient  have transportation home? No   Does the patient receive services at the [facility]? No   Discharge Plan A Home with family   Discharge Plan B Home with family   Patient/Family In Agreement With Plan yes

## 2017-12-29 NOTE — SUBJECTIVE & OBJECTIVE
Interval History: Lots of ostomy output with resolution of nausea, abd pain, and abd distention.  Desires coffee.  Has not been out of bed yet today.  Afebrile and HD stable.  Tolerating TPN and lipids.    Medications:  Continuous Infusions:   Amino acid 5% - dextrose 15% (CLINIMIX-E) solution with additives (1L  provides 510 kcal/L dextrose, with 50 gm AA, 150 gm CHO, Na 35, K 30, Mg 5, Ca 4.5, Acetate 80, Cl 39, Phos 15) 75 mL/hr at 12/28/17 1830    Amino acid 5% - dextrose 15% (CLINIMIX-E) solution with additives (1L  provides 510 kcal/L dextrose, with 50 gm AA, 150 gm CHO, Na 35, K 30, Mg 5, Ca 4.5, Acetate 80, Cl 39, Phos 15)       Scheduled Meds:   famotidine (PF)  20 mg Intravenous Daily    fat emulsion 20%  250 mL Intravenous Daily    heparin (porcine)  5,000 Units Subcutaneous Q8H    [START ON 12/30/2017] multivit-mins-ferrous gluconat  15 mL Oral Every Tues, Thurs, Sat, Sun    sodium chloride 0.9%  10 mL Intravenous Q6H     PRN Meds:albuterol-ipratropium 2.5mg-0.5mg/3mL, morphine, ondansetron, Flushing PICC Protocol **AND** sodium chloride 0.9% **AND** sodium chloride 0.9%     Review of patient's allergies indicates:  No Known Allergies  Objective:     Vital Signs (Most Recent):  Temp: 98.4 °F (36.9 °C) (12/29/17 0750)  Pulse: 97 (12/29/17 0750)  Resp: 18 (12/29/17 0750)  BP: (!) 111/54 (12/29/17 0750)  SpO2: 96 % (12/29/17 1120) Vital Signs (24h Range):  Temp:  [96.4 °F (35.8 °C)-98.4 °F (36.9 °C)] 98.4 °F (36.9 °C)  Pulse:  [78-97] 97  Resp:  [16-18] 18  SpO2:  [95 %-97 %] 96 %  BP: ()/(46-78) 111/54     Weight: 43.5 kg (95 lb 14.4 oz)  Body mass index is 15.02 kg/m².    Intake/Output - Last 3 Shifts       12/27 0700 - 12/28 0659 12/28 0700 - 12/29 0659 12/29 0700 - 12/30 0659    I.V. (mL/kg)  335.4 (7.7)     Total Intake(mL/kg)  335.4 (7.7)     Urine (mL/kg/hr)  606 200 (0.8)    Drains  100     Stool  600 210 (0.8)    Total Output   1306 410    Net   -970.6 -410                 Physical  Exam   Constitutional: He is oriented to person, place, and time. He appears cachectic.  Non-toxic appearance. No distress.   HENT:   Head: Normocephalic and atraumatic.   Eyes: Conjunctivae and EOM are normal. No scleral icterus.   Neck: Normal range of motion.   Cardiovascular: Normal rate and regular rhythm.    Pulmonary/Chest: Effort normal. No respiratory distress.   Abdominal: Soft. He exhibits no distension. There is no tenderness. There is no rebound and no guarding.   Ostomy with stool and gas in bag   Musculoskeletal: He exhibits no edema.   Neurological: He is alert and oriented to person, place, and time.   Skin: Skin is warm and dry. Capillary refill takes less than 2 seconds.   Psychiatric: He has a normal mood and affect.   Nursing note and vitals reviewed.      Significant Labs:  CBC:   Recent Labs  Lab 12/29/17  0620   WBC 8.56   RBC 3.21*   HGB 9.8*   HCT 30.4*   *   MCV 95   MCH 30.5   MCHC 32.2     CMP:   Recent Labs  Lab 12/29/17  0558     100   CALCIUM 8.2*  8.2*   ALBUMIN 2.6*   PROT 5.2*     136   K 4.4  4.4   CO2 27  27     105   BUN 21  21   CREATININE 0.8  0.8   ALKPHOS 66   ALT 12   AST 19   BILITOT 0.5     Prealbumin - 11    Significant Diagnostics:  none new to review

## 2017-12-29 NOTE — ASSESSMENT & PLAN NOTE
-Given that this is his 4th obstruction in 1 month, he will likely require surgical intervention.  Long discussion with patient and daughter about what this would entail and that with his COPD and chronic right pneumothorax, he has significant risk of pulmonary complication post-operatively. His is also quite malnourished and will need parenteral nutrition pre and post-operatively until he is able to tolerate enteral nutrition.  No acute surgical intervention is needed at this time.  -PFTs  -advance to clear liquid diet  -continue full TPN & lipids

## 2017-12-29 NOTE — PROGRESS NOTES
"[facility]  Adult Nutrition  Progress Note    SUMMARY     Recommendations    Recommendation/Intervention:   1. Add Daily IVFE to better meet pt needs.   2. Initiate Clear Liquid diet when medically acceptable.    Goals:   Pt will tolerate TPN  Nutrition Goal Status: new     Continuum of Care Plan  Referral to Outpatient Services:  (d/c needs to be determined)    Reason for Assessment  Reason for Assessment: new TPN  Diagnosis:  (N/V)  Relevent Medical History: COPD, cdiff, hernia repair, splenectomy, ileostomy      General Information Comments: Pt NPO. NG tube to suction. PICC line placed for TPN.Receiving Clinimix 5/15 at 75ml/hr. Has colostomy.    Nutrition Prescription Ordered  Current Diet Order: NPO  Current Nutrition Support Formula Ordered: Clinimix E 5/15  Current Nutrition Support Rate Ordered: 75 (ml)  Current Nutrition Support Frequency Ordered: ml/hr      Evaluation of Received Nutrients/Fluid Intake  Parenteral Calories (kcal): 1278  Parenteral Protein (gm): 90  Parenteral Fluid (mL): 1800  Energy Calories Required: not meeting needs  % Kcal Needs: 54-63  Protein Required: meeting needs  % Protein Needs: 111-134  GIR (Glucose Infusion Rate) (mg/kg/min): 4.3 mg/kg/min  % Intake of Estimated Energy Needs: 50 - 75 %  % Meal Intake: NPO     Nutrition Risk Screen  Parenteral nutrition    Nutrition/Diet History  Food Preferences: no Jainism or cultural food prefs identified  Factors Affecting Nutritional Intake: altered gastrointestinal function, NPO     Labs/Tests/Procedures/Meds  Pertinent Labs Reviewed: reviewed  Pertinent Labs Comments: Na 127L, K 5.3L, BUN 24H  Pertinent Medications Reviewed: reviewed  Pertinent Medications Comments: MVI    Physical Findings  Overall Physical Appearance: underweight  Tubes: nasogastric tube (colostomy)  Oral/Mouth Cavity:  (unable to assess)  Skin:  (Simba 19-intact)    Anthropometrics  Temp: 98.4 °F (36.9 °C)  Height: 5' 7" (170.2 cm)  Weight " Method: Bed Scale  Weight: 43.5 kg (95 lb 14.4 oz)  Ideal Body Weight (IBW), Male: 148 lb  % Ideal Body Weight, Male (lb): 64.8 lb  BMI (Calculated): 15.1  BMI Grade: less than 16 protein-energy malnutrition grade III     Estimated/Assessed Needs  Weight Used For Calorie Calculations: 67.2 kg (148 lb 2.4 oz) (IBW)   Energy Calorie Requirements (kcal): 2016-2352  Energy Need Method: Kcal/kg (30-35 kcal/kg)  RMR (Byron-St. Jeor Equation): 1340.62  Weight Used For Protein Calculations: 67.2 kg (148 lb 2.4 oz) (IBW)  Protein Requirements: 67-81g (1.0-1.2g/kg)  Fluid Need Method: RDA Method  RDA Method (mL): 2016     Assessment and Plan    Failure to thrive in adult    Related to (etiology):   Dx/NPO    Signs and Symptoms (as evidenced by):   BMI 15    Interventions/Recommendations (treatment strategy):  See recs    Nutrition Diagnosis Status:   New            Monitor and Evaluation  Food and Nutrient Intake: parenteral nutrition intake  Food and Nutrient Adminstration: enteral and parenteral nutrition administration  Physical Activity and Function: nutrition-related ADLs and IADLs  Anthropometric Measurements: weight  Biochemical Data, Medical Tests and Procedures: electrolyte and renal panel, gastrointestinal profile, lipid profile  Nutrition-Focused Physical Findings: overall appearance    Nutrition Risk  Level of Risk:  (2xweekly)    Nutrition Follow-Up  RD Follow-up?: Yes

## 2017-12-29 NOTE — ASSESSMENT & PLAN NOTE
Related to (etiology):   Dx/NPO    Signs and Symptoms (as evidenced by):   BMI 15    Interventions/Recommendations (treatment strategy):  See recs    Nutrition Diagnosis Status:   New

## 2017-12-29 NOTE — PLAN OF CARE
[facility] HEALTH ORDERS  FACE TO FACE ENCOUNTER    Patient Name: Moises Hernandez  YOB: 1935    PCP: Rodriguez Castro MD   PCP Address: [address] / [place] [address]  PCP Phone Number: [phone]  PCP Fax: [phone]    Encounter Date: 12/29/2017    Admit to Home Health    Diagnoses:  Active Hospital Problems    Diagnosis  POA    SBO (small bowel obstruction) [K56.609]  Yes    Failure to thrive in adult [R62.7]  Yes    Hyponatremia [E87.1]  Yes    Chronic pneumothorax [J93.81]  Yes    Abdominal pain [R10.9]  Yes    COPD (chronic obstructive pulmonary disease) with acute bronchitis [J44.0, J20.9]  Yes      Resolved Hospital Problems    Diagnosis Date Resolved POA   No resolved problems to display.       No future appointments.        I have seen and examined this patient face to face today. My clinical findings that support the need for the home health skilled services and home bound status are the following:  Weakness/numbness causing balance and gait disturbance due to Weakness/Debility making it taxing to leave home.    Allergies:Review of patient's allergies indicates:  No Known Allergies    Diet: NPO    Activities: activity as tolerated    Nursing:   SN to complete comprehensive assessment including routine vital signs. Instruct on disease process and s/s of complications to report to MD. Review/verify medication list sent home with the patient at time of discharge  and instruct patient/caregiver as needed. Frequency may be adjusted depending on start of care date.    Notify MD if SBP > 160 or < 90; DBP > 90 or < 50; HR > 120 or < 50; Temp > 101; Other:   Other medical concerns      CONSULTS:    Aide to provide assistance with personal care, ADLs, and vital signs.    MISCELLANEOUS CARE:  Home Infusion Therapy:   SN to perform Infusion Therapy/Central Line Care.  Review Central Line Care & Central Line Flush with  patient.    Administer (drug and dose): TPN - orders below      Scrub the Hub: Prior to accessing the line, always perform a 30 second alcohol scrub  Each lumen of the central line is to be flushed at least daily with 10 mL Normal Saline and 3 mL Heparin flush (100 units/mL)  Skilled Nurse (SN) may draw blood from IV access  Blood Draw Procedure:   - Aspirate at least 5 mL of blood   - Discard   - Obtain specimen   - Change posiflow cap   - Flush with 20 mL Normal Saline followed by a                 3-5 mL Heparin flush (100 units/mL)  Central :   - Sterile dressing changes are done weekly and as needed.   - Use chlor-hexadine scrub to cleanse site, apply Biopatch to insertion site,       apply securement device dressing   - Posi-flow caps are changed weekly and after EVERY lab draw.   - If sterile gauze is under dressing to control oozing,                 dressing change must be performed every 24 hours until gauze is not needed.    TPN ORDERS:    - Formula:   Component Order Dose Admin Dose   parenteral amino acid 15% No.2 15 % Solp 4 % 96 g   dextrose 70% Solp 15 % 360.003 g   sterile water Solp 1,152.6418 mLs 1,152.6418 mL   sodium acetate 2 mEq/mL Soln 40 mEq 40 mEq   sodium chloride (23.4%) 4 mEq/mL Solp 60 mEq 60 mEq   sodium phosphate 3 mmol/mL Soln 5 mEq 5 mEq   potassium acetate 2 mEq/mL Soln 30 mEq 30 mEq   potassium phosphate 3 mmol/mL Soln 30 mEq 30 mEq   potassium chloride 2 mEq/mL Soln 20 mEq 20 mEq   magnesium sulfate 4 mEq/mL (50 %) Soln 2 g 2 g   calcium gluconate 100 mg/mL (10%) Soln 1 g 1 g   mvi, adult no.1 3,300 unit- 150 mcg/10 mL Soln 10 mLs 10 mL   trace elements [id number] -1000 mcg/mL Soln 1 mL 1 mL         - Route: Intravenous    - Rate: 100 mL/hour (2400 mL per day), continuously, for 24 hours daily    - FAT EMULSION ORDERS:  fat emulsion 20% infusion 250 mL      Ordered Dose: 250 mL Route: Intravenous Frequency: Daily @ 20.8 mL/hr over 12 Hours          WOUND CARE ORDERS  n/a      Medications: Review discharge medications with patient and family and provide education.      Current Discharge Medication List      CONTINUE these medications which have NOT CHANGED    Details   metoclopramide HCl (REGLAN) 10 MG tablet Take 1 tablet (10 mg total) by mouth 3 (three) times daily before meals.  Qty: 90 tablet, Refills: 1      ondansetron (ZOFRAN-ODT) 4 MG TbDL Take 2 tablets (8 mg total) by mouth every 8 (eight) hours as needed.  Qty: 20 tablet, Refills: 0      pantoprazole (PROTONIX) 20 MG tablet Take 20 mg by mouth once daily.      PROAIR HFA 90 mcg/actuation inhaler every 4 (four) hours as needed.              I certify that this patient is confined to his home and needs intermittent skilled nursing care.

## 2017-12-29 NOTE — PROGRESS NOTES
[facility]  General Surgery  Progress Note    Subjective:     History of Present Illness:  Moises Hernandez is a 82 y.o. male very well known to general surgery service with history of chronic right pneumothorax (at least since May 2017), COPD, and intermittent bowel obstructions who presents for recurrent abdominal pain/bloating, nausea, and vomiting.  Symptoms began ~30 hours ago.  He has had no ostomy output since onset of symptoms.  This is his 4th readmission for the same problem this month.  He has to this point declined TPN and his obstructions have resolved each time without operative intervention.  He reports that over the past week, he had been doing very well.  He was ambulating well around the house and garden, he has been eating 3 good meals a day and supplementing with protein shakes.  His only hiccup last week was that his chest tube with Heimlich valve was accidentally removed at home on Christmas Eve.  He came to the ED to be evaluated and a CXR was stable, so no chest tube was replaced.  He denies fever, chills, chest pain, shortness of breath, blood in stool, bilious/bloody emesis.  He vomited water a couple times yesterday.    Post-Op Info:  * No surgery found *         Interval History: Lots of ostomy output with resolution of nausea, abd pain, and abd distention.  Desires coffee.  Has not been out of bed yet today.  Afebrile and HD stable.  Tolerating TPN and lipids.    Medications:  Continuous Infusions:   Amino acid 5% - dextrose 15% (CLINIMIX-E) solution with additives (1L  provides 510 kcal/L dextrose, with 50 gm AA, 150 gm CHO, Na 35, K 30, Mg 5, Ca 4.5, Acetate 80, Cl 39, Phos 15) 75 mL/hr at 12/28/17 1830    Amino acid 5% - dextrose 15% (CLINIMIX-E) solution with additives (1L  provides 510 kcal/L dextrose, with 50 gm AA, 150 gm CHO, Na 35, K 30, Mg 5, Ca 4.5, Acetate 80, Cl 39, Phos 15)       Scheduled Meds:   famotidine (PF)  20 mg Intravenous Daily    fat  emulsion 20%  250 mL Intravenous Daily    heparin (porcine)  5,000 Units Subcutaneous Q8H    [START ON 12/30/2017] multivit-mins-ferrous gluconat  15 mL Oral Every Tues, Thurs, Sat, Sun    sodium chloride 0.9%  10 mL Intravenous Q6H     PRN Meds:albuterol-ipratropium 2.5mg-0.5mg/3mL, morphine, ondansetron, Flushing PICC Protocol **AND** sodium chloride 0.9% **AND** sodium chloride 0.9%     Review of patient's allergies indicates:  No Known Allergies  Objective:     Vital Signs (Most Recent):  Temp: 98.4 °F (36.9 °C) (12/29/17 0750)  Pulse: 97 (12/29/17 0750)  Resp: 18 (12/29/17 0750)  BP: (!) 111/54 (12/29/17 0750)  SpO2: 96 % (12/29/17 1120) Vital Signs (24h Range):  Temp:  [96.4 °F (35.8 °C)-98.4 °F (36.9 °C)] 98.4 °F (36.9 °C)  Pulse:  [78-97] 97  Resp:  [16-18] 18  SpO2:  [95 %-97 %] 96 %  BP: ()/(46-78) 111/54     Weight: 43.5 kg (95 lb 14.4 oz)  Body mass index is 15.02 kg/m².    Intake/Output - Last 3 Shifts       12/27 0700 - 12/28 0659 12/28 0700 - 12/29 0659 12/29 0700 - 12/30 0659    I.V. (mL/kg)  335.4 (7.7)     Total Intake(mL/kg)  335.4 (7.7)     Urine (mL/kg/hr)  606 200 (0.8)    Drains  100     Stool  600 210 (0.8)    Total Output   1306 410    Net   -970.6 -410                 Physical Exam   Constitutional: He is oriented to person, place, and time. He appears cachectic.  Non-toxic appearance. No distress.   HENT:   Head: Normocephalic and atraumatic.   Eyes: Conjunctivae and EOM are normal. No scleral icterus.   Neck: Normal range of motion.   Cardiovascular: Normal rate and regular rhythm.    Pulmonary/Chest: Effort normal. No respiratory distress.   Abdominal: Soft. He exhibits no distension. There is no tenderness. There is no rebound and no guarding.   Ostomy with stool and gas in bag   Musculoskeletal: He exhibits no edema.   Neurological: He is alert and oriented to person, place, and time.   Skin: Skin is warm and dry. Capillary refill takes less than 2 seconds.   Psychiatric: He  has a normal mood and affect.   Nursing note and vitals reviewed.      Significant Labs:  CBC:   Recent Labs  Lab 12/29/17  0620   WBC 8.56   RBC 3.21*   HGB 9.8*   HCT 30.4*   *   MCV 95   MCH 30.5   MCHC 32.2     CMP:   Recent Labs  Lab 12/29/17  0558     100   CALCIUM 8.2*  8.2*   ALBUMIN 2.6*   PROT 5.2*     136   K 4.4  4.4   CO2 27  27     105   BUN 21  21   CREATININE 0.8  0.8   ALKPHOS 66   ALT 12   AST 19   BILITOT 0.5     Prealbumin - 11    Significant Diagnostics:  none new to review    Assessment/Plan:     SBO (small bowel obstruction)    -Given that this is his 4th obstruction in 1 month, he will likely require surgical intervention.  Long discussion with patient and daughter about what this would entail and that with his COPD and chronic right pneumothorax, he has significant risk of pulmonary complication post-operatively. His is also quite malnourished and will need parenteral nutrition pre and post-operatively until he is able to tolerate enteral nutrition.  No acute surgical intervention is needed at this time.  -PFTs  -advance to clear liquid diet  -continue full TPN & lipids        Failure to thrive in adult    -Long discussion with patient and daughter; in the past they have declined TPN; however, given that this is his 4th episode of SBO in 1 month, we strongly advised parenteral nutrition; and they both agreed  -TPN  -nutrition labs every Monday/Friday        Hyponatremia    -Resolved  -Will monitor on TPN        Chronic pneumothorax    -Chest tube accidentally removed Jason Dale without consequence  -He is on room air and hemodynamically stable  -No need for a new chest tube        Abdominal pain    -PRN morphine        COPD (chronic obstructive pulmonary disease) with acute bronchitis    -PRN Duo-nebs  -Patient on room air at home and doing well on room air as inpatient            Ari House Jr., MD  General Surgery  [facility]

## 2017-12-29 NOTE — ASSESSMENT & PLAN NOTE
-Long discussion with patient and daughter; in the past they have declined TPN; however, given that this is his 4th episode of SBO in 1 month, we strongly advised parenteral nutrition; and they both agreed  -TPN  -nutrition labs every Monday/Friday

## 2017-12-29 NOTE — PLAN OF CARE
Problem: Patient Care Overview  Goal: Plan of Care Review  Outcome: Ongoing (interventions implemented as appropriate)  AAOx4, resting in bed, VSS, NAD, family at bedside.  Tele in place, NSR.  C/o abdominal pain, PRN medications administered.  Denies N/V and SOB.  NGT to low continuous suction with green/clear output, removed accidentally per patient.  Replaced NGT to right nare, pt tolerated well, clamped and waiting for CXR for confirmation of placement.  PICC to ABHILASH placed by PICC nurse at bedside, pt tolerated well.  TPN and IVFs infusing per MAR. NPO status maintained.  Medications administered as ordered.  Instructed to call for assistance.  Safety maintained.  Will continue to monitor.

## 2017-12-29 NOTE — PROGRESS NOTES
.Pharmacy New Medication Education    Patient accepted medication education.    Pharmacy educated patient on name and purpose of medications and possible side effects, using the teach-back method.     D/C Current Inpatient Medication Orders   D/C 0.9% NaCl infusion   D/C albuterol-ipratropium 2.5mg-0.5mg/3mL nebulizer solution 3 mL   D/C Amino acid 5% - dextrose 15% (CLINIMIX-E) solution with additives. CENTRAL LINE ONLY (1395 mOsm/L). 1L provides 50 gm AA, 150 gm CHO (510 kcal/L dextrose), Na 35, K 30, Mg 5, Ca 4.5, Acetate 80, Cl 39, Phos 15.    D/C famotidine (PF) injection 20 mg   D/C heparin (porcine) injection 5,000 Units   D/C morphine injection 2 mg   D/C multivit-mins-ferrous gluconat 9 mg iron/15 mL Liqd 15 mL   D/C ondansetron injection 4 mg   D/C sodium chloride 0.9% flush 10 mL   D/C sodium chloride 0.9% flush 10 mL       Learners of pharmacy medication education included:  Patient    Patient +/- learner response:  Verbalized Understanding, Teachback

## 2017-12-30 NOTE — PLAN OF CARE
Problem: Patient Care Overview  Goal: Plan of Care Review  Outcome: Ongoing (interventions implemented as appropriate)  AAOX3.  Respirations even and unlabored; breath sounds clear with diminished bases and right side breath sounds absent (pneumothorax).  RA with saturation %.  Denies pain, n/v, and sob.  Abd soft, non-distended with hyperactive bowel sounds.  R LQ ileostomy hooked to de jesus bag d/t copious liquid stool.  Right upper chest with old chest tube sight pink, without redness, swelling, or drainage noted; cleaned with sterile water and mepilex applied.  Tolerating clear diet.  Fall precaution sign on door; fall precaution armband on.  Bed alarm on; daughter at bedside.  Instructed to call for assistance. Will cont to monitor.

## 2017-12-30 NOTE — ASSESSMENT & PLAN NOTE
Plan for TPN as outpatient and soft/liquid diet only for comfort. Will re-eval weekly with nutrition labs and cmp. Once he improves we may be able to introduce diet again. He is extremely high risk for surgery now, not sure if this is a dietary issue at home.

## 2017-12-30 NOTE — SUBJECTIVE & OBJECTIVE
Interval History:   luz marina clears with ostomy output      Medications:  Continuous Infusions:   Amino acid 5% - dextrose 15% (CLINIMIX-E) solution with additives (1L  provides 510 kcal/L dextrose, with 50 gm AA, 150 gm CHO, Na 35, K 30, Mg 5, Ca 4.5, Acetate 80, Cl 39, Phos 15)      Amino acid 5% - dextrose 15% (CLINIMIX-E) solution with additives (1L  provides 510 kcal/L dextrose, with 50 gm AA, 150 gm CHO, Na 35, K 30, Mg 5, Ca 4.5, Acetate 80, Cl 39, Phos 15) 75 mL/hr at 12/29/17 1834     Scheduled Meds:   famotidine (PF)  20 mg Intravenous Daily    heparin (porcine)  5,000 Units Subcutaneous Q8H    multivit-mins-ferrous gluconat  15 mL Oral Every Tues, Thurs, Sat, Sun    sodium chloride 0.9%  10 mL Intravenous Q6H     PRN Meds:albuterol-ipratropium 2.5mg-0.5mg/3mL, ondansetron, Flushing PICC Protocol **AND** sodium chloride 0.9% **AND** sodium chloride 0.9%     Review of patient's allergies indicates:  No Known Allergies  Objective:     Vital Signs (Most Recent):  Temp: 98 °F (36.7 °C) (12/30/17 0749)  Pulse: 94 (12/30/17 0749)  Resp: 15 (12/30/17 0749)  BP: 113/60 (12/30/17 0749)  SpO2: 95 % (12/30/17 0857) Vital Signs (24h Range):  Temp:  [96.7 °F (35.9 °C)-98.5 °F (36.9 °C)] 98 °F (36.7 °C)  Pulse:  [] 94  Resp:  [15-18] 15  SpO2:  [95 %-97 %] 95 %  BP: ()/(49-62) 113/60     Weight: 44.8 kg (98 lb 12.3 oz)  Body mass index is 15.47 kg/m².    Intake/Output - Last 3 Shifts       12/28 0700 - 12/29 0659 12/29 0700 - 12/30 0659 12/30 0700 - 12/31 0659    P.O.  320 150    I.V. (mL/kg) 335.4 (7.7) 445 (9.9)     TPN  2001.3     Total Intake(mL/kg) 335.4 (7.7) 2766.3 (61.7) 150 (3.3)    Urine (mL/kg/hr) 606 1400 (1.3) 100 (0.5)    Drains 100      Stool 600 1560 (1.5)     Total Output 1306 2960 100    Net -970.6 -193.8 +50                 Physical Exam  Emaciated  Ostomy prodcutive stool and gas    Significant Labs:  CBC:   Recent Labs  Lab 12/29/17  0620   WBC 8.56   RBC 3.21*   HGB 9.8*   HCT 30.4*    *   MCV 95   MCH 30.5   MCHC 32.2     BMP:   Recent Labs  Lab 12/30/17  0555   GLU 90      K 4.1      CO2 27   BUN 23   CREATININE 0.7   CALCIUM 8.8   MG 1.9       Significant Diagnostics:  I have reviewed and interpreted all pertinent imaging results/findings within the past 24 hours.

## 2017-12-30 NOTE — PROGRESS NOTES
TN received call from [facility], he stated that per his manager, since they are unable to confirm the insurance authorization with N due to offices being closed, they will not be able to bring the TPN for the the pt to discharge until Tuesday when the [facility] offices reopen.     TN informed the pt and his daughter, Dr. GLENDY Arciniega and pt's floor nurse Cintia.

## 2017-12-30 NOTE — PROGRESS NOTES
[facility]  General Surgery  Progress Note    Subjective:     History of Present Illness:  Moises Hernandez is a 82 y.o. male very well known to general surgery service with history of chronic right pneumothorax (at least since May 2017), COPD, and intermittent bowel obstructions who presents for recurrent abdominal pain/bloating, nausea, and vomiting.  Symptoms began ~30 hours ago.  He has had no ostomy output since onset of symptoms.  This is his 4th readmission for the same problem this month.  He has to this point declined TPN and his obstructions have resolved each time without operative intervention.  He reports that over the past week, he had been doing very well.  He was ambulating well around the house and garden, he has been eating 3 good meals a day and supplementing with protein shakes.  His only hiccup last week was that his chest tube with Heimlich valve was accidentally removed at home on Christmas Eve.  He came to the ED to be evaluated and a CXR was stable, so no chest tube was replaced.  He denies fever, chills, chest pain, shortness of breath, blood in stool, bilious/bloody emesis.  He vomited water a couple times yesterday.    Post-Op Info:  * No surgery found *         Interval History:   luz marina clears with ostomy output      Medications:  Continuous Infusions:   Amino acid 5% - dextrose 15% (CLINIMIX-E) solution with additives (1L  provides 510 kcal/L dextrose, with 50 gm AA, 150 gm CHO, Na 35, K 30, Mg 5, Ca 4.5, Acetate 80, Cl 39, Phos 15)      Amino acid 5% - dextrose 15% (CLINIMIX-E) solution with additives (1L  provides 510 kcal/L dextrose, with 50 gm AA, 150 gm CHO, Na 35, K 30, Mg 5, Ca 4.5, Acetate 80, Cl 39, Phos 15) 75 mL/hr at 12/29/17 2384     Scheduled Meds:   famotidine (PF)  20 mg Intravenous Daily    heparin (porcine)  5,000 Units Subcutaneous Q8H    multivit-mins-ferrous gluconat  15 mL Oral Every Tues, Thurs, Sat, Sun    sodium chloride 0.9%  10 mL  Intravenous Q6H     PRN Meds:albuterol-ipratropium 2.5mg-0.5mg/3mL, ondansetron, Flushing PICC Protocol **AND** sodium chloride 0.9% **AND** sodium chloride 0.9%     Review of patient's allergies indicates:  No Known Allergies  Objective:     Vital Signs (Most Recent):  Temp: 98 °F (36.7 °C) (12/30/17 0749)  Pulse: 94 (12/30/17 0749)  Resp: 15 (12/30/17 0749)  BP: 113/60 (12/30/17 0749)  SpO2: 95 % (12/30/17 0857) Vital Signs (24h Range):  Temp:  [96.7 °F (35.9 °C)-98.5 °F (36.9 °C)] 98 °F (36.7 °C)  Pulse:  [] 94  Resp:  [15-18] 15  SpO2:  [95 %-97 %] 95 %  BP: ()/(49-62) 113/60     Weight: 44.8 kg (98 lb 12.3 oz)  Body mass index is 15.47 kg/m².    Intake/Output - Last 3 Shifts       12/28 0700 - 12/29 0659 12/29 0700 - 12/30 0659 12/30 0700 - 12/31 0659    P.O.  320 150    I.V. (mL/kg) 335.4 (7.7) 445 (9.9)     TPN  2001.3     Total Intake(mL/kg) 335.4 (7.7) 2766.3 (61.7) 150 (3.3)    Urine (mL/kg/hr) 606 1400 (1.3) 100 (0.5)    Drains 100      Stool 600 1560 (1.5)     Total Output 1306 2960 100    Net -970.6 -193.8 +50                 Physical Exam  Emaciated  Ostomy prodcutive stool and gas    Significant Labs:  CBC:   Recent Labs  Lab 12/29/17  0620   WBC 8.56   RBC 3.21*   HGB 9.8*   HCT 30.4*   *   MCV 95   MCH 30.5   MCHC 32.2     BMP:   Recent Labs  Lab 12/30/17  0555   GLU 90      K 4.1      CO2 27   BUN 23   CREATININE 0.7   CALCIUM 8.8   MG 1.9       Significant Diagnostics:  I have reviewed and interpreted all pertinent imaging results/findings within the past 24 hours.    Assessment/Plan:     SBO (small bowel obstruction)    Plan for TPN as outpatient and soft/liquid diet only for comfort. Will re-eval weekly with nutrition labs and cmp. Once he improves we may be able to introduce diet again. He is extremely high risk for surgery now, not sure if this is a dietary issue at home.         Failure to thrive in adult    -Long discussion with patient and daughter; in the  past they have declined TPN; however, given that this is his 4th episode of SBO in 1 month, we strongly advised parenteral nutrition; and they both agreed  -TPN  -nutrition labs every Monday/Friday        Hyponatremia    -Resolved  -Will monitor on TPN        Chronic pneumothorax    -Chest tube accidentally removed Jason Dale without consequence  -He is on room air and hemodynamically stable  -No need for a new chest tube        Abdominal pain    -PRN morphine        COPD (chronic obstructive pulmonary disease) with acute bronchitis    -PRN Duo-nebs  -Patient on room air at home and doing well on room air as inpatient            Theodora Arciniega MD  General Surgery  [facility]

## 2017-12-31 NOTE — SUBJECTIVE & OBJECTIVE
Interval History:   luz marina diet  Ostomy output 1L    Medications:  Continuous Infusions:   Amino acid 5% - dextrose 15% (CLINIMIX-E) solution with additives (1L  provides 510 kcal/L dextrose, with 50 gm AA, 150 gm CHO, Na 35, K 30, Mg 5, Ca 4.5, Acetate 80, Cl 39, Phos 15) 75 mL/hr at 12/30/17 2010     Scheduled Meds:   famotidine (PF)  20 mg Intravenous Daily    heparin (porcine)  5,000 Units Subcutaneous Q8H    sodium chloride 0.9%  10 mL Intravenous Q6H    traZODone  50 mg Oral QHS     PRN Meds:albuterol-ipratropium 2.5mg-0.5mg/3mL, ondansetron, Flushing PICC Protocol **AND** sodium chloride 0.9% **AND** sodium chloride 0.9%     Review of patient's allergies indicates:  No Known Allergies  Objective:     Vital Signs (Most Recent):  Temp: 97.8 °F (36.6 °C) (12/31/17 1218)  Pulse: 87 (12/31/17 1218)  Resp: 18 (12/31/17 1218)  BP: (!) 106/57 (12/31/17 1218)  SpO2: 95 % (12/31/17 1154) Vital Signs (24h Range):  Temp:  [96.9 °F (36.1 °C)-99.2 °F (37.3 °C)] 97.8 °F (36.6 °C)  Pulse:  [] 87  Resp:  [17-19] 18  SpO2:  [95 %-98 %] 95 %  BP: (106-132)/(55-71) 106/57     Weight: 43.8 kg (96 lb 9 oz)  Body mass index is 15.12 kg/m².    Intake/Output - Last 3 Shifts       12/29 0700 - 12/30 0659 12/30 0700 - 12/31 0659 12/31 0700 - 01/01 0659    P.O. 320 450     I.V. (mL/kg) 445 (9.9)      TPN 2001.3 876.3     Total Intake(mL/kg) 2766.3 (61.7) 1326.3 (30.3)     Urine (mL/kg/hr) 1400 (1.3) 750 (0.7) 100 (0.4)    Drains       Stool 1560 (1.5) 850 (0.8) 850 (3.6)    Total Output 2960 1600 950    Net -193.8 -273.8 -950                 Physical Exam  Soft nt nd  Ostomy functioning    Significant Labs:  CBC:   Recent Labs  Lab 12/29/17  0620   WBC 8.56   RBC 3.21*   HGB 9.8*   HCT 30.4*   *   MCV 95   MCH 30.5   MCHC 32.2     BMP:   Recent Labs  Lab 12/31/17  0545   GLU 97      K 4.3      CO2 28   BUN 24*   CREATININE 0.7   CALCIUM 9.1   MG 1.9       Significant Diagnostics:  I have reviewed and  interpreted all pertinent imaging results/findings within the past 24 hours.

## 2017-12-31 NOTE — PROGRESS NOTES
[facility]  General Surgery  Progress Note    Subjective:     History of Present Illness:  Moises Hernandez is a 82 y.o. male very well known to general surgery service with history of chronic right pneumothorax (at least since May 2017), COPD, and intermittent bowel obstructions who presents for recurrent abdominal pain/bloating, nausea, and vomiting.  Symptoms began ~30 hours ago.  He has had no ostomy output since onset of symptoms.  This is his 4th readmission for the same problem this month.  He has to this point declined TPN and his obstructions have resolved each time without operative intervention.  He reports that over the past week, he had been doing very well.  He was ambulating well around the house and garden, he has been eating 3 good meals a day and supplementing with protein shakes.  His only hiccup last week was that his chest tube with Heimlich valve was accidentally removed at home on Christmas Eve.  He came to the ED to be evaluated and a CXR was stable, so no chest tube was replaced.  He denies fever, chills, chest pain, shortness of breath, blood in stool, bilious/bloody emesis.  He vomited water a couple times yesterday.    Post-Op Info:  * No surgery found *         Interval History:   luz marina diet  Ostomy output 1L    Medications:  Continuous Infusions:   Amino acid 5% - dextrose 15% (CLINIMIX-E) solution with additives (1L  provides 510 kcal/L dextrose, with 50 gm AA, 150 gm CHO, Na 35, K 30, Mg 5, Ca 4.5, Acetate 80, Cl 39, Phos 15) 75 mL/hr at 12/30/17 2010     Scheduled Meds:   famotidine (PF)  20 mg Intravenous Daily    heparin (porcine)  5,000 Units Subcutaneous Q8H    sodium chloride 0.9%  10 mL Intravenous Q6H    traZODone  50 mg Oral QHS     PRN Meds:albuterol-ipratropium 2.5mg-0.5mg/3mL, ondansetron, Flushing PICC Protocol **AND** sodium chloride 0.9% **AND** sodium chloride 0.9%     Review of patient's allergies indicates:  No Known Allergies  Objective:      Vital Signs (Most Recent):  Temp: 97.8 °F (36.6 °C) (12/31/17 1218)  Pulse: 87 (12/31/17 1218)  Resp: 18 (12/31/17 1218)  BP: (!) 106/57 (12/31/17 1218)  SpO2: 95 % (12/31/17 1154) Vital Signs (24h Range):  Temp:  [96.9 °F (36.1 °C)-99.2 °F (37.3 °C)] 97.8 °F (36.6 °C)  Pulse:  [] 87  Resp:  [17-19] 18  SpO2:  [95 %-98 %] 95 %  BP: (106-132)/(55-71) 106/57     Weight: 43.8 kg (96 lb 9 oz)  Body mass index is 15.12 kg/m².    Intake/Output - Last 3 Shifts       12/29 0700 - 12/30 0659 12/30 0700 - 12/31 0659 12/31 0700 - 01/01 0659    P.O. 320 450     I.V. (mL/kg) 445 (9.9)      TPN 2001.3 876.3     Total Intake(mL/kg) 2766.3 (61.7) 1326.3 (30.3)     Urine (mL/kg/hr) 1400 (1.3) 750 (0.7) 100 (0.4)    Drains       Stool 1560 (1.5) 850 (0.8) 850 (3.6)    Total Output 2960 1600 950    Net -193.8 -273.8 -950                 Physical Exam  Soft nt nd  Ostomy functioning    Significant Labs:  CBC:   Recent Labs  Lab 12/29/17  0620   WBC 8.56   RBC 3.21*   HGB 9.8*   HCT 30.4*   *   MCV 95   MCH 30.5   MCHC 32.2     BMP:   Recent Labs  Lab 12/31/17  0545   GLU 97      K 4.3      CO2 28   BUN 24*   CREATININE 0.7   CALCIUM 9.1   MG 1.9       Significant Diagnostics:  I have reviewed and interpreted all pertinent imaging results/findings within the past 24 hours.    Assessment/Plan:     * SBO (small bowel obstruction)    Plan for TPN as outpatient and soft/liquid diet only for comfort. Will re-eval weekly with nutrition labs and cmp. Once he improves we may be able to introduce diet again. He is extremely high risk for surgery now, not sure if this is a dietary issue at home.     Cannot go home w tpn until Tuesday, continue current treatment.         Failure to thrive in adult    -Long discussion with patient and daughter; in the past they have declined TPN; however, given that this is his 4th episode of SBO in 1 month, we strongly advised parenteral nutrition; and they both  agreed  -TPN  -nutrition labs every Monday/Friday        Hyponatremia    -Resolved  -Will monitor on TPN        Chronic pneumothorax    -Chest tube accidentally removed Jason Dale without consequence  -He is on room air and hemodynamically stable  -No need for a new chest tube        Abdominal pain    -PRN morphine        COPD (chronic obstructive pulmonary disease) with acute bronchitis    -PRN Duo-nebs  -Patient on room air at home and doing well on room air as inpatient            Theodora Arciniega MD  General Surgery  [facility]

## 2017-12-31 NOTE — PLAN OF CARE
Problem: Patient Care Overview  Goal: Plan of Care Review  Outcome: Ongoing (interventions implemented as appropriate)  Pt is AAOx4, denies pain, no n/v. Pt with iliostomy bag, connected to a de jesus bag. New bag placed this shift. Pt receiving TPN at 75 ml/hr. Pt with difficulty sleeping, MD notified, new order given for trazodone..

## 2017-12-31 NOTE — ASSESSMENT & PLAN NOTE
Plan for TPN as outpatient and soft/liquid diet only for comfort. Will re-eval weekly with nutrition labs and cmp. Once he improves we may be able to introduce diet again. He is extremely high risk for surgery now, not sure if this is a dietary issue at home.     Cannot go home w tpn until Tuesday, continue current treatment.

## 2018-01-01 ENCOUNTER — TELEPHONE (OUTPATIENT)
Dept: SURGERY | Facility: CLINIC | Age: 83
End: 2018-01-01

## 2018-01-01 ENCOUNTER — ANESTHESIA (OUTPATIENT)
Dept: INTENSIVE CARE | Facility: HOSPITAL | Age: 83
DRG: 871 | End: 2018-01-01
Payer: MEDICARE

## 2018-01-01 ENCOUNTER — ANESTHESIA EVENT (OUTPATIENT)
Dept: INTENSIVE CARE | Facility: HOSPITAL | Age: 83
DRG: 871 | End: 2018-01-01
Payer: MEDICARE

## 2018-01-01 ENCOUNTER — HOSPITAL ENCOUNTER (INPATIENT)
Facility: HOSPITAL | Age: 83
LOS: 2 days | DRG: 871 | End: 2018-01-19
Attending: EMERGENCY MEDICINE | Admitting: INTERNAL MEDICINE
Payer: MEDICARE

## 2018-01-01 ENCOUNTER — PATIENT OUTREACH (OUTPATIENT)
Dept: ADMINISTRATIVE | Facility: CLINIC | Age: 83
End: 2018-01-01

## 2018-01-01 VITALS
RESPIRATION RATE: 16 BRPM | BODY MASS INDEX: 15.16 KG/M2 | OXYGEN SATURATION: 99 % | HEART RATE: 89 BPM | TEMPERATURE: 98 F | DIASTOLIC BLOOD PRESSURE: 59 MMHG | SYSTOLIC BLOOD PRESSURE: 101 MMHG | HEIGHT: 67 IN | WEIGHT: 96.56 LBS

## 2018-01-01 VITALS
BODY MASS INDEX: 16.13 KG/M2 | TEMPERATURE: 98 F | HEIGHT: 67 IN | RESPIRATION RATE: 27 BRPM | OXYGEN SATURATION: 78 % | HEART RATE: 27 BPM | DIASTOLIC BLOOD PRESSURE: 28 MMHG | SYSTOLIC BLOOD PRESSURE: 52 MMHG | WEIGHT: 102.75 LBS

## 2018-01-01 DIAGNOSIS — I21.4 NSTEMI (NON-ST ELEVATED MYOCARDIAL INFARCTION): ICD-10-CM

## 2018-01-01 DIAGNOSIS — R41.82 ALTERED MENTAL STATUS, UNSPECIFIED ALTERED MENTAL STATUS TYPE: ICD-10-CM

## 2018-01-01 DIAGNOSIS — R41.82 ALTERED MENTAL STATUS: ICD-10-CM

## 2018-01-01 DIAGNOSIS — R65.20 SEVERE SEPSIS: ICD-10-CM

## 2018-01-01 DIAGNOSIS — E87.1 HYPONATREMIA WITH EXTRACELLULAR FLUID DEPLETION: ICD-10-CM

## 2018-01-01 DIAGNOSIS — E87.20 METABOLIC ACIDOSIS, NORMAL ANION GAP (NAG): ICD-10-CM

## 2018-01-01 DIAGNOSIS — J10.1 INFLUENZA A: ICD-10-CM

## 2018-01-01 DIAGNOSIS — I49.9 ABNORMAL HEART RHYTHM: ICD-10-CM

## 2018-01-01 DIAGNOSIS — B99.9 INFECTIOUS ENCEPHALOPATHY: ICD-10-CM

## 2018-01-01 DIAGNOSIS — G93.49 INFECTIOUS ENCEPHALOPATHY: ICD-10-CM

## 2018-01-01 DIAGNOSIS — D63.8 ANEMIA, CHRONIC DISEASE: ICD-10-CM

## 2018-01-01 DIAGNOSIS — R78.81 BACTEREMIA: ICD-10-CM

## 2018-01-01 DIAGNOSIS — R74.01 TRANSAMINITIS: ICD-10-CM

## 2018-01-01 DIAGNOSIS — A41.9 SEVERE SEPSIS: ICD-10-CM

## 2018-01-01 DIAGNOSIS — B97.89 SEPSIS, VIRAL: ICD-10-CM

## 2018-01-01 DIAGNOSIS — E86.9 VOLUME DEPLETION: ICD-10-CM

## 2018-01-01 DIAGNOSIS — Z90.49 S/P TOTAL COLECTOMY: ICD-10-CM

## 2018-01-01 DIAGNOSIS — R78.81 STAPHYLOCOCCUS AUREUS BACTEREMIA: ICD-10-CM

## 2018-01-01 DIAGNOSIS — R78.81 GRAM-POSITIVE COCCI BACTEREMIA: ICD-10-CM

## 2018-01-01 DIAGNOSIS — R65.21 SEPTIC SHOCK: ICD-10-CM

## 2018-01-01 DIAGNOSIS — J18.9 MULTIFOCAL PNEUMONIA: ICD-10-CM

## 2018-01-01 DIAGNOSIS — A41.9 SEPSIS, DUE TO UNSPECIFIED ORGANISM: Primary | ICD-10-CM

## 2018-01-01 DIAGNOSIS — N17.9 ACUTE KIDNEY INJURY SUPERIMPOSED ON CKD: ICD-10-CM

## 2018-01-01 DIAGNOSIS — R63.0 DECREASED APPETITE: ICD-10-CM

## 2018-01-01 DIAGNOSIS — B95.61 STAPHYLOCOCCUS AUREUS BACTEREMIA: ICD-10-CM

## 2018-01-01 DIAGNOSIS — J96.01 ACUTE RESPIRATORY FAILURE WITH HYPOXIA: ICD-10-CM

## 2018-01-01 DIAGNOSIS — A41.9 SEPTIC SHOCK: ICD-10-CM

## 2018-01-01 DIAGNOSIS — J44.0 COPD (CHRONIC OBSTRUCTIVE PULMONARY DISEASE) WITH ACUTE BRONCHITIS: ICD-10-CM

## 2018-01-01 DIAGNOSIS — R79.89 ELEVATED TROPONIN: ICD-10-CM

## 2018-01-01 DIAGNOSIS — E46 PROTEIN-CALORIE MALNUTRITION, UNSPECIFIED SEVERITY: ICD-10-CM

## 2018-01-01 DIAGNOSIS — E87.1 HYPONATREMIA: ICD-10-CM

## 2018-01-01 DIAGNOSIS — A41.89 SEPSIS, VIRAL: ICD-10-CM

## 2018-01-01 DIAGNOSIS — R53.81 PHYSICAL DECONDITIONING: ICD-10-CM

## 2018-01-01 DIAGNOSIS — N18.9 ACUTE KIDNEY INJURY SUPERIMPOSED ON CKD: ICD-10-CM

## 2018-01-01 DIAGNOSIS — D64.9 ANEMIA, UNSPECIFIED TYPE: ICD-10-CM

## 2018-01-01 DIAGNOSIS — J20.9 COPD (CHRONIC OBSTRUCTIVE PULMONARY DISEASE) WITH ACUTE BRONCHITIS: ICD-10-CM

## 2018-01-01 DIAGNOSIS — J93.81 CHRONIC PNEUMOTHORAX: ICD-10-CM

## 2018-01-01 DIAGNOSIS — E44.0 MALNUTRITION OF MODERATE DEGREE: ICD-10-CM

## 2018-01-01 LAB
ALBUMIN SERPL BCP-MCNC: 2.2 G/DL
ALBUMIN SERPL BCP-MCNC: 2.2 G/DL
ALBUMIN SERPL BCP-MCNC: 2.6 G/DL
ALBUMIN SERPL BCP-MCNC: 2.9 G/DL
ALLENS TEST: ABNORMAL
ALP SERPL-CCNC: 100 U/L
ALP SERPL-CCNC: 101 U/L
ALP SERPL-CCNC: 115 U/L
ALP SERPL-CCNC: 79 U/L
ALT SERPL W/O P-5'-P-CCNC: 12 U/L
ALT SERPL W/O P-5'-P-CCNC: 33 U/L
ALT SERPL W/O P-5'-P-CCNC: 57 U/L
ALT SERPL W/O P-5'-P-CCNC: 71 U/L
ANION GAP SERPL CALC-SCNC: 14 MMOL/L
ANION GAP SERPL CALC-SCNC: 14 MMOL/L
ANION GAP SERPL CALC-SCNC: 16 MMOL/L
ANION GAP SERPL CALC-SCNC: 17 MMOL/L
ANION GAP SERPL CALC-SCNC: 5 MMOL/L
ANISOCYTOSIS BLD QL SMEAR: SLIGHT
ANISOCYTOSIS BLD QL SMEAR: SLIGHT
APTT BLDCRRT: 37.8 SEC
AST SERPL-CCNC: 175 U/L
AST SERPL-CCNC: 19 U/L
AST SERPL-CCNC: 191 U/L
AST SERPL-CCNC: 76 U/L
BASOPHILS # BLD AUTO: 0.02 K/UL
BASOPHILS # BLD AUTO: ABNORMAL K/UL
BASOPHILS NFR BLD: 0 %
BASOPHILS NFR BLD: 0 %
BASOPHILS NFR BLD: 0.1 %
BILIRUB SERPL-MCNC: 0.3 MG/DL
BILIRUB SERPL-MCNC: 0.6 MG/DL
BILIRUB SERPL-MCNC: 1.1 MG/DL
BILIRUB SERPL-MCNC: 1.5 MG/DL
BILIRUB UR QL STRIP: NEGATIVE
BNP SERPL-MCNC: 715 PG/ML
BUN SERPL-MCNC: 24 MG/DL
BUN SERPL-MCNC: 24 MG/DL
BUN SERPL-MCNC: 26 MG/DL
BUN SERPL-MCNC: 72 MG/DL
BUN SERPL-MCNC: 76 MG/DL
BUN SERPL-MCNC: 86 MG/DL
BUN SERPL-MCNC: 92 MG/DL
BURR CELLS BLD QL SMEAR: ABNORMAL
CALCIUM SERPL-MCNC: 8.3 MG/DL
CALCIUM SERPL-MCNC: 8.4 MG/DL
CALCIUM SERPL-MCNC: 8.7 MG/DL
CALCIUM SERPL-MCNC: 9 MG/DL
CALCIUM SERPL-MCNC: 9 MG/DL
CALCIUM SERPL-MCNC: 9.3 MG/DL
CALCIUM SERPL-MCNC: 9.4 MG/DL
CHLORIDE SERPL-SCNC: 103 MMOL/L
CHLORIDE SERPL-SCNC: 104 MMOL/L
CHLORIDE SERPL-SCNC: 104 MMOL/L
CHLORIDE SERPL-SCNC: 88 MMOL/L
CHLORIDE SERPL-SCNC: 91 MMOL/L
CHLORIDE SERPL-SCNC: 92 MMOL/L
CHLORIDE SERPL-SCNC: 93 MMOL/L
CLARITY UR: CLEAR
CO2 SERPL-SCNC: 17 MMOL/L
CO2 SERPL-SCNC: 17 MMOL/L
CO2 SERPL-SCNC: 18 MMOL/L
CO2 SERPL-SCNC: 18 MMOL/L
CO2 SERPL-SCNC: 28 MMOL/L
CO2 SERPL-SCNC: 28 MMOL/L
CO2 SERPL-SCNC: 29 MMOL/L
COLOR UR: YELLOW
CORTIS SERPL-MCNC: 66 UG/DL
CREAT SERPL-MCNC: 0.7 MG/DL
CREAT SERPL-MCNC: 1.3 MG/DL
CREAT SERPL-MCNC: 1.4 MG/DL
CREAT SERPL-MCNC: 2.2 MG/DL
CREAT SERPL-MCNC: 2.6 MG/DL
DACRYOCYTES BLD QL SMEAR: ABNORMAL
DELSYS: ABNORMAL
DIASTOLIC DYSFUNCTION: YES
DIFFERENTIAL METHOD: ABNORMAL
EOSINOPHIL # BLD AUTO: 0 K/UL
EOSINOPHIL # BLD AUTO: ABNORMAL K/UL
EOSINOPHIL NFR BLD: 0 %
EOSINOPHIL NFR BLD: 0.1 %
EOSINOPHIL NFR BLD: 2 %
ERYTHROCYTE [DISTWIDTH] IN BLOOD BY AUTOMATED COUNT: 12.8 %
ERYTHROCYTE [DISTWIDTH] IN BLOOD BY AUTOMATED COUNT: 13.4 %
ERYTHROCYTE [DISTWIDTH] IN BLOOD BY AUTOMATED COUNT: 13.8 %
EST. GFR  (AFRICAN AMERICAN): 26 ML/MIN/1.73 M^2
EST. GFR  (AFRICAN AMERICAN): 32 ML/MIN/1.73 M^2
EST. GFR  (AFRICAN AMERICAN): 54 ML/MIN/1.73 M^2
EST. GFR  (AFRICAN AMERICAN): 60 ML/MIN/1.73 M^2
EST. GFR  (AFRICAN AMERICAN): >60 ML/MIN/1.73 M^2
EST. GFR  (NON AFRICAN AMERICAN): 22 ML/MIN/1.73 M^2
EST. GFR  (NON AFRICAN AMERICAN): 27 ML/MIN/1.73 M^2
EST. GFR  (NON AFRICAN AMERICAN): 47 ML/MIN/1.73 M^2
EST. GFR  (NON AFRICAN AMERICAN): 52 ML/MIN/1.73 M^2
EST. GFR  (NON AFRICAN AMERICAN): >60 ML/MIN/1.73 M^2
ESTIMATED PA SYSTOLIC PRESSURE: 32.8
FLUAV AG SPEC QL IA: POSITIVE
FLUBV AG SPEC QL IA: NEGATIVE
GIANT PLATELETS BLD QL SMEAR: PRESENT
GIANT PLATELETS BLD QL SMEAR: PRESENT
GLUCOSE SERPL-MCNC: 106 MG/DL
GLUCOSE SERPL-MCNC: 128 MG/DL
GLUCOSE SERPL-MCNC: 79 MG/DL
GLUCOSE SERPL-MCNC: 94 MG/DL
GLUCOSE SERPL-MCNC: 97 MG/DL
GLUCOSE SERPL-MCNC: 97 MG/DL
GLUCOSE SERPL-MCNC: 99 MG/DL
GLUCOSE UR QL STRIP: NEGATIVE
HCO3 UR-SCNC: 13.6 MMOL/L (ref 24–28)
HCT VFR BLD AUTO: 29.7 %
HCT VFR BLD AUTO: 31 %
HCT VFR BLD AUTO: 35.3 %
HGB BLD-MCNC: 10.3 G/DL
HGB BLD-MCNC: 10.7 G/DL
HGB BLD-MCNC: 12.3 G/DL
HGB UR QL STRIP: NEGATIVE
HYPOCHROMIA BLD QL SMEAR: ABNORMAL
HYPOCHROMIA BLD QL SMEAR: ABNORMAL
INR PPP: 1.1
KETONES UR QL STRIP: NEGATIVE
LACTATE SERPL-SCNC: 1.9 MMOL/L
LACTATE SERPL-SCNC: 3.8 MMOL/L
LEUKOCYTE ESTERASE UR QL STRIP: NEGATIVE
LIPASE SERPL-CCNC: <3 U/L
LYMPHOCYTES # BLD AUTO: 0.4 K/UL
LYMPHOCYTES # BLD AUTO: ABNORMAL K/UL
LYMPHOCYTES NFR BLD: 1.3 %
LYMPHOCYTES NFR BLD: 3 %
LYMPHOCYTES NFR BLD: 4 %
MAGNESIUM SERPL-MCNC: 1.9 MG/DL
MAGNESIUM SERPL-MCNC: 2.1 MG/DL
MAGNESIUM SERPL-MCNC: 2.2 MG/DL
MCH RBC QN AUTO: 30.8 PG
MCH RBC QN AUTO: 31.1 PG
MCH RBC QN AUTO: 31.3 PG
MCHC RBC AUTO-ENTMCNC: 34.5 G/DL
MCHC RBC AUTO-ENTMCNC: 34.7 G/DL
MCHC RBC AUTO-ENTMCNC: 34.8 G/DL
MCV RBC AUTO: 89 FL
MCV RBC AUTO: 89 FL
MCV RBC AUTO: 91 FL
MITRAL VALVE REGURGITATION: ABNORMAL
MONOCYTES # BLD AUTO: 0.5 K/UL
MONOCYTES # BLD AUTO: ABNORMAL K/UL
MONOCYTES NFR BLD: 1.5 %
MONOCYTES NFR BLD: 3 %
MONOCYTES NFR BLD: 5 %
MYELOCYTES NFR BLD MANUAL: 2 %
NEUTROPHILS # BLD AUTO: 31.5 K/UL
NEUTROPHILS NFR BLD: 56 %
NEUTROPHILS NFR BLD: 80 %
NEUTROPHILS NFR BLD: 97 %
NEUTS BAND NFR BLD MANUAL: 11 %
NEUTS BAND NFR BLD MANUAL: 34 %
NITRITE UR QL STRIP: NEGATIVE
NRBC BLD-RTO: ABNORMAL /100 WBC
OSMOLALITY UR: 381 MOSM/KG
PCO2 BLDA: 31.3 MMHG (ref 35–45)
PH SMN: 7.25 [PH] (ref 7.35–7.45)
PH UR STRIP: 6 [PH] (ref 5–8)
PHOSPHATE SERPL-MCNC: 3.6 MG/DL
PHOSPHATE SERPL-MCNC: 3.9 MG/DL
PHOSPHATE SERPL-MCNC: 4.3 MG/DL
PLATELET # BLD AUTO: 130 K/UL
PLATELET # BLD AUTO: 137 K/UL
PLATELET # BLD AUTO: 188 K/UL
PLATELET BLD QL SMEAR: ABNORMAL
PLATELET BLD QL SMEAR: ABNORMAL
PMV BLD AUTO: 13.2 FL
PMV BLD AUTO: 13.4 FL
PMV BLD AUTO: 14.3 FL
PO2 BLDA: 102 MMHG (ref 80–100)
POC BE: -14 MMOL/L
POC SATURATED O2: 97 % (ref 95–100)
POC TCO2: 15 MMOL/L (ref 23–27)
POCT GLUCOSE: 117 MG/DL (ref 70–110)
POCT GLUCOSE: 123 MG/DL (ref 70–110)
POIKILOCYTOSIS BLD QL SMEAR: SLIGHT
POLYCHROMASIA BLD QL SMEAR: ABNORMAL
POTASSIUM SERPL-SCNC: 3.8 MMOL/L
POTASSIUM SERPL-SCNC: 4 MMOL/L
POTASSIUM SERPL-SCNC: 4.2 MMOL/L
POTASSIUM SERPL-SCNC: 4.4 MMOL/L
POTASSIUM SERPL-SCNC: 4.5 MMOL/L
PREALB SERPL-MCNC: 11 MG/DL
PROCALCITONIN SERPL IA-MCNC: 7.04 NG/ML
PROT SERPL-MCNC: 5.7 G/DL
PROT SERPL-MCNC: 6 G/DL
PROT SERPL-MCNC: 6.1 G/DL
PROT SERPL-MCNC: 7.1 G/DL
PROT UR QL STRIP: NEGATIVE
PROTHROMBIN TIME: 11.3 SEC
RBC # BLD AUTO: 3.34 M/UL
RBC # BLD AUTO: 3.42 M/UL
RBC # BLD AUTO: 3.96 M/UL
RETIRED EF AND QEF - SEE NOTES: 35 (ref 55–65)
SAMPLE: ABNORMAL
SITE: ABNORMAL
SODIUM SERPL-SCNC: 119 MMOL/L
SODIUM SERPL-SCNC: 124 MMOL/L
SODIUM SERPL-SCNC: 126 MMOL/L
SODIUM SERPL-SCNC: 126 MMOL/L
SODIUM SERPL-SCNC: 137 MMOL/L
SP GR UR STRIP: 1.01 (ref 1–1.03)
SPECIMEN SOURCE: ABNORMAL
TARGETS BLD QL SMEAR: ABNORMAL
TRICUSPID VALVE REGURGITATION: ABNORMAL
TROPONIN I SERPL DL<=0.01 NG/ML-MCNC: 0.15 NG/ML
TROPONIN I SERPL DL<=0.01 NG/ML-MCNC: 0.46 NG/ML
TROPONIN I SERPL DL<=0.01 NG/ML-MCNC: 19.74 NG/ML
TSH SERPL DL<=0.005 MIU/L-ACNC: 0.74 UIU/ML
URN SPEC COLLECT METH UR: NORMAL
UROBILINOGEN UR STRIP-ACNC: NEGATIVE EU/DL
VANCOMYCIN SERPL-MCNC: 9.2 UG/ML
WBC # BLD AUTO: 16.1 K/UL
WBC # BLD AUTO: 17.94 K/UL
WBC # BLD AUTO: 32.55 K/UL

## 2018-01-01 PROCEDURE — 25000003 PHARM REV CODE 250: Performed by: INTERNAL MEDICINE

## 2018-01-01 PROCEDURE — 63600175 PHARM REV CODE 636 W HCPCS: Performed by: EMERGENCY MEDICINE

## 2018-01-01 PROCEDURE — 94003 VENT MGMT INPAT SUBQ DAY: CPT

## 2018-01-01 PROCEDURE — 96361 HYDRATE IV INFUSION ADD-ON: CPT

## 2018-01-01 PROCEDURE — 83605 ASSAY OF LACTIC ACID: CPT

## 2018-01-01 PROCEDURE — 94761 N-INVAS EAR/PLS OXIMETRY MLT: CPT

## 2018-01-01 PROCEDURE — 25000003 PHARM REV CODE 250: Performed by: STUDENT IN AN ORGANIZED HEALTH CARE EDUCATION/TRAINING PROGRAM

## 2018-01-01 PROCEDURE — 63600175 PHARM REV CODE 636 W HCPCS: Performed by: SURGERY

## 2018-01-01 PROCEDURE — 11000001 HC ACUTE MED/SURG PRIVATE ROOM

## 2018-01-01 PROCEDURE — 82803 BLOOD GASES ANY COMBINATION: CPT

## 2018-01-01 PROCEDURE — 84134 ASSAY OF PREALBUMIN: CPT

## 2018-01-01 PROCEDURE — 84100 ASSAY OF PHOSPHORUS: CPT

## 2018-01-01 PROCEDURE — 93005 ELECTROCARDIOGRAM TRACING: CPT

## 2018-01-01 PROCEDURE — 93010 ELECTROCARDIOGRAM REPORT: CPT | Mod: 76,,, | Performed by: INTERNAL MEDICINE

## 2018-01-01 PROCEDURE — 85610 PROTHROMBIN TIME: CPT

## 2018-01-01 PROCEDURE — 83690 ASSAY OF LIPASE: CPT

## 2018-01-01 PROCEDURE — 51702 INSERT TEMP BLADDER CATH: CPT

## 2018-01-01 PROCEDURE — 83880 ASSAY OF NATRIURETIC PEPTIDE: CPT

## 2018-01-01 PROCEDURE — 93010 ELECTROCARDIOGRAM REPORT: CPT | Mod: ,,, | Performed by: INTERNAL MEDICINE

## 2018-01-01 PROCEDURE — 85730 THROMBOPLASTIN TIME PARTIAL: CPT

## 2018-01-01 PROCEDURE — 27000221 HC OXYGEN, UP TO 24 HOURS

## 2018-01-01 PROCEDURE — 84145 PROCALCITONIN (PCT): CPT

## 2018-01-01 PROCEDURE — 25000003 PHARM REV CODE 250: Performed by: ANESTHESIOLOGY

## 2018-01-01 PROCEDURE — 85007 BL SMEAR W/DIFF WBC COUNT: CPT

## 2018-01-01 PROCEDURE — 93306 TTE W/DOPPLER COMPLETE: CPT | Mod: 26,,, | Performed by: INTERNAL MEDICINE

## 2018-01-01 PROCEDURE — 25000003 PHARM REV CODE 250: Performed by: EMERGENCY MEDICINE

## 2018-01-01 PROCEDURE — 25000003 PHARM REV CODE 250: Performed by: SURGERY

## 2018-01-01 PROCEDURE — 81003 URINALYSIS AUTO W/O SCOPE: CPT

## 2018-01-01 PROCEDURE — 97802 MEDICAL NUTRITION INDIV IN: CPT

## 2018-01-01 PROCEDURE — 99223 1ST HOSP IP/OBS HIGH 75: CPT | Mod: ,,, | Performed by: FAMILY MEDICINE

## 2018-01-01 PROCEDURE — 99900035 HC TECH TIME PER 15 MIN (STAT)

## 2018-01-01 PROCEDURE — 94002 VENT MGMT INPAT INIT DAY: CPT

## 2018-01-01 PROCEDURE — 25000003 PHARM REV CODE 250

## 2018-01-01 PROCEDURE — 63600175 PHARM REV CODE 636 W HCPCS: Performed by: STUDENT IN AN ORGANIZED HEALTH CARE EDUCATION/TRAINING PROGRAM

## 2018-01-01 PROCEDURE — 80053 COMPREHEN METABOLIC PANEL: CPT

## 2018-01-01 PROCEDURE — A4216 STERILE WATER/SALINE, 10 ML: HCPCS | Performed by: STUDENT IN AN ORGANIZED HEALTH CARE EDUCATION/TRAINING PROGRAM

## 2018-01-01 PROCEDURE — 96368 THER/DIAG CONCURRENT INF: CPT

## 2018-01-01 PROCEDURE — 96366 THER/PROPH/DIAG IV INF ADDON: CPT

## 2018-01-01 PROCEDURE — 93306 TTE W/DOPPLER COMPLETE: CPT

## 2018-01-01 PROCEDURE — 63600175 PHARM REV CODE 636 W HCPCS: Performed by: INTERNAL MEDICINE

## 2018-01-01 PROCEDURE — S0028 INJECTION, FAMOTIDINE, 20 MG: HCPCS | Performed by: EMERGENCY MEDICINE

## 2018-01-01 PROCEDURE — 80202 ASSAY OF VANCOMYCIN: CPT

## 2018-01-01 PROCEDURE — 36600 WITHDRAWAL OF ARTERIAL BLOOD: CPT

## 2018-01-01 PROCEDURE — 87077 CULTURE AEROBIC IDENTIFY: CPT

## 2018-01-01 PROCEDURE — 87040 BLOOD CULTURE FOR BACTERIA: CPT

## 2018-01-01 PROCEDURE — 0BH17EZ INSERTION OF ENDOTRACHEAL AIRWAY INTO TRACHEA, VIA NATURAL OR ARTIFICIAL OPENING: ICD-10-PCS | Performed by: INTERNAL MEDICINE

## 2018-01-01 PROCEDURE — 84443 ASSAY THYROID STIM HORMONE: CPT

## 2018-01-01 PROCEDURE — 87040 BLOOD CULTURE FOR BACTERIA: CPT | Mod: 59

## 2018-01-01 PROCEDURE — 36415 COLL VENOUS BLD VENIPUNCTURE: CPT

## 2018-01-01 PROCEDURE — 63600175 PHARM REV CODE 636 W HCPCS: Performed by: ANESTHESIOLOGY

## 2018-01-01 PROCEDURE — 83735 ASSAY OF MAGNESIUM: CPT

## 2018-01-01 PROCEDURE — 99232 SBSQ HOSP IP/OBS MODERATE 35: CPT | Mod: ,,, | Performed by: SURGERY

## 2018-01-01 PROCEDURE — 83935 ASSAY OF URINE OSMOLALITY: CPT

## 2018-01-01 PROCEDURE — 96365 THER/PROPH/DIAG IV INF INIT: CPT

## 2018-01-01 PROCEDURE — 82533 TOTAL CORTISOL: CPT

## 2018-01-01 PROCEDURE — 84484 ASSAY OF TROPONIN QUANT: CPT

## 2018-01-01 PROCEDURE — 83605 ASSAY OF LACTIC ACID: CPT | Mod: 91

## 2018-01-01 PROCEDURE — 99285 EMERGENCY DEPT VISIT HI MDM: CPT | Mod: 25

## 2018-01-01 PROCEDURE — 80048 BASIC METABOLIC PNL TOTAL CA: CPT

## 2018-01-01 PROCEDURE — 84484 ASSAY OF TROPONIN QUANT: CPT | Mod: 91

## 2018-01-01 PROCEDURE — 85025 COMPLETE CBC W/AUTO DIFF WBC: CPT

## 2018-01-01 PROCEDURE — 20000000 HC ICU ROOM

## 2018-01-01 PROCEDURE — 87400 INFLUENZA A/B EACH AG IA: CPT

## 2018-01-01 PROCEDURE — 87186 SC STD MICRODIL/AGAR DIL: CPT

## 2018-01-01 PROCEDURE — 5A1945Z RESPIRATORY VENTILATION, 24-96 CONSECUTIVE HOURS: ICD-10-PCS | Performed by: INTERNAL MEDICINE

## 2018-01-01 PROCEDURE — 85027 COMPLETE CBC AUTOMATED: CPT

## 2018-01-01 RX ORDER — CEFEPIME HYDROCHLORIDE 1 G/1
1 INJECTION, POWDER, FOR SOLUTION INTRAMUSCULAR; INTRAVENOUS
Status: DISCONTINUED | OUTPATIENT
Start: 2018-01-01 | End: 2018-01-01

## 2018-01-01 RX ORDER — NOREPINEPHRINE BITARTRATE/D5W 16MG/250ML
0.02 PLASTIC BAG, INJECTION (ML) INTRAVENOUS CONTINUOUS
Status: DISCONTINUED | OUTPATIENT
Start: 2018-01-01 | End: 2018-01-01

## 2018-01-01 RX ORDER — FENTANYL CITRATE 50 UG/ML
25 INJECTION, SOLUTION INTRAMUSCULAR; INTRAVENOUS
Status: DISCONTINUED | OUTPATIENT
Start: 2018-01-01 | End: 2018-01-01 | Stop reason: HOSPADM

## 2018-01-01 RX ORDER — INDOMETHACIN 25 MG/1
50 CAPSULE ORAL ONCE
Status: DISCONTINUED | OUTPATIENT
Start: 2018-01-01 | End: 2018-01-01 | Stop reason: HOSPADM

## 2018-01-01 RX ORDER — ASPIRIN 325 MG
325 TABLET ORAL ONCE
Status: COMPLETED | OUTPATIENT
Start: 2018-01-01 | End: 2018-01-01

## 2018-01-01 RX ORDER — OSELTAMIVIR PHOSPHATE 75 MG/1
75 CAPSULE ORAL 2 TIMES DAILY
Status: DISCONTINUED | OUTPATIENT
Start: 2018-01-01 | End: 2018-01-01

## 2018-01-01 RX ORDER — HYDROCODONE BITARTRATE AND ACETAMINOPHEN 5; 325 MG/1; MG/1
1 TABLET ORAL EVERY 6 HOURS PRN
Qty: 30 TABLET | Refills: 0 | Status: SHIPPED | OUTPATIENT
Start: 2018-01-01

## 2018-01-01 RX ORDER — SODIUM CHLORIDE 9 MG/ML
INJECTION, SOLUTION INTRAVENOUS CONTINUOUS
Status: DISCONTINUED | OUTPATIENT
Start: 2018-01-01 | End: 2018-01-01

## 2018-01-01 RX ORDER — SODIUM CHLORIDE 9 MG/ML
500 INJECTION, SOLUTION INTRAVENOUS
Status: COMPLETED | OUTPATIENT
Start: 2018-01-01 | End: 2018-01-01

## 2018-01-01 RX ORDER — CEFEPIME HYDROCHLORIDE 2 G/50ML
2 INJECTION, SOLUTION INTRAVENOUS
Status: COMPLETED | OUTPATIENT
Start: 2018-01-01 | End: 2018-01-01

## 2018-01-01 RX ORDER — CIPROFLOXACIN 2 MG/ML
400 INJECTION, SOLUTION INTRAVENOUS
Status: ACTIVE | OUTPATIENT
Start: 2018-01-01 | End: 2018-01-01

## 2018-01-01 RX ORDER — OSELTAMIVIR PHOSPHATE 30 MG/1
30 CAPSULE ORAL DAILY
Status: DISCONTINUED | OUTPATIENT
Start: 2018-01-01 | End: 2018-01-01

## 2018-01-01 RX ORDER — FENTANYL CITRATE 50 UG/ML
100 INJECTION, SOLUTION INTRAMUSCULAR; INTRAVENOUS ONCE
Status: DISCONTINUED | OUTPATIENT
Start: 2018-01-01 | End: 2018-01-01 | Stop reason: HOSPADM

## 2018-01-01 RX ORDER — HEPARIN SODIUM 5000 [USP'U]/ML
5000 INJECTION, SOLUTION INTRAVENOUS; SUBCUTANEOUS EVERY 8 HOURS
Status: DISCONTINUED | OUTPATIENT
Start: 2018-01-01 | End: 2018-01-01

## 2018-01-01 RX ORDER — ATORVASTATIN CALCIUM 40 MG/1
40 TABLET, FILM COATED ORAL DAILY
Status: DISCONTINUED | OUTPATIENT
Start: 2018-01-01 | End: 2018-01-01

## 2018-01-01 RX ORDER — CEFEPIME HYDROCHLORIDE 1 G/50ML
1 INJECTION, SOLUTION INTRAVENOUS
Status: DISCONTINUED | OUTPATIENT
Start: 2018-01-01 | End: 2018-01-01

## 2018-01-01 RX ORDER — SODIUM BICARBONATE 1 MEQ/ML
SYRINGE (ML) INTRAVENOUS
Status: COMPLETED
Start: 2018-01-01 | End: 2018-01-01

## 2018-01-01 RX ORDER — FENTANYL CITRATE 50 UG/ML
50 INJECTION, SOLUTION INTRAMUSCULAR; INTRAVENOUS ONCE
Status: DISCONTINUED | OUTPATIENT
Start: 2018-01-01 | End: 2018-01-01

## 2018-01-01 RX ORDER — PROPOFOL 10 MG/ML
5 INJECTION, EMULSION INTRAVENOUS CONTINUOUS
Status: DISCONTINUED | OUTPATIENT
Start: 2018-01-01 | End: 2018-01-01 | Stop reason: HOSPADM

## 2018-01-01 RX ORDER — SUCCINYLCHOLINE CHLORIDE 20 MG/ML
INJECTION INTRAMUSCULAR; INTRAVENOUS
Status: DISCONTINUED | OUTPATIENT
Start: 2018-01-01 | End: 2018-01-01 | Stop reason: HOSPADM

## 2018-01-01 RX ORDER — HYDROMORPHONE HYDROCHLORIDE 2 MG/ML
0.2 INJECTION, SOLUTION INTRAMUSCULAR; INTRAVENOUS; SUBCUTANEOUS EVERY 10 MIN PRN
Status: DISCONTINUED | OUTPATIENT
Start: 2018-01-01 | End: 2018-01-01 | Stop reason: HOSPADM

## 2018-01-01 RX ORDER — OSELTAMIVIR PHOSPHATE 75 MG/1
75 CAPSULE ORAL
Status: COMPLETED | OUTPATIENT
Start: 2018-01-01 | End: 2018-01-01

## 2018-01-01 RX ORDER — ETOMIDATE 2 MG/ML
INJECTION INTRAVENOUS
Status: DISCONTINUED | OUTPATIENT
Start: 2018-01-01 | End: 2018-01-01 | Stop reason: HOSPADM

## 2018-01-01 RX ORDER — FENTANYL CITRATE 50 UG/ML
50 INJECTION, SOLUTION INTRAMUSCULAR; INTRAVENOUS
Status: DISPENSED | OUTPATIENT
Start: 2018-01-01 | End: 2018-01-01

## 2018-01-01 RX ADMIN — HEPARIN SODIUM 5000 UNITS: 5000 INJECTION, SOLUTION INTRAVENOUS; SUBCUTANEOUS at 05:01

## 2018-01-01 RX ADMIN — HEPARIN SODIUM 5000 UNITS: 5000 INJECTION, SOLUTION INTRAVENOUS; SUBCUTANEOUS at 02:01

## 2018-01-01 RX ADMIN — SODIUM CHLORIDE, PRESERVATIVE FREE 10 ML: 5 INJECTION INTRAVENOUS at 05:01

## 2018-01-01 RX ADMIN — TRAZODONE HYDROCHLORIDE 50 MG: 50 TABLET ORAL at 08:01

## 2018-01-01 RX ADMIN — PROPOFOL 20 MCG/KG/MIN: 10 INJECTION, EMULSION INTRAVENOUS at 05:01

## 2018-01-01 RX ADMIN — ASPIRIN 325 MG ORAL TABLET 325 MG: 325 PILL ORAL at 10:01

## 2018-01-01 RX ADMIN — SODIUM CHLORIDE: 0.9 INJECTION, SOLUTION INTRAVENOUS at 03:01

## 2018-01-01 RX ADMIN — SODIUM CHLORIDE, PRESERVATIVE FREE 10 ML: 5 INJECTION INTRAVENOUS at 11:01

## 2018-01-01 RX ADMIN — LEUCINE, PHENYLALANINE, LYSINE, METHIONINE, ISOLEUCINE, VALINE, HISTIDINE, THREONINE, TRYPTOPHAN, ALANINE, GLYCINE, ARGININE, PROLINE, SERINE, TYROSINE, SODIUM ACETATE, DIBASIC POTASSIUM PHOSPHATE, MAGNESIUM CHLORIDE, SODIUM CHLORIDE, CALCIUM CHLORIDE, DEXTROSE
365; 280; 290; 200; 300; 290; 240; 210; 90; 1035; 515; 575; 340; 250; 20; 340; 261; 51; 59; 33; 15 INJECTION INTRAVENOUS at 05:01

## 2018-01-01 RX ADMIN — CEFEPIME HYDROCHLORIDE 2 G: 2 INJECTION, SOLUTION INTRAVENOUS at 06:01

## 2018-01-01 RX ADMIN — CEFEPIME 1 G: 1 INJECTION, POWDER, FOR SOLUTION INTRAMUSCULAR; INTRAVENOUS at 02:01

## 2018-01-01 RX ADMIN — LORAZEPAM 1 MG: 2 INJECTION INTRAMUSCULAR; INTRAVENOUS at 02:01

## 2018-01-01 RX ADMIN — VANCOMYCIN HYDROCHLORIDE 750 MG: 750 INJECTION, POWDER, LYOPHILIZED, FOR SOLUTION INTRAVENOUS at 10:01

## 2018-01-01 RX ADMIN — ETOMIDATE 8 MG: 2 INJECTION, SOLUTION INTRAVENOUS at 11:01

## 2018-01-01 RX ADMIN — DEXTROSE MONOHYDRATE: 5 INJECTION, SOLUTION INTRAVENOUS at 10:01

## 2018-01-01 RX ADMIN — AZITHROMYCIN MONOHYDRATE 500 MG: 500 INJECTION, POWDER, LYOPHILIZED, FOR SOLUTION INTRAVENOUS at 09:01

## 2018-01-01 RX ADMIN — HEPARIN SODIUM 5000 UNITS: 5000 INJECTION, SOLUTION INTRAVENOUS; SUBCUTANEOUS at 06:01

## 2018-01-01 RX ADMIN — FENTANYL CITRATE 25 MCG: 50 INJECTION, SOLUTION INTRAMUSCULAR; INTRAVENOUS at 02:01

## 2018-01-01 RX ADMIN — PROPOFOL 15 MCG/KG/MIN: 10 INJECTION, EMULSION INTRAVENOUS at 05:01

## 2018-01-01 RX ADMIN — ASCORBIC ACID, VITAMIN A PALMITATE, CHOLECALCIFEROL, THIAMINE HYDROCHLORIDE, RIBOFLAVIN-5 PHOSPHATE SODIUM, PYRIDOXINE HYDROCHLORIDE, NIACINAMIDE, DEXPANTHENOL, ALPHA-TOCOPHEROL ACETATE, VITAMIN K1, FOLIC ACID, BIOTIN, CYANOCOBALAMIN: 200; 3300; 200; 6; 3.6; 6; 40; 15; 10; 150; 600; 60; 5 INJECTION, SOLUTION INTRAVENOUS at 04:01

## 2018-01-01 RX ADMIN — SODIUM CHLORIDE: 0.9 INJECTION, SOLUTION INTRAVENOUS at 12:01

## 2018-01-01 RX ADMIN — SODIUM CHLORIDE 1000 ML: 0.9 INJECTION, SOLUTION INTRAVENOUS at 08:01

## 2018-01-01 RX ADMIN — SODIUM BICARBONATE 50 MEQ: 84 INJECTION, SOLUTION INTRAVENOUS at 10:01

## 2018-01-01 RX ADMIN — AZITHROMYCIN MONOHYDRATE 500 MG: 500 INJECTION, POWDER, LYOPHILIZED, FOR SOLUTION INTRAVENOUS at 08:01

## 2018-01-01 RX ADMIN — SODIUM CHLORIDE 500 ML: 900 INJECTION, SOLUTION INTRAVENOUS at 04:01

## 2018-01-01 RX ADMIN — SODIUM CHLORIDE, PRESERVATIVE FREE 10 ML: 5 INJECTION INTRAVENOUS at 12:01

## 2018-01-01 RX ADMIN — Medication 0.77 MCG/KG/MIN: at 04:01

## 2018-01-01 RX ADMIN — HEPARIN SODIUM 5000 UNITS: 5000 INJECTION, SOLUTION INTRAVENOUS; SUBCUTANEOUS at 10:01

## 2018-01-01 RX ADMIN — CEFEPIME 1 G: 1 INJECTION, POWDER, FOR SOLUTION INTRAMUSCULAR; INTRAVENOUS at 06:01

## 2018-01-01 RX ADMIN — VANCOMYCIN HYDROCHLORIDE 500 MG: 500 INJECTION, POWDER, LYOPHILIZED, FOR SOLUTION INTRAVENOUS at 09:01

## 2018-01-01 RX ADMIN — CEFEPIME 1 G: 1 INJECTION, POWDER, FOR SOLUTION INTRAMUSCULAR; INTRAVENOUS at 09:01

## 2018-01-01 RX ADMIN — HYDROMORPHONE HYDROCHLORIDE 0.2 MG: 2 INJECTION INTRAMUSCULAR; INTRAVENOUS; SUBCUTANEOUS at 02:01

## 2018-01-01 RX ADMIN — CEFEPIME 1 G: 1 INJECTION, POWDER, FOR SOLUTION INTRAMUSCULAR; INTRAVENOUS at 05:01

## 2018-01-01 RX ADMIN — SODIUM CHLORIDE, PRESERVATIVE FREE 10 ML: 5 INJECTION INTRAVENOUS at 06:01

## 2018-01-01 RX ADMIN — FENTANYL CITRATE 25 MCG: 50 INJECTION, SOLUTION INTRAMUSCULAR; INTRAVENOUS at 11:01

## 2018-01-01 RX ADMIN — Medication 0.02 MCG/KG/MIN: at 06:01

## 2018-01-01 RX ADMIN — PROPOFOL 5 MCG/KG/MIN: 10 INJECTION, EMULSION INTRAVENOUS at 12:01

## 2018-01-01 RX ADMIN — HEPARIN SODIUM 5000 UNITS: 5000 INJECTION, SOLUTION INTRAVENOUS; SUBCUTANEOUS at 01:01

## 2018-01-01 RX ADMIN — FENTANYL CITRATE 50 MCG: 50 INJECTION, SOLUTION INTRAMUSCULAR; INTRAVENOUS at 12:01

## 2018-01-01 RX ADMIN — FAMOTIDINE 20 MG: 10 INJECTION, SOLUTION INTRAVENOUS at 09:01

## 2018-01-01 RX ADMIN — OSELTAMIVIR PHOSPHATE 75 MG: 75 CAPSULE ORAL at 08:01

## 2018-01-01 RX ADMIN — SUCCINYLCHOLINE CHLORIDE 120 MG: 20 INJECTION, SOLUTION INTRAMUSCULAR; INTRAVENOUS at 11:01

## 2018-01-01 NOTE — PLAN OF CARE
Problem: Patient Care Overview  Goal: Plan of Care Review  Outcome: Ongoing (interventions implemented as appropriate)  Received pt on Ra; no distress noted

## 2018-01-01 NOTE — PROGRESS NOTES
[facility]  Adult Nutrition  Consult Note    SUMMARY     Recommendations    Recommendation/Intervention:   1. Rec add boost plus tid (vanilla or strawberry)   2. Rec lipids at least twice a week to prevent EFA deficiency; daily to aid with weight gain goal  3. RD to monitor    Goals: Pt will tolerate TPN  Nutrition Goal Status: new  Communication of RD Recs:  (sticky note)    Continuum of Care Plan    Referral to Outpatient Services:  (d/c needs to be determined)    Reason for Assessment    Reason for Assessment: RD follow-up  Diagnosis:  (N/V)  Relevent Medical History: COPD, cdiff, hernia repair, splenectomy, ileostomy         General Information Comments: Patient appears severely fat and lean body mass wasted. Patient to go home with TPN. Tolerating small amounts of po. Patient states he has issues with gas, cramping and constipation as well as large output from ostomy at times. Discussed use of supplements and avoidance of gas producing foods. Patient receptive.     Nutrition Prescription Ordered    Current Diet Order: NPO     Current Nutrition Support Formula Ordered: Clinimix E 5/15  Current Nutrition Support Rate Ordered: 75 (ml)  Current Nutrition Support Frequency Ordered: ml/hr        Evaluation of Received Nutrients/Fluid Intake    Parenteral Calories (kcal): 1278  Parenteral Protein (gm): 90  Parenteral Fluid (mL): 1800      Energy Calories Required: not meeting needs  % Kcal Needs: 70%     Protein Required: meeting needs  % Protein Needs: 138%      GIR (Glucose Infusion Rate) (mg/kg/min): 4.3 mg/kg/min     Fluid Required: meeting needs     Tolerance: tolerating (small amounts)    % Intake of Estimated Energy Needs:  50-75%  % Meal Intake: 25%    Nutrition Risk Screen     Nutrition Risk Screen: no indicators present    Nutrition/Diet History          Food Preferences: no Jehovah's witness or cultural food prefs identified   Factors Affecting Nutritional Intake: altered gastrointestinal  "function, NPO     Labs/Tests/Procedures/Meds     Pertinent Labs Reviewed: reviewed  Pertinent Labs Comments: prealb 11; alb 2.9   Pertinent Medications Reviewed: reviewed  Pertinent Medications Comments: MVI    Physical Findings    Overall Physical Appearance: generalized wasting, loss of muscle mass, loss of subcutaneous fat  Tubes:  (-)  Oral/Mouth Cavity: tooth/teeth missing  Skin:  (Simba 19-intact)    Anthropometrics    Temp: 97.3 °F (36.3 °C)     Height: 5' 7" (170.2 cm)  Weight Method: Bed Scale  Weight: 43.8 kg (96 lb 9 oz)  Ideal Body Weight (IBW), Male: 148 lb     % Ideal Body Weight, Male (lb): 64.8 lb     BMI (Calculated): 15.1  BMI Grade: less than 16 protein-energy malnutrition grade III     Estimated/Assessed Needs    Weight Used For Calorie Calculations: 43.8 kg (96 lb 9 oz)      Energy Calorie Requirements (kcal): 3553-3086   Energy Need Method: Kcal/kg     RMR (Perry-St. Jeor Equation): 1106.62      Weight Used For Protein Calculations: 43.8 kg (96 lb 9 oz)  Protein Requirements: 50-65g     Fluid Need Method: RDA Method      RDA Method (mL): 1533               Assessment and Plan    Failure to thrive in adult    Related to (etiology):   Dx/NPO    Signs and Symptoms (as evidenced by):   BMI 15    Interventions/Recommendations (treatment strategy):  See recs    Nutrition Diagnosis Status:   New              Monitor and Evaluation    Food and Nutrient Intake: parenteral nutrition intake  Food and Nutrient Adminstration: enteral and parenteral nutrition administration   Physical Activity and Function: nutrition-related ADLs and IADLs  Anthropometric Measurements: weight  Biochemical Data, Medical Tests and Procedures: electrolyte and renal panel, gastrointestinal profile, lipid profile  Nutrition-Focused Physical Findings: overall appearance    Nutrition Risk    Level of Risk:  (2xweekly)    Nutrition Follow-Up    RD Follow-up?: Yes        "

## 2018-01-01 NOTE — PROGRESS NOTES
[facility]  General Surgery  Progress Note    Subjective:     History of Present Illness:  Moises Hernandez is a 82 y.o. male very well known to general surgery service with history of chronic right pneumothorax (at least since May 2017), COPD, and intermittent bowel obstructions who presents for recurrent abdominal pain/bloating, nausea, and vomiting.  Symptoms began ~30 hours ago.  He has had no ostomy output since onset of symptoms.  This is his 4th readmission for the same problem this month.  He has to this point declined TPN and his obstructions have resolved each time without operative intervention.  He reports that over the past week, he had been doing very well.  He was ambulating well around the house and garden, he has been eating 3 good meals a day and supplementing with protein shakes.  His only hiccup last week was that his chest tube with Heimlich valve was accidentally removed at home on Christmas Eve.  He came to the ED to be evaluated and a CXR was stable, so no chest tube was replaced.  He denies fever, chills, chest pain, shortness of breath, blood in stool, bilious/bloody emesis.  He vomited water a couple times yesterday.    Post-Op Info:  * No surgery found *         Interval History:   No acute events  luz marina soft diet  Ost 1700    Medications:  Continuous Infusions:   Amino acid 5% - dextrose 15% (CLINIMIX-E) solution with additives (1L  provides 510 kcal/L dextrose, with 50 gm AA, 150 gm CHO, Na 35, K 30, Mg 5, Ca 4.5, Acetate 80, Cl 39, Phos 15) 75 mL/hr at 12/31/17 1503    Amino acid 5% - dextrose 15% (CLINIMIX-E) solution with additives (1L  provides 510 kcal/L dextrose, with 50 gm AA, 150 gm CHO, Na 35, K 30, Mg 5, Ca 4.5, Acetate 80, Cl 39, Phos 15)       Scheduled Meds:   famotidine (PF)  20 mg Intravenous Daily    heparin (porcine)  5,000 Units Subcutaneous Q8H    sodium chloride 0.9%  10 mL Intravenous Q6H    traZODone  50 mg Oral QHS     PRN  Meds:albuterol-ipratropium 2.5mg-0.5mg/3mL, ondansetron, Flushing PICC Protocol **AND** sodium chloride 0.9% **AND** sodium chloride 0.9%     Review of patient's allergies indicates:  No Known Allergies  Objective:     Vital Signs (Most Recent):  Temp: 97.3 °F (36.3 °C) (01/01/18 1130)  Pulse: 81 (01/01/18 1130)  Resp: 20 (01/01/18 1130)  BP: 118/62 (01/01/18 1130)  SpO2: 96 % (01/01/18 1124) Vital Signs (24h Range):  Temp:  [97.3 °F (36.3 °C)-99 °F (37.2 °C)] 97.3 °F (36.3 °C)  Pulse:  [78-96] 81  Resp:  [17-20] 20  SpO2:  [95 %-98 %] 96 %  BP: ()/(46-79) 118/62     Weight: 43.8 kg (96 lb 9 oz)  Body mass index is 15.12 kg/m².    Intake/Output - Last 3 Shifts       12/30 0700 - 12/31 0659 12/31 0700 - 01/01 0659 01/01 0700 - 01/02 0659    P.O. 450 915     I.V. (mL/kg)       IV Piggyback  1000     .3      Total Intake(mL/kg) 1326.3 (30.3) 1915 (43.7)     Urine (mL/kg/hr) 750 (0.7) 600 (0.6) 125 (0.4)    Stool 850 (0.8) 1700 (1.6)     Total Output 1600 2300 125    Net -273.8 -385 -125                 Physical Exam  Unchanged    Significant Labs:  CBC:   Recent Labs  Lab 12/29/17  0620   WBC 8.56   RBC 3.21*   HGB 9.8*   HCT 30.4*   *   MCV 95   MCH 30.5   MCHC 32.2     BMP:   Recent Labs  Lab 01/01/18  0545   GLU 97  97     137   K 4.4  4.4     104   CO2 28  28   BUN 24*  24*   CREATININE 0.7  0.7   CALCIUM 9.0  9.0   MG 1.9       Significant Diagnostics:  I have reviewed and interpreted all pertinent imaging results/findings within the past 24 hours.    Assessment/Plan:     * SBO (small bowel obstruction)    Plan for TPN as outpatient and soft/liquid diet only for comfort. Will re-eval weekly with nutrition labs and cmp. Once he improves we may be able to introduce diet again. He is extremely high risk for surgery now, not sure if this is a dietary issue at home.     Cannot go home w tpn until Tuesday, continue current treatment.         Failure to thrive in adult    -Long  discussion with patient and daughter; in the past they have declined TPN; however, given that this is his 4th episode of SBO in 1 month, we strongly advised parenteral nutrition; and they both agreed  -TPN  -nutrition labs every Monday/Friday        Hyponatremia    -Resolved  -Will monitor on TPN        Chronic pneumothorax    -Chest tube accidentally removed [place] without consequence  -He is on room air and hemodynamically stable  -No need for a new chest tube        Abdominal pain    -PRN morphine        COPD (chronic obstructive pulmonary disease) with acute bronchitis    -PRN Duo-nebs  -Patient on room air at home and doing well on room air as inpatient            Theodora Arciniega MD  General Surgery  [facility]

## 2018-01-01 NOTE — PROGRESS NOTES
Multivitamins are added to the Parenteral nutrition every Monday Wednesday and Friday. Per [employer] approved pharmacy protocol.

## 2018-01-01 NOTE — SUBJECTIVE & OBJECTIVE
Interval History:   No acute events  luz marina soft diet  Ost 1700    Medications:  Continuous Infusions:   Amino acid 5% - dextrose 15% (CLINIMIX-E) solution with additives (1L  provides 510 kcal/L dextrose, with 50 gm AA, 150 gm CHO, Na 35, K 30, Mg 5, Ca 4.5, Acetate 80, Cl 39, Phos 15) 75 mL/hr at 12/31/17 1503    Amino acid 5% - dextrose 15% (CLINIMIX-E) solution with additives (1L  provides 510 kcal/L dextrose, with 50 gm AA, 150 gm CHO, Na 35, K 30, Mg 5, Ca 4.5, Acetate 80, Cl 39, Phos 15)       Scheduled Meds:   famotidine (PF)  20 mg Intravenous Daily    heparin (porcine)  5,000 Units Subcutaneous Q8H    sodium chloride 0.9%  10 mL Intravenous Q6H    traZODone  50 mg Oral QHS     PRN Meds:albuterol-ipratropium 2.5mg-0.5mg/3mL, ondansetron, Flushing PICC Protocol **AND** sodium chloride 0.9% **AND** sodium chloride 0.9%     Review of patient's allergies indicates:  No Known Allergies  Objective:     Vital Signs (Most Recent):  Temp: 97.3 °F (36.3 °C) (01/01/18 1130)  Pulse: 81 (01/01/18 1130)  Resp: 20 (01/01/18 1130)  BP: 118/62 (01/01/18 1130)  SpO2: 96 % (01/01/18 1124) Vital Signs (24h Range):  Temp:  [97.3 °F (36.3 °C)-99 °F (37.2 °C)] 97.3 °F (36.3 °C)  Pulse:  [78-96] 81  Resp:  [17-20] 20  SpO2:  [95 %-98 %] 96 %  BP: ()/(46-79) 118/62     Weight: 43.8 kg (96 lb 9 oz)  Body mass index is 15.12 kg/m².    Intake/Output - Last 3 Shifts       12/30 0700 - 12/31 0659 12/31 0700 - 01/01 0659 01/01 0700 - 01/02 0659    P.O. 450 915     I.V. (mL/kg)       IV Piggyback  1000     .3      Total Intake(mL/kg) 1326.3 (30.3) 1915 (43.7)     Urine (mL/kg/hr) 750 (0.7) 600 (0.6) 125 (0.4)    Stool 850 (0.8) 1700 (1.6)     Total Output 1600 2300 125    Net -273.8 -385 -125                 Physical Exam  Unchanged    Significant Labs:  CBC:   Recent Labs  Lab 12/29/17  0620   WBC 8.56   RBC 3.21*   HGB 9.8*   HCT 30.4*   *   MCV 95   MCH 30.5   MCHC 32.2     BMP:   Recent Labs  Lab 01/01/18  0545    GLU 97  97     137   K 4.4  4.4     104   CO2 28  28   BUN 24*  24*   CREATININE 0.7  0.7   CALCIUM 9.0  9.0   MG 1.9       Significant Diagnostics:  I have reviewed and interpreted all pertinent imaging results/findings within the past 24 hours.

## 2018-01-01 NOTE — NURSING
Pt ileostomy bag changed for 2nd or 3rd  time today. This time it appears that bag blew off due to large amt of gas as pt eating soft foods. Attempted to reapply bag and allow gravity drainage to drain to a  a vented collection container.

## 2018-01-02 NOTE — PHYSICIAN QUERY
PT Name: Moises Hernandez  MR #: [id number]    Physician Query Form - Nutrition Clarification     CDS/: Yamila Srinivasan RN, CCDS              Contact information: [email or web address]      This form is a permanent document in the medical record.     Query Date: January 2, 2018    By submitting this query, we are merely seeking further clarification of documentation.. Please utilize your independent clinical judgment when addressing the question(s) below.    The Medical record contains the following:   Indicators  Supporting Clinical Findings Location in Medical Record    % of Estimated Energy Intake over a time frame from p.o., TF, or TPN      Weight Status over a time frame     X Subcutaneous Fat and/or Muscle Loss He appears cachectic.     generalized wasting, loss of muscle mass, loss of subcutaneous fat H & P    Nutrition progress note 01/01/2018     Fluid Accumulation or Edema      Reduced  Strength     X Wt / BMI / Usual Body Weight BMI (Calculated): 15.1 Nutrition progress note 01/01/2018   X Delayed Wound Healing / Failure to Thrive Failure to thrive in adult H & P   X Acute or Chronic Illness Recurrent SBO    SBO (small bowel obstruction)    -Given that this is his 4th obstruction in 1 month, he will require surgical intervention.     Chronic pneumothorax  COPD (chronic obstructive pulmonary disease) with acute bronchitis H & P     H & P        H & P    Medication      Treatment TPN,  H & P   X Other last prealbumin was 11 a week ago, trending down    His is also quite malnourished and will need parenteral nutrition pre and post-operatively until he is able to tolerate enteral nutrition.     Continue TPN due to malnutrition.    Plan for TPN as outpatient and soft/liquid diet only for comfort. Will re-eval weekly with nutrition labs and cmp. Once he improves we may be able to introduce diet again. He is extremely high risk for surgery now, not sure if this is a dietary issue at home.  H & P    H &  P        General Surgery progress note 12/29    General Surgery progress note 01/01/18     AND / ASPEN Clinical Characteristics (October 2011)  A minimum of two characteristics is recommended for diagnosing either moderate or severe malnutrition   Mild Malnutrition Moderate Malnutrition Severe Malnutrition   Energy Intake from p.o., TF or TPN. < 75% intake of estimated energy needs for less than 7 days < 75% intake of estimated energy needs for greater than 7 days < 50% intake of estimated energy needs for > 5 days   Weight Loss 1-2% in 1 month  5% in 3 months  7.5% in 6 months  10% in 1 year 1-2 % in 1 week  5% in 1 month  7.5% in 3 months  10% in 6 months  20% in 1 year > 2% in 1 week  > 5% in 1 month  > 7.5% in 3 months  > 10% in 6 months  > 20% in 1 year   Physical Findings     None *Mild subcutaneous fat and/or muscle loss  *Mild fluid accumulation  *Stage II decubitus  *Surgical wound or non-healing wound *Mod/severe subcutaneous fat and/or muscle loss  *Mod/severe fluid accumulation  *Stage III or IV decubitus  *Non-healing surgical wound     Provider, please specify diagnosis or diagnoses associated with above clinical findings.    [ ] Mild Protein-Calorie Malnutrition  [ ] Moderate Protein-Calorie Malnutrition  [ ] Severe Protein-Calorie Malnutrition  [xx ] Cachexia  [ ] Other Nutritional Diagnosis (please specify): ____________________________________  [ ] Other: ________________________________  [ ] Clinically Undetermined    Please document in your progress notes daily for the duration of treatment until resolved and include in your discharge summary.

## 2018-01-02 NOTE — PHYSICIAN QUERY
PT Name: Moises Hernandez  MR #: [id number]     Physician Query Form - Documentation Clarification      CDS/: Yamila Srinivasan RN, CCDS              Contact information:  [email or web address]          This form is a permanent document in the medical record.     Query Date: January 2, 2018    By submitting this query, we are merely seeking further clarification of documentation. Please utilize your independent clinical judgment when addressing the question(s) below.    The Medical record reflects the following:    Supporting Clinical Findings Location in Medical Record   82 y.o. male very well known to general surgery service with history of chronic right pneumothorax (at least since May 2017), COPD, and intermittent bowel obstructions who presents for recurrent abdominal pain/bloating, nausea, and vomiting.  Symptoms began ~30 hours ago.  He has had no ostomy output since onset of symptoms.  This is his 4th readmission for the same problem this month.     Past surgical history  Abdominal surgery  Hernia repair  Ileostomy  Splenectomy, total    Abd x-ray: SBO    SBO (small bowel obstruction)  -Admit to general surgery  -Given that this is his 4th obstruction in 1 month, he will require surgical intervention.  Long discussion with patient and daughter about what this would entail and that with his COPD and chronic right pneumothorax, he has significant risk of pulmonary complication post-operatively. His is also quite malnourished and will need parenteral nutrition pre and post-operatively until he is able to tolerate enteral nutrition.  No acute surgical intervention is needed at this time.  -Will try to obtain PFTs inpatient  -NPO  -NG trube  -IVFs   H & P            H & P            H & P    H & P                                                                                      Doctor, Please specify diagnosis or diagnoses associated with above clinical findings.  Please further specify small bowel  obstruction.    Provider Use Only        [   ] Complete:   [   ] Etiology:  Specify: ______________________  [   ] Etiology:  Unspecified   [   ] Etiology:  Clinically undetermined    [   ] Partial:     [ x  ] Etiology:  Specify: ___adhesion___________________  [   ] Etiology:  Unspecified   [   ] Etiology:  Clinically undetermined    [   ] Other (Specify) ______________________                                                                                                                 [  ] Clinically undetermined

## 2018-01-02 NOTE — PLAN OF CARE
Pt to d/c today with Count includes the [facility] and [facility] include [facility] beds at the [facility]/ Bio script. Cuong on way to floor to educate son in law.    Update: 5:25pm Plan for medications to be delivered to bedside, pt okay to discharge once IV medications delivered via .Cuong at bedside with patient and family now. Naomi Bed side RN updated of above.     Follow-up With  Details  Why  Contact Info   [facility] [place]    Infusion  [address]  [place] [address]  [phone]   [facility] [place]    Home Health  [address]  [place] [address]  [phone]   Rodriguez Castro MD  On 1/9/2018  @11:15am  [address]  [address]  [place] [address]  [phone]   Theodora Arciniega MD  On 1/15/2018  @1pm  [address]  [address]  [place] [address]  [phone]             Future Appointments  Date Time Provider Department Center   1/15/2018 1:00 PM Theodora Arciniega MD [facility]           01/02/18 1543   Final Note   Assessment Type Final Discharge Note   Discharge Disposition Home-Health   What phone number can be called within the next 1-3 days to see how you are doing after discharge? [phone]   Hospital Follow Up  Appt(s) scheduled? Yes   Discharge plans and expectations educations in teach back method with documentation complete? Yes   Right Care Referral Info   Post Acute Recommendation Home-care   Referral Type HH and IV home infusion   Facility Name [facility]

## 2018-01-02 NOTE — PLAN OF CARE
Problem: Patient Care Overview  Goal: Plan of Care Review  Outcome: Ongoing (interventions implemented as appropriate)  Pt is AAOx4, denies pain, no N/V. Pt receiving cont TPN. Pt has ileostomy bag with another dependant drain, bag comes detaches from skin often.

## 2018-01-02 NOTE — PROGRESS NOTES
Results for SALTY MEYER (MRN [id number]) as of 1/2/2018 11:57   Ref. Range 1/2/2018 05:24   Sodium Latest Ref Range: 136 - 145 mmol/L 137   Potassium Latest Ref Range: 3.5 - 5.1 mmol/L 4.5   Chloride Latest Ref Range: 95 - 110 mmol/L 103   CO2 Latest Ref Range: 23 - 29 mmol/L 29   Anion Gap Latest Ref Range: 8 - 16 mmol/L 5 (L)   BUN, Bld Latest Ref Range: 8 - 23 mg/dL 26 (H)   Creatinine Latest Ref Range: 0.5 - 1.4 mg/dL 0.7   eGFR if non African American Latest Ref Range: >60 mL/min/1.73 m^2 >60   eGFR if  Latest Ref Range: >60 mL/min/1.73 m^2 >60   Glucose Latest Ref Range: 70 - 110 mg/dL 99   Calcium Latest Ref Range: 8.7 - 10.5 mg/dL 9.4   Phosphorus Latest Ref Range: 2.7 - 4.5 mg/dL 3.9   Magnesium Latest Ref Range: 1.6 - 2.6 mg/dL 2.1   Reordered clinimix

## 2018-01-02 NOTE — PROGRESS NOTES
TN spoke with Cu[facility] with [facility]. Office is working on clarifying authorization that had been received.  Cuong will come educate daughter and patient    today.       Laura, Daughter called, no answer.

## 2018-01-03 NOTE — PROGRESS NOTES
[facility]  General Surgery  Progress Note    Subjective:     History of Present Illness:  Moises Hernandez is a 82 y.o. male very well known to general surgery service with history of chronic right pneumothorax (at least since May 2017), COPD, and intermittent bowel obstructions who presents for recurrent abdominal pain/bloating, nausea, and vomiting.  Symptoms began ~30 hours ago.  He has had no ostomy output since onset of symptoms.  This is his 4th readmission for the same problem this month.  He has to this point declined TPN and his obstructions have resolved each time without operative intervention.  He reports that over the past week, he had been doing very well.  He was ambulating well around the house and garden, he has been eating 3 good meals a day and supplementing with protein shakes.  His only hiccup last week was that his chest tube with Heimlich valve was accidentally removed at home on Christmas Eve.  He came to the ED to be evaluated and a CXR was stable, so no chest tube was replaced.  He denies fever, chills, chest pain, shortness of breath, blood in stool, bilious/bloody emesis.  He vomited water a couple times yesterday.    Post-Op Info:  * No surgery found *         Interval History:   No acute events  luz marina soft diet for comfort  Ost 1175 cc  No abdominal pain, N, V    Medications:  Continuous Infusions:   Amino acid 5% - dextrose 15% (CLINIMIX-E) solution with additives (1L  provides 510 kcal/L dextrose, with 50 gm AA, 150 gm CHO, Na 35, K 30, Mg 5, Ca 4.5, Acetate 80, Cl 39, Phos 15) 75 mL/hr at 01/02/18 1703     Scheduled Meds:   famotidine (PF)  20 mg Intravenous Daily    heparin (porcine)  5,000 Units Subcutaneous Q8H    sodium chloride 0.9%  10 mL Intravenous Q6H    traZODone  50 mg Oral QHS     PRN Meds:albuterol-ipratropium 2.5mg-0.5mg/3mL, ondansetron, Flushing PICC Protocol **AND** sodium chloride 0.9% **AND** sodium chloride 0.9%     Review of patient's  allergies indicates:  No Known Allergies  Objective:     Vital Signs (Most Recent):  Temp: 97.9 °F (36.6 °C) (01/02/18 1530)  Pulse: 89 (01/02/18 1530)  Resp: 16 (01/02/18 1530)  BP: (!) 101/59 (01/02/18 1530)  SpO2: 99 % (01/02/18 1526) Vital Signs (24h Range):  Temp:  [97.5 °F (36.4 °C)-99.2 °F (37.3 °C)] 97.9 °F (36.6 °C)  Pulse:  [] 89  Resp:  [16-20] 16  SpO2:  [95 %-99 %] 99 %  BP: ()/(48-69) 101/59     Weight: 43.8 kg (96 lb 9 oz)  Body mass index is 15.12 kg/m².    Intake/Output - Last 3 Shifts       12/31 0700 - 01/01 0659 01/01 0700 - 01/02 0659 01/02 0700 - 01/03 0659    P.O. 915  360    IV Piggyback 1000      TPN  912     Total Intake(mL/kg) 1915 (43.7) 912 (20.8) 360 (8.2)    Urine (mL/kg/hr) 600 (0.6) 1050 (1) 200 (0.4)    Stool 1700 (1.6) 1175 (1.1) 250 (0.5)    Total Output 2300 2225 450    Net -385 -1313 -90                 Physical Exam  NAD  RRR  Abdomen soft, NT, ND; ostomy with gas and stool  Unchanged    Significant Labs:  CBC:     Recent Labs  Lab 12/29/17  0620   WBC 8.56   RBC 3.21*   HGB 9.8*   HCT 30.4*   *   MCV 95   MCH 30.5   MCHC 32.2     BMP:     Recent Labs  Lab 01/02/18  0524   GLU 99      K 4.5      CO2 29   BUN 26*   CREATININE 0.7   CALCIUM 9.4   MG 2.1       Significant Diagnostics:  I have reviewed and interpreted all pertinent imaging results/findings within the past 24 hours.    Assessment/Plan:     * SBO (small bowel obstruction)    Plan for TPN as outpatient and soft/liquid diet only for comfort. Will re-eval weekly with nutrition labs and cmp. Once he improves we may be able to introduce diet again. He is extremely high risk for surgery now, not sure if this is a dietary issue at home.     OK for discharge home today pending Home Health set up for TPN        Failure to thrive in adult    -Long discussion with patient and daughter; in the past they have declined TPN; however, given that this is his 4th episode of SBO in 1 month, we strongly  advised parenteral nutrition; and they both agreed  -TPN  -nutrition labs every Monday/Friday        Hyponatremia    -Resolved  -Will monitor on TPN        Chronic pneumothorax    -Chest tube accidentally removed [place] without consequence  -He is on room air and hemodynamically stable  -No need for a new chest tube        Abdominal pain    -resolved; avoid narcotics if possible        COPD (chronic obstructive pulmonary disease) with acute bronchitis    -PRN Duo-nebs  -Patient on room air at home and doing well on room air as inpatient            Ari House Jr., MD  General Surgery  [facility]

## 2018-01-03 NOTE — HOSPITAL COURSE
Patient presented for recurrent SBO.  As in the past, this resolved spontaneously rather quickly.  He was placed on TPN after long discussions with family and patient.  He progressed well over the weekend without issue.  He maintained ostomy output and pain resolved as did the nausea and vomiting.  He was able to tolerate some soft diet for comfort but is getting and will get most nutrition via parenteral route.  Once home health TPN was set up (1/2/18), he was stable for discharge.

## 2018-01-03 NOTE — ASSESSMENT & PLAN NOTE
Plan for TPN as outpatient and soft/liquid diet only for comfort. Will re-eval weekly with nutrition labs and cmp. Once he improves we may be able to introduce diet again. He is extremely high risk for surgery now, not sure if this is a dietary issue at home.     OK for discharge home today pending Home Health set up for TPN

## 2018-01-03 NOTE — SUBJECTIVE & OBJECTIVE
Interval History:   No acute events  luz marina soft diet for comfort  Ost 1175 cc  No abdominal pain, N, V    Medications:  Continuous Infusions:   Amino acid 5% - dextrose 15% (CLINIMIX-E) solution with additives (1L  provides 510 kcal/L dextrose, with 50 gm AA, 150 gm CHO, Na 35, K 30, Mg 5, Ca 4.5, Acetate 80, Cl 39, Phos 15) 75 mL/hr at 01/02/18 1703     Scheduled Meds:   famotidine (PF)  20 mg Intravenous Daily    heparin (porcine)  5,000 Units Subcutaneous Q8H    sodium chloride 0.9%  10 mL Intravenous Q6H    traZODone  50 mg Oral QHS     PRN Meds:albuterol-ipratropium 2.5mg-0.5mg/3mL, ondansetron, Flushing PICC Protocol **AND** sodium chloride 0.9% **AND** sodium chloride 0.9%     Review of patient's allergies indicates:  No Known Allergies  Objective:     Vital Signs (Most Recent):  Temp: 97.9 °F (36.6 °C) (01/02/18 1530)  Pulse: 89 (01/02/18 1530)  Resp: 16 (01/02/18 1530)  BP: (!) 101/59 (01/02/18 1530)  SpO2: 99 % (01/02/18 1526) Vital Signs (24h Range):  Temp:  [97.5 °F (36.4 °C)-99.2 °F (37.3 °C)] 97.9 °F (36.6 °C)  Pulse:  [] 89  Resp:  [16-20] 16  SpO2:  [95 %-99 %] 99 %  BP: ()/(48-69) 101/59     Weight: 43.8 kg (96 lb 9 oz)  Body mass index is 15.12 kg/m².    Intake/Output - Last 3 Shifts       12/31 0700 - 01/01 0659 01/01 0700 - 01/02 0659 01/02 0700 - 01/03 0659    P.O. 915  360    IV Piggyback 1000      TPN  912     Total Intake(mL/kg) 1915 (43.7) 912 (20.8) 360 (8.2)    Urine (mL/kg/hr) 600 (0.6) 1050 (1) 200 (0.4)    Stool 1700 (1.6) 1175 (1.1) 250 (0.5)    Total Output 2300 2225 450    Net -385 -1313 -90                 Physical Exam  NAD  RRR  Abdomen soft, NT, ND; ostomy with gas and stool  Unchanged    Significant Labs:  CBC:     Recent Labs  Lab 12/29/17  0620   WBC 8.56   RBC 3.21*   HGB 9.8*   HCT 30.4*   *   MCV 95   MCH 30.5   MCHC 32.2     BMP:     Recent Labs  Lab 01/02/18  0524   GLU 99      K 4.5      CO2 29   BUN 26*   CREATININE 0.7   CALCIUM 9.4    MG 2.1       Significant Diagnostics:  I have reviewed and interpreted all pertinent imaging results/findings within the past 24 hours.

## 2018-01-03 NOTE — DISCHARGE SUMMARY
[facility]  General Surgery  Discharge Summary      Patient Name: Moises Hernandez  MRN: [id number]  Admission Date: 12/28/2017  Hospital Length of Stay: 5 days  Discharge Date and Time:  01/02/2018 6:24 PM  Attending Physician: Piero Ugarte MD   Discharging Provider: Ari House Jr., MD  Primary Care Provider: Rodriguez Castro MD    HPI:   Moises Hernandez is a 82 y.o. male very well known to general surgery service with history of chronic right pneumothorax (at least since May 2017), COPD, and intermittent bowel obstructions who presents for recurrent abdominal pain/bloating, nausea, and vomiting.  Symptoms began ~30 hours ago.  He has had no ostomy output since onset of symptoms.  This is his 4th readmission for the same problem this month.  He has to this point declined TPN and his obstructions have resolved each time without operative intervention.  He reports that over the past week, he had been doing very well.  He was ambulating well around the house and garden, he has been eating 3 good meals a day and supplementing with protein shakes.  His only hiccup last week was that his chest tube with Heimlich valve was accidentally removed at home on Christmas Eve.  He came to the ED to be evaluated and a CXR was stable, so no chest tube was replaced.  He denies fever, chills, chest pain, shortness of breath, blood in stool, bilious/bloody emesis.  He vomited water a couple times yesterday.    * No surgery found *      Indwelling Lines/Drains at time of discharge:   Lines/Drains/Airways     Peripherally Inserted Central Catheter Line                 PICC Double Lumen 12/28/17 1710 right brachial 5 days          Drain                 Colostomy -- days         Ileostomy 01/02/16  days         Chest Tube 12/08/17 1153 1 Right 10 Fr. 25 days              Hospital Course: Patient presented for recurrent SBO.  As in the past, this resolved spontaneously rather quickly.  He was  placed on TPN after long discussions with family and patient.  He progressed well over the weekend without issue.  He maintained ostomy output and pain resolved as did the nausea and vomiting.  He was able to tolerate some soft diet for comfort but is getting and will get most nutrition via parenteral route.  Once home health TPN was set up (1/2/18), he was stable for discharge.    Consults:   Consults         Status Ordering Provider     Inpatient consult to PICC team (NIAS)  Once     Provider:  (Not yet assigned)    Acknowledged DIANE SUGGS JR.          Significant Diagnostic Studies: Labs:   BMP:   Recent Labs  Lab 01/01/18  0545 01/02/18  0524   GLU 97  97 99     137 137   K 4.4  4.4 4.5     104 103   CO2 28  28 29   BUN 24*  24* 26*   CREATININE 0.7  0.7 0.7   CALCIUM 9.0  9.0 9.4   MG 1.9 2.1    and CBC No results for input(s): WBC, HGB, HCT, PLT in the last 48 hours.  Radiology: X-Ray: CXR: X-Ray Chest 1 View (CXR):   Results for orders placed or performed during the hospital encounter of 12/28/17   X-Ray Chest 1 View for PICC_Central line    Narrative    Chest one view    Indication:PICC line placement    Comparison:12/24/2017    Findings: Right PICC catheter tip projects over the brachiocephalic/SVC junction.  Endotracheal tube tip projects approximately 5.4 cm above the cady. The cardiomediastinal silhouette is stable noting calcification of aortic arch.  There is no pleural effusion.  The trachea is midline.  The lungs are asymmetrically expanded with coarse interstitial attenuation on the left, similar, noting left apical atelectasis or scarring.  There is continued large right pneumothorax, grossly unchanged. No large focal consolidation seen.   The osseous structures are unchanged.    Impression    As above      Electronically signed by: PENNIE SUNSHINE MD  Date:     12/28/17  Time:    17:22     and X-Ray Chest PA and Lateral (CXR): No results found for this visit on  12/28/17. and KUB: X-Ray Abdomen AP 1 View (KUB):   Results for orders placed or performed during the hospital encounter of 12/28/17   X-Ray Abdomen AP 1 View    Narrative    EXAM: ABDOMEN 1 VIEW    CLINICAL INDICATION:  Nasogastric tube placement.    COMPARISON:  Abdominal radiograph, same day.    TECHNIQUE:  Single AP supine view of the abdomen centered at the diaphragm and excludes the pelvis.    FINDINGS:  There is a massive right pneumothorax with collapse of the right lung, similar in appearance to multiple prior examinations. Nasogastric tube is noted with the distal tip overlying the proximal stomach with the side-port at the level of the gastroesophageal junction.    Impression     Persistent slightly high positioning of the nasogastric tube. Consider advancement of an additional 5-10 cm.      Electronically signed by: Daryl Mesa  Date:     12/28/17  Time:    22:04          Pending Diagnostic Studies:     None        Final Active Diagnoses:    Diagnosis Date Noted POA    PRINCIPAL PROBLEM:  SBO (small bowel obstruction) [K56.609] 12/11/2017 Yes    Failure to thrive in adult [R62.7] 12/08/2017 Yes    Hyponatremia [E87.1] 12/08/2017 Yes    Chronic pneumothorax [J93.81] 12/08/2017 Yes    Abdominal pain [R10.9] 05/04/2017 Yes    COPD (chronic obstructive pulmonary disease) with acute bronchitis [J44.0, J20.9]  Yes      Problems Resolved During this Admission:    Diagnosis Date Noted Date Resolved POA      Discharged Condition: good    Disposition: Home-Health Care Svc    Follow Up:  Follow-up Information     [facility].    Specialty:  Home Health Services  Why:  Infusion  Contact information:  [address]  [facility] [address]  [phone]             [facility].    Specialties:  Home Health Services, DME Provider  Why:  Home Health  Contact information:  [address] [facility] [place] [address]  [phone]             Rodriguez Castro MD On 1/9/2018.     Specialty:  Family Medicine  Why:  @11:15am  Contact information:  [address]  [address]  [place]  [phone]             Theodora Arciniega MD On 1/15/2018.    Specialties:  Surgery, General Surgery  Why:  @1pm  Contact information:  [address]  [address]  [place]  [phone]                 Patient Instructions:     Activity as tolerated     Notify your health care provider if you experience any of the following:  persistent nausea and vomiting or diarrhea     Notify your health care provider if you experience any of the following:  temperature >100.4     Notify your health care provider if you experience any of the following:  persistent dizziness, light-headedness, or visual disturbances     Notify your health care provider if you experience any of the following:  increased confusion or weakness     Notify your health care provider if you experience any of the following:  difficulty breathing or increased cough     NPO       Medications:  Reconciled Home Medications:   Current Discharge Medication List      CONTINUE these medications which have NOT CHANGED    Details   ondansetron (ZOFRAN-ODT) 4 MG TbDL Take 2 tablets (8 mg total) by mouth every 8 (eight) hours as needed.  Qty: 20 tablet, Refills: 0      pantoprazole (PROTONIX) 20 MG tablet Take 20 mg by mouth once daily.      PROAIR HFA 90 mcg/actuation inhaler every 4 (four) hours as needed.          STOP taking these medications       metoclopramide HCl (REGLAN) 10 MG tablet Comments:   Reason for Stopping:             Time spent on the discharge of patient: 20 minutes    Ari House Jr., MD  General Surgery  [facility]

## 2018-01-04 NOTE — PROGRESS NOTES
C3 nurse attempted to contact patient. No answer. The following message was left for the patient to return the call:  Good morning  I am a nurse calling on behalf of [facility] from the [facility].  This is a Transitional Care Call for Moises Hernandez. When you have a moment please contact us at [phone] or [phone] Monday through Friday, between the hours of 8 am to 4 pm. We look forward to speaking with you. On behalf of [facility] have a nice day.    The patient has a scheduled HOSFU appointment with Rodriguez Castro MD on 1/9/2018  @11:15 am.

## 2018-01-04 NOTE — PATIENT INSTRUCTIONS
Small Bowel Obstruction     Small bowel obstruction can lead to tissue damage and even tissue death.   A small bowel obstruction occurs when part or all of the small intestine (bowel) is blocked. As a result, digestive contents cant move through the bowel properly and out of the body. Treatment is needed right away to remove the blockage. This can ease painful symptoms. It can also prevent serious problems, such as tissue death or bursting (rupture) of the small bowel. Without treatment, a small bowel obstruction can be fatal.  Causes of small bowel obstruction  A small bowel obstruction can be caused by:  · Scar tissue (adhesions). These may form after belly (abdominal) surgery or an infection.  · Hernia. A hernia is when an organ pushes through a weak spot or tear in the abdomen wall. Part of the small bowel can push out and be seen as a bulge under the belly. Hernias can also occur internally.  · Certain health problems. These include when part of the bowel slides inside another part (intussusception). Other causes include irritable bowel disease such as Crohns disease, and inflammation and sores in the intestine (ulcerative colitis).  · Abnormal tissue growths (tumors). These can form on the inside or outside of the small bowel. They are usually due to cancer.  Symptoms of small bowel obstruction  Common symptoms include:  · Belly cramping and pain  · Belly swelling and bloating  · Upset stomach (nausea) and vomiting  · Can't  pass gas  · Can't pass stool (constipation)  · Diarrhea  Diagnosing small bowel obstruction  Your provider will ask about your symptoms and health history. Youll also have a physical exam. Tests may also be done to confirm the problem. These can include:  · Imaging tests. These provide pictures of the small bowel. Common tests include X-rays and a CT scan.  · Blood tests. These check for infection and other problems, such as excess fluid loss (dehydration).  · Upper GI  (gastrointestinal) series with a small bowel follow-through. This test takes X-rays of the upper digestive tract from the mouth through the small bowel. An X-ray dye (contrast fluid) is used. The dye coats the inside of your upper digestive tract so it will show up clearly on X-rays.  Treating small bowel obstruction  Treatment takes place in a hospital. As part of your care, the following may be done:  · No food or drink is given by mouth. This allows your bowels to rest.  · An IV (intravenous) line is placed in a vein in your arm or hand. The IV line is used to give fluids. It may also be used to give medicines. These may be needed to ease pain, nausea, and other symptoms. They may also be needed to treat or prevent infections.  · A soft, thin, flexible tube (nasogastric tube) is inserted through your nose and into your stomach. The tube is used to remove extra gas and fluid in your stomach and bowels. This helps to ease symptoms such as pain and swelling.  · In severe cases, surgery is done. This may be needed if the small bowel is almost or totally blocked, or there is a hole in the bowel (bowel perforation). During surgery, the blockage is removed. Parts of the bowel may also be removed if there is tissue death. Other repair may be done as well, depending on what caused the blockage. Your healthcare provider will give you more information about surgery, if needed.  · Youll be watched closely in the hospital until your symptoms improve. Your provider will tell you when you can go home.  Long-term concerns   After treatment, most people recover with no lasting effects. If a long part of the bowel is removed, there is a greater chance for lifelong digestive problems. Bowel movements may become irregular. Work with your provider to learn the best ways to manage any symptoms you may have, and to protect your health.  When to call your healthcare provider  Call your provider right away if you have any of the  following:  · Severe pain (Call 911)  · Belly swelling or cramping that wont go away  · Cant pass stool or gas  · Nausea or vomiting (especially if the vomit looks or smells like stool)   Date Last Reviewed: 7/1/2016  © 9120-2807 [employer]. [address] [place] [address], [place] [address]. All rights reserved. This information is not intended as a substitute for professional medical care. Always follow your healthcare professional's instructions.

## 2018-01-12 NOTE — TELEPHONE ENCOUNTER
1/12/18  4:07pm   Second attempt to contact patient regarding appointment time and MD change. No answer. Message left via voicemail detailing change.

## 2018-01-15 NOTE — PROGRESS NOTES
Spoke with daughter over the phone. Laura John [phone]  [facility] like she was unable to get him up today due to back pain.  He does suffer with sciatica for years. Today his back pain radiates to both legs.  He took some aleve an hour ago with some relief.  No NV.   On TPN  Home health is checking on him. No fevers  Mild cough that seems to be improving  No NV, ab distention, or abdominal pain    Will call in some pain meds for intermittent use  Told daughter to call to reschedule appointment when convenient

## 2018-01-15 NOTE — TELEPHONE ENCOUNTER
----- Message from Augie SHOEMAKER Sagar sent at 1/15/2018 10:16 AM CST -----  Contact: [phone] self  Patient called in requesting to speak with you. Patient prefers to speak with a nurse. Please advise.    1/15/18  11:15  Call returned to patient's daughter, Laura, who states patient is unable to move legs due to severe pain. Also, patient has developed a cough since discharge. I suggested to daughter that she call 911 to get patient in to ED. Patient's daughter refused. Patient's daughter informed that a message will be sent to Dr. GLENDY Arciniega who is currently performing surgery. Verbalized understanding. Patient's daughter stated she would wait for Dr. GLENDY Arciniega's response.

## 2018-01-17 PROBLEM — R65.20 SEPSIS WITH METABOLIC ENCEPHALOPATHY: Status: ACTIVE | Noted: 2018-01-01

## 2018-01-17 PROBLEM — B97.89 SEPSIS, VIRAL: Status: ACTIVE | Noted: 2018-01-01

## 2018-01-17 PROBLEM — E46 PROTEIN-CALORIE MALNUTRITION: Status: ACTIVE | Noted: 2018-01-01

## 2018-01-17 PROBLEM — N17.9 ACUTE KIDNEY INJURY SUPERIMPOSED ON CKD: Status: ACTIVE | Noted: 2018-01-01

## 2018-01-17 PROBLEM — A41.89 SEPSIS, VIRAL: Status: ACTIVE | Noted: 2018-01-01

## 2018-01-17 PROBLEM — A41.9 SEPSIS WITH METABOLIC ENCEPHALOPATHY: Status: ACTIVE | Noted: 2018-01-01

## 2018-01-17 PROBLEM — J10.1 INFLUENZA A: Status: ACTIVE | Noted: 2018-01-01

## 2018-01-17 PROBLEM — G93.41 SEPSIS WITH METABOLIC ENCEPHALOPATHY: Status: ACTIVE | Noted: 2018-01-01

## 2018-01-17 PROBLEM — A41.9 SEPSIS: Status: ACTIVE | Noted: 2018-01-01

## 2018-01-17 PROBLEM — N18.9 ACUTE KIDNEY INJURY SUPERIMPOSED ON CKD: Status: ACTIVE | Noted: 2018-01-01

## 2018-01-17 NOTE — ED PROVIDER NOTES
Encounter Date: 1/17/2018    SCRIBE #1 NOTE: I, Raymon Marques, am scribing for, and in the presence of,  Dr. Alejandra Dunn. I have scribed the entire note.       History     Chief Complaint   Patient presents with    Shortness of Breath     Reports lethargy, generalized weakness, SOB, confusion. Hx of collapsed lung and multiple bowel obstructions. Has ostomy. Pt AAOx4.      Time patient was seen by the provider: 2:47 PM      The patient is a 82 y.o. male with co-morbidities including: ileostomy, splenectomy, COPD, C.diff,  who presents to the ED with a complaint of SOB, AMS and fever. SOB began 1 day prior, he is coughing up yellow sputum and this is chronic per family members at bedside. He has felt a subjective fever and chills on Monday and had a temp of 101 last night. He has not been eating much, per son in law who is caretaker. This morning, he was reported to be very lethargic, was not oriented to place or situation.  Pt remained alert. He had not been taking pain medicine, but was given ibuprofen and acetaminophen when a fever at home was noticed. Cough is chronic, for the last month. He is not on O2 at home. He has had sick contact - multiple family members with the flu.  The patient has a PICC line for TPN.  He has had decreased po intake over the past few days but did eat a bowl of grits this morning.  His PICC line is reported to be cleaned, flushed, and taken care of every day.  His PICC nurse evaluated him yesterday and gave the family a good report. Pt has ileostomy bag in place that has been draining well per family.  Pt is alert and oriented x4 at bedside          The history is provided by the patient, a caregiver and a relative.     Review of patient's allergies indicates:  No Known Allergies  Past Medical History:   Diagnosis Date    C. difficile diarrhea     COPD (chronic obstructive pulmonary disease)      Past Surgical History:   Procedure Laterality Date    ABDOMINAL SURGERY      EYE  SURGERY      HERNIA REPAIR      ILEOSTOMY      SPLENECTOMY, TOTAL       Family History   Problem Relation Age of Onset    Cancer Mother      stomach    Heart attack Father      Social History   Substance Use Topics    Smoking status: Former Smoker     Packs/day: 1.00     Years: 60.00     Types: Cigarettes     Quit date: 2015    Smokeless tobacco: Never Used    Alcohol use Yes     Review of Systems   Constitutional: Positive for activity change, appetite change, chills and fever.   HENT: Negative for congestion and sore throat.    Eyes: Negative for visual disturbance.   Respiratory: Positive for cough and shortness of breath. Negative for chest tightness.    Cardiovascular: Negative for chest pain and leg swelling.   Gastrointestinal: Negative for abdominal pain, diarrhea, nausea and vomiting.   Endocrine: Negative for polydipsia and polyphagia.   Genitourinary: Positive for frequency. Negative for decreased urine volume and flank pain.   Musculoskeletal: Negative for myalgias.   Skin: Negative for pallor.   Neurological: Negative for dizziness and syncope.   Psychiatric/Behavioral: Positive for confusion. Negative for agitation.   All other systems reviewed and are negative.      Physical Exam     Initial Vitals [01/17/18 1431]   BP Pulse Resp Temp SpO2   (!) 90/54 (!) 114 (!) 24 97.7 °F (36.5 °C) (!) 92 %      MAP       66         Physical Exam    Nursing note and vitals reviewed.  Constitutional: He is not diaphoretic. No distress.   Chronically ill, dehydrated 83 y/o male   HENT:   Head: Normocephalic and atraumatic.   Tongue dry and cracked   Eyes: Conjunctivae are normal. Pupils are equal, round, and reactive to light.   Left eye reactive to light, right eye prosthetic.   Neck: Normal range of motion. Neck supple.   Cardiovascular: Regular rhythm and normal heart sounds.   Tachycardia  hypotension   Pulmonary/Chest: No respiratory distress.   No breath sounds on right, clear on left  tachypnea    Abdominal: Soft. He exhibits no distension.   Ostomy with good output soft brown stool   Musculoskeletal: Normal range of motion. He exhibits no edema or tenderness.   RUE:  No erythema, swelling, drainage, or TTP at the PICC insertion site.    Neurological: He is alert and oriented to person, place, and time. He has normal strength. No cranial nerve deficit or sensory deficit.   Neuro intact   Skin: Skin is warm and dry.   Psychiatric: He has a normal mood and affect. His behavior is normal.         ED Course   Critical Care  Date/Time: 1/17/2018 5:30 PM  Performed by: MARIXA VIDAL  Authorized by: MARIXA VIDAL   Total critical care time (exclusive of procedural time) : 0 minutes        Labs Reviewed   APTT - Abnormal; Notable for the following:        Result Value    aPTT 37.8 (*)     All other components within normal limits   B-TYPE NATRIURETIC PEPTIDE - Abnormal; Notable for the following:      (*)     All other components within normal limits   CBC W/ AUTO DIFFERENTIAL - Abnormal; Notable for the following:     WBC 32.55 (*)     RBC 3.96 (*)     Hemoglobin 12.3 (*)     Hematocrit 35.3 (*)     MCH 31.1 (*)     MPV 13.2 (*)     All other components within normal limits   COMPREHENSIVE METABOLIC PANEL - Abnormal; Notable for the following:     Sodium 119 (*)     Chloride 88 (*)     CO2 17 (*)     Glucose 128 (*)     BUN, Bld 76 (*)     Albumin 2.6 (*)     AST 76 (*)     eGFR if  54 (*)     eGFR if non  47 (*)     All other components within normal limits    Narrative:      Sodium result verified by repeat analysis;  critical result(s)   called and verbal readback obtained from RAMONA Guerrero RN.,   01/17/2018 17:10   LIPASE - Abnormal; Notable for the following:     Lipase <3 (*)     All other components within normal limits   TROPONIN I - Abnormal; Notable for the following:     Troponin I 0.146 (*)     All other components within normal limits    INFLUENZA A AND B ANTIGEN - Abnormal; Notable for the following:     Influenza A Ag, EIA Positive (*)     All other components within normal limits   CULTURE, BLOOD   CULTURE, BLOOD   LACTIC ACID, PLASMA   MAGNESIUM   PHOSPHORUS   PROTIME-INR   CORTISOL, RANDOM   PROCALCITONIN   TSH   URINALYSIS   LACTIC ACID, PLASMA     EKG Readings: (Independently Interpreted)   Initial Reading: No STEMI. Rhythm: Sinus Tachycardia. Heart Rate: 108. Clinical Impression: Sinus Tachycardia       X-Rays:   Independently Interpreted Readings:   Chest X-Ray: Normal heart size.  No infiltrates. No ptx   Head CT: No hemorrhage.  No skull fracture.  No acute stroke.     Medical Decision Making:   Initial Assessment:   83 y/o male with PICC line, ileostomy, splenectomy presents with SOB, confusion, hypotension, tachycardia, and tachypnea.  Differential Diagnosis:   Sepsis, influenza, pneumonia, metabolic derangement, CVA, TIA, UTI, encephalopathy, ACS, ABRAHAM  Independently Interpreted Test(s):   I have ordered and independently interpreted X-rays - see prior notes.  I have ordered and independently interpreted EKG Reading(s) - see prior notes  Clinical Tests:   Lab Tests: Ordered and Reviewed  Radiological Study: Ordered and Reviewed  Medical Tests: Reviewed and Ordered  ED Management:  3:00 PM  Will order septic workup and CT brain    5:21 PM  Pt found influenza A+, markedly elevated WBC, elevated BNP, BUN 76, hyponatremia (119). Lactic acid normal, troponin elevated. Cultures were obtained, started IV antibiotics as well as tamiflu. Pt has been hydrated with IV NS 25 cc/kg in ED.  Pt will be admitted to [facility] internal medicine pending CT head result.    5:53 PM  CT head resulted with no significant findings, paged [facility] for admit.  [facility] resident request an additional liter of NS to be given at this time.  I have discussed the results and plan for admit with family members and patient.  All questions and concerns addressed.        Other:   I have discussed this case with another health care provider.                   ED Course      Clinical Impression:   The primary encounter diagnosis was Sepsis, due to unspecified organism. Diagnoses of Altered mental status, Volume depletion, Hyponatremia, Elevated troponin, and Influenza A were also pertinent to this visit.    Disposition:   Disposition: Admitted  Condition: Fair  I, Dr. Alejandra Dunn, personally performed the services described in this documentation. All medical record entries made by the scribe were at my direction and in my presence.  I have reviewed the chart and agree that the record reflects my personal performance and is accurate and complete. Alejandra Dunn MD.  10:18 PM 01/18/2018                          Alejandra Dunn MD  01/18/18 [id number]       Alejandra Dunn MD  01/18/18 5747

## 2018-01-17 NOTE — TELEPHONE ENCOUNTER
----- Message from Maria A James sent at 1/17/2018  9:34 AM CST -----  Contact: [phone] / Laura pt daughter  Requesting to speak with you regarding the patients medications. Please advise.    1/17/18            1429  Returned Laura's call regarding patient information. No answer. Left voicemail to return call to the office.

## 2018-01-17 NOTE — ED NOTES
Patient has verified the spelling of their name and  on armband  LOC: The patient is awake, alert, and aware of environment with an appropriate affect, the patient is oriented x 3 and speaking appropriately.   APPEARANCE: Patient resting comfortably and in no acute distress, patient is clean and well groomed, patient's clothing is properly fastened.   SKIN: The skin is warm and dry, color consistent with ethnicity, patient has normal skin turgor and moist mucus membranes, skin intact, no breakdown or bruising noted, picc line to right arm tpn at home.   : due to void  MUSCULOSKELETAL: Patient moving all extremities spontaneously, no obvious swelling or deformities noted, generalized weakness.   RESPIRATORY: Airway is open and patent, respirations are spontaneous, patient has a normal effort and rate, no accessory muscle use noted, bilateral breath sounds clear,   ABDOMEN: Soft and non tender to palpation, no distention noted, normoactive bowel sounds present in all four quadrants colostomy bag.   CARDIAC: Normal rate and rhythm, no peripheral edema noted, less then 3 second capillary refill, denies chest pain  COMPLAINT:

## 2018-01-17 NOTE — TELEPHONE ENCOUNTER
----- Message from Pita Viramontes sent at 1/17/2018  2:45 PM CST -----  Contact: Self [phone]  Patient Returning Your Phone Call    1/17/18                    1634  Attempted to contact Laura. No answer. Left voicemail.

## 2018-01-18 PROBLEM — R79.89 ELEVATED TROPONIN: Status: ACTIVE | Noted: 2018-01-01

## 2018-01-18 PROBLEM — E86.9 VOLUME DEPLETION: Status: ACTIVE | Noted: 2018-01-01

## 2018-01-18 PROBLEM — R41.82 ALTERED MENTAL STATUS: Status: ACTIVE | Noted: 2018-01-01

## 2018-01-18 PROBLEM — R78.81 GRAM-POSITIVE COCCI BACTEREMIA: Status: ACTIVE | Noted: 2018-01-01

## 2018-01-18 PROBLEM — A41.9 SEPSIS: Status: ACTIVE | Noted: 2018-01-01

## 2018-01-18 PROBLEM — R65.20 SEVERE SEPSIS: Status: ACTIVE | Noted: 2018-01-01

## 2018-01-18 PROBLEM — A41.9 SEVERE SEPSIS: Status: ACTIVE | Noted: 2018-01-01

## 2018-01-18 PROBLEM — R78.81 BACTEREMIA: Status: ACTIVE | Noted: 2018-01-01

## 2018-01-18 NOTE — PLAN OF CARE
[facility] Plan of Care Note    Discussed code status again with patient's family given likely need for intubation. He has a grandson who is an ER physician in [place] whom we also spoke with. At this point his family does not want compressions. They are not completely decided on if they want intubation since it is likely that it would be a complicated course with a prolonged intubation. They are currently speaking with the patient and trying to come up with a plan together regarding if they want the patient to be intubated.    In addition, patient began having ST changes on EKG. We called Dr. Mendieta and discussed it with him, and he thinks it is more likely due to demand in this setting. Next troponin pending.     We are currently with the family and patient as they make decisions regarding end of life care and his rapidly deteriorating clinical course. Will continue to stay in ICU with family as these decisions are made.

## 2018-01-18 NOTE — CONSULTS
[facility]  Adult Nutrition  Consult Note    SUMMARY     Recommendations    Recommendation/Intervention:   1. If nutr support initiation desired, see below for recs:   Pt would benefit from TF vs TPN but h/o SBO's noted.   ---For TF, rec Isosource 1.5 w/ a goal rate of 40 ml/hr to provide 1440 cals, 65 gms prot, & 746 mls free fl  ---For TPN recs, I do not recommend using Clinimix as it only has 35meq of Na per liter & pt is already hyponatremic. Rec ordering a custom provision of 65 gms amino acid (4.5% amino acid), 216 gms dextrose (15% dextrose) @ 60ml/hr + 250 mls 20 % intralipid daily. This would provide 1494 cals & 65 gms prot. Customization of electrolytes per MD and/or pharmacy.  2. Adjust IVF per MD discretion after TPN hangs  Goals: Nutr support initiation or diet advancement w/in 48 hrs  Nutrition Goal Status: new  Communication of RD Recs:  (Care plan)    Reason for Assessment    Reason for Assessment: physician consult  Diagnosis: infection/sepsis (pna; influenza)  Relevent Medical History: recurrent SBO's, hernia repair, ileostomy, COPD, cdiff         General Information Comments: Pt familiar to nutr services from prior admissions. Most recently discharged earlier this month on TPN & oral diet mostly for pleasure. Pt w/no significant wt changes over the past yr per chart review; has been malnourished for some time. Pt intubated last night 2/2 increased work of breathing; remains so. DNR status w/ poor prognosis noted per MD notes.    Nutrition Discharge Planning: Unable to determine @ this time    Nutrition Prescription Ordered    Current Diet Order: NPO            Evaluation of Received Nutrients/Fluid Intake           Energy Calories Required: not meeting needs                 Protein Required: not meeting needs        IV Fluid (mL):  (NS @ 100ml/hr)           I/O: reviewed         Fluid Required:  (per MD)  Comments: LBM via ileostomy 1/18     % Intake of Estimated Energy Needs:  "0 - 25 %  % Meal Intake: NPO       Nutrition/Diet History       Typical Food/Fluid Intake: TPN prior to admit  Food Preferences: MICHAELA        Factors Affecting Nutritional Intake: on mechanical ventilation, NPO        Labs/Tests/Procedures/Meds       Pertinent Labs Reviewed: reviewed  Pertinent Labs Comments: statin, IVF, Na bicarb  Pertinent Medications Reviewed: reviewed  Pertinent Medications Comments: Na 124, Cl 93, BUN 72, , lactate 3.8    Physical Findings    Overall Physical Appearance: generalized wasting, on ventilator support  Tubes: orogastric tube     Skin:  (MICHAELA)    Anthropometrics    Temp: 97.4 °F (36.3 °C)     Height: 5' 7" (170.2 cm)  Weight Method: Bed Scale  Weight: 43.9 kg (96 lb 12.5 oz)  Ideal Body Weight (IBW), Male: 148 lb     % Ideal Body Weight, Male (lb): 65.39 lb     BMI (Calculated): 15.2  BMI Grade: less than 16 protein-energy malnutrition grade III     Usual Body Weight (UBW), k kg     % Usual Body Weight: 104.74  % Weight Change From Usual Weight: 4.52 %             Estimated/Assessed Needs    Weight Used For Calorie Calculations: 43.9 kg (96 lb 12.5 oz)      Energy Calorie Requirements (kcal): 1399  Energy Need Method: [place]      RMR ([place] Jeor Equation): 1107.62  RMR ([place] Equation): 1398.98  RMR (Modified [place] Equation): 1448.76  Weight Used For Protein Calculations: 43.9 kg (96 lb 12.5 oz)  Protein Requirements: 66 gms (1.5 gms/kg)     Fluid Need Method: RDA Method (1ml/kcal or per MD)        RDA Method (mL): 1399               Assessment and Plan    Protein-calorie malnutrition    Related to (etiology):   Chronic illness; PMH    Signs and Symptoms (as evidenced by):   Cachexia; low BMI    Interventions/Recommendations (treatment strategy):  See recs    Nutrition Diagnosis Status:   New              Monitor and Evaluation    Food and Nutrient Intake: energy intake  Food and Nutrient Adminstration: enteral and parenteral nutrition administration, " diet order        Anthropometric Measurements: weight, weight change  Biochemical Data, Medical Tests and Procedures: electrolyte and renal panel, glucose/endocrine profile, inflammatory profile  Nutrition-Focused Physical Findings: overall appearance    Nutrition Risk    Level of Risk:  (F/u 2 x weekly)    Nutrition Follow-Up    RD Follow-up?: Yes

## 2018-01-18 NOTE — PLAN OF CARE
Problem: Patient Care Overview  Goal: Plan of Care Review  Pt received as charted on vent flowsheet. Ambu bag and mask at bedside. All alarms on and functional with adequate volume. Cuff pressure monitored via MLT. Pt with no apprent distress noted. Will continue to  Monitor.

## 2018-01-18 NOTE — ASSESSMENT & PLAN NOTE
Influenza A positive with sepsis - PICC source suspected-  Blood cx with Gm + cocci resembling staph  WBC 32K, Lactate 3.8 with procal of 7. Patient on Tamiflu, Cefepime, Zithromax, and Vanc.  Patient with respiratory decompensation overnight and required intubation.   BP low 70s-80s- sedation and sepsis    HR elevated 110s-130s.  TNI 0.1,  0.4  ST depression anterolateral leads which resolved on subsequent ECG and felt to be demand in etiology  Echo pending   Patient is DNR, cachetic, malnourished with multiple comorbidities, prognosis poor  Recommend conservative medical management and if recovers will obtain stress test

## 2018-01-18 NOTE — PLAN OF CARE
Problem: Patient Care Overview  Goal: Plan of Care Review  Recommendation/Intervention:   1. If nutr support initiation desired, see below for recs:   Pt would benefit from TF vs TPN but h/o SBO's noted.   ---For TF, rec Isosource 1.5 w/ a goal rate of 40 ml/hr to provide 1440 cals, 65 gms prot, & 746 mls free fl  ---For TPN recs, I do not recommend using Clinimix as it only has 35meq of Na per liter & pt is already hyponatremic. Rec ordering a custom provision of 65 gms amino acid (4.5% amino acid), 216 gms dextrose (15% dextrose) @ 60ml/hr + 250 mls 20 % intralipid daily. This would provide 1494 cals & 65 gms prot. Customization of electrolytes per MD and/or pharmacy.  2. Adjust IVF per MD discretion after TPN hangs  Goals: Nutr support initiation or diet advancement w/in 48 hrs  Nutrition Goal Status: new  Communication of RD Recs:  (Care plan)

## 2018-01-18 NOTE — PROGRESS NOTES
This note also relates to the following rows which could not be included:  Oxygen Concentration (%) - Cannot attach notes to unvalidated device data  SpO2 - Cannot attach notes to unvalidated device data  Pulse - Cannot attach notes to unvalidated device data  Resp - Cannot attach notes to unvalidated device data  BP - Cannot attach notes to unvalidated device data  Vent Mode - Cannot attach notes to unvalidated device data  Set Rate - Cannot attach notes to unvalidated device data  Vt Set - Cannot attach notes to unvalidated device data  PEEP/CPAP - Cannot attach notes to unvalidated device data  Pressure Support - Cannot attach notes to unvalidated device data  Peak Flow - Cannot attach notes to unvalidated device data  Set Inspiratory Pressure - Cannot attach notes to unvalidated device data  Insp Time - Cannot attach notes to unvalidated device data  Plateau Set/Insp. Hold (sec) - Cannot attach notes to unvalidated device data  Trigger Sensitivity Flow/I-Trigger - Cannot attach notes to unvalidated device data  Resp Rate Total - Cannot attach notes to unvalidated device data  Peak Airway Pressure - Cannot attach notes to unvalidated device data  Mean Airway Pressure - Cannot attach notes to unvalidated device data  Plateau Pressure - Cannot attach notes to unvalidated device data  Exhaled Vt - Cannot attach notes to unvalidated device data  Total Ve - Cannot attach notes to unvalidated device data  Spont Ve - Cannot attach notes to unvalidated device data  I:E Ratio Measured - Cannot attach notes to unvalidated device data  Vt Low Alarm - Cannot attach notes to unvalidated device data  Apnea Rate - Cannot attach notes to unvalidated device data  Apnea Volume (mL) - Cannot attach notes to unvalidated device data  Apnea Oxygen Concentration  - Cannot attach notes to unvalidated device data  Apnea Flow Rate (L/min) - Cannot attach notes to unvalidated device data    Pt received as charted on vent flowsheet. Ambu  bag and mask at bedside. All alarms on and functional with adequate volume. Cuff pressure monitored via MLT. Pt with no apprent distress noted. Will continue to  Monitor.

## 2018-01-18 NOTE — PLAN OF CARE
Patient arrived to unit via stretcher with ER nurse. Assisted into bed. AAOX4 at this time. Family at bedside. No distress noted

## 2018-01-18 NOTE — PLAN OF CARE
[facility] Plan of Care Note    Received call from nursing that patient was brought up from ED. His oxygen was 96% on 2L in ED but now down to low 80s after increasing nasal canula to 5L then placed on non rebreather mask. Patient has received about 1.5L fluids so far because volume down. Fluids have been stopped but no crackles on lung exam.    Given his history of chronic right pneumothorax, we cannot escalate to bipap at this time. Spoke with his family who are all present in the room, and the want him full code with intubation if necessary.     Currently awaiting results of ABG and CXR. Transfer orders to ICU have been placed. Spoke with EICU about likely needing intubation.     Labs coming back with procalcitonin at 7. Likely some kind of bacterial infection in addition to sepsis. He may possibly have a PICC line infection. He is getting azithro, cefepime, vancomycin, and tamiflu.     Meliza Domínguez, DO  [facility]  01/17/2018  10:03 PM

## 2018-01-18 NOTE — PLAN OF CARE
Notified Dr. Morales and Dr. Domínguez of patient's BP dropping most likely secondary to sedation. Confirmed with MDs that no pressors are to be started. STIVEN, Sotero updated.  Luna Mcclain, RN

## 2018-01-18 NOTE — CONSULTS
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 The patient was recently discharged from the hospital 1/2/18 after presenting with no ostomy output and abdominal pain. At that visit, the patient was placed on TPN and sent home 2/2 to malnutrition from these frequent SBO. He patient had been reportedly ding well at home until about 3-4 days after discharge hen he developed an intermittent dry cough. Then, the night before admit, the patient developed SOB and fevers. Over the course of the night, the patient's daughter noted that the patient was becoming increasingly confused, so she brought him to the hospital. Of note, the patient lives with his daughter and son-in-law, and they both had the flu.    The patient has a chronic large R sided pleural effusion. . In 12/2015, the patient was first noted to have a large R sided pneumothorax which improved after chest tube placement. The next time the patient was evaluated in the hospital was 5/2017, and it was noted then that the pneumothorax had reoccurred. He has had multiple chest tubes placed without any resolution/improvement since then.    Past Medical History:  Past Medical History:   Diagnosis Date    C. difficile diarrhea     COPD (chronic obstructive pulmonary disease)        Past Surgical History:  Past Surgical History:   Procedure Laterality Date    ABDOMINAL SURGERY      EYE SURGERY      HERNIA REPAIR      ILEOSTOMY      SPLENECTOMY, TOTAL         Allergies:  Review of patient's allergies indicates:  No Known Allergies    Home Medications:  Prior to Admission medications    Medication Sig Start Date End  "Date Taking? Authorizing Provider   hydrocodone-acetaminophen 5-325mg (NORCO) 5-325 mg per tablet Take 1 tablet by mouth every 6 (six) hours as needed for Pain. 1/15/18   Piero Ugarte MD   ondansetron (ZOFRAN-ODT) 4 MG TbDL Take 2 tablets (8 mg total) by mouth every 8 (eight) hours as needed. 17   Piero Ugarte MD   pantoprazole (PROTONIX) 20 MG tablet Take 20 mg by mouth once daily.    Historical Provider, MD   PROAIR HFA 90 mcg/actuation inhaler every 4 (four) hours as needed.  16   Historical Provider, MD       Family History:  Family History   Problem Relation Age of Onset    Cancer Mother      stomach    Heart attack Father        Social History:  Social History   Substance Use Topics    Smoking status: Former Smoker     Packs/day: 1.00     Years: 60.00     Types: Cigarettes     Quit date:     Smokeless tobacco: Never Used    Alcohol use Yes       Review of Systems:  Pertinent positives and negatives are listed in HPI.  All other systems are reviewed and are negative.    Objective:   Last 24 Hour Vital Signs:  BP  Min: 64/49  Max: 148/92  Temp  Av.2 °F (36.8 °C)  Min: 97.4 °F (36.3 °C)  Max: 99.7 °F (37.6 °C)  Pulse  Av.7  Min: 61  Max: 162  Resp  Av.5  Min: 15  Max: 40  SpO2  Av.5 %  Min: 87 %  Max: 100 %  Height  Av' 7" (170.2 cm)  Min: 5' 7" (170.2 cm)  Max: 5' 7" (170.2 cm)  Weight  Av.2 kg (92 lb 14.8 oz)  Min: 41.3 kg (91 lb)  Max: 43.9 kg (96 lb 12.5 oz)  Body mass index is 15.16 kg/m².  I/O last 3 completed shifts:  In: 917.4 [I.V.:567.4; IV Piggyback:350]  Out: 100 [Urine:100]    Physical Examination:  General: Intubated and sedated  Eyes:  PERRL; EOMi with anicteric sclera and clear conjunctivae  Mouth:  Oropharynx clear and without exudate; moist mucous membranes  Cardio:  Regular rate and rhythm with normal S1 and S2; no murmurs or rubs  Resp:  Absent breath sounds on R side, otherwise CTA on L  Abdom: Soft, NTND with normoactive bowel " sounds  Extrem: WWP with no clubbing, cyanosis or edema  Skin:  No rashes, lesions, or color changes  Pulses: 2+ and symmetric distally  Neuro:  Intubated and sedated    Laboratory:  Most Recent Data:  CBC: Lab Results   Component Value Date    WBC 32.55 (H) 01/17/2018    HGB 12.3 (L) 01/17/2018    HCT 35.3 (L) 01/17/2018     01/17/2018    MCV 89 01/17/2018    RDW 12.8 01/17/2018     BMP: Lab Results   Component Value Date     (L) 01/17/2018    K 3.8 01/17/2018    CL 93 (L) 01/17/2018    CO2 17 (L) 01/17/2018    BUN 72 (H) 01/17/2018    CREATININE 1.3 01/17/2018    GLU 94 01/17/2018    CALCIUM 8.7 01/17/2018    MG 2.2 01/17/2018    PHOS 4.3 01/17/2018     LFTs: Lab Results   Component Value Date    PROT 7.1 01/17/2018    ALBUMIN 2.6 (L) 01/17/2018    BILITOT 0.6 01/17/2018    AST 76 (H) 01/17/2018    ALKPHOS 115 01/17/2018    ALT 33 01/17/2018     Coags:   Lab Results   Component Value Date    INR 1.1 01/17/2018     Urinalysis: Lab Results   Component Value Date    LABURIN  12/12/2017     Multiple organisms isolated. None in predominance.  Repeat if    LABURIN clinically necessary. 12/12/2017    COLORU Yellow 01/17/2018    SPECGRAV 1.015 01/17/2018    NITRITE Negative 01/17/2018    KETONESU Negative 01/17/2018    UROBILINOGEN Negative 01/17/2018    WBCUA 5 01/01/2016       Trended Lab Data:    Recent Labs  Lab 01/17/18  1540 01/17/18  2323   WBC 32.55*  --    HGB 12.3*  --    HCT 35.3*  --      --    MCV 89  --    RDW 12.8  --    * 124*   K 4.0 3.8   CL 88* 93*   CO2 17* 17*   BUN 76* 72*   CREATININE 1.4 1.3   * 94   PROT 7.1  --    ALBUMIN 2.6*  --    BILITOT 0.6  --    AST 76*  --    ALKPHOS 115  --    ALT 33  --        Trended Cardiac Data:    Recent Labs  Lab 01/17/18  1540 01/17/18  2323   TROPONINI 0.146* 0.455*   *  --        Radiology:  Imaging Results          X-Ray Chest 1 View (Final result)  Result time 01/18/18 00:15:10    Final result by Anyi Luciano MD  (01/18/18 00:15:10)                 Impression:      Stable large right-sided pneumothorax.      Electronically signed by: SANTO HAMILTON MD  Date:     01/18/18  Time:    00:15              Narrative:    Chest AP single view.  Comparison: 1/17/18 at 21:55.    Interval placement of endotracheal tube with distal tip seen within the mid to distal trachea.  Rosana is not well visualized.  Right-sided PICC line in stable position.  Redemonstration of large right-sided pneumothorax with mild leftward mediastinal shift.  No significant change in cardiopulmonary status from earlier exam.                             X-Ray Chest 1 View (Final result)  Result time 01/17/18 23:15:31    Final result by Santo Hamilton MD (01/17/18 23:15:31)                 Impression:      Stable massive right-sided pneumothorax.  No significant change in the appearance of the chest from earlier exam.      Electronically signed by: SANTO HAMILTON MD  Date:     01/17/18  Time:    23:15              Narrative:    Chest AP single view.  Comparison: 15:21.    There is redemonstration of massive right-sided pneumothorax with mild leftward mediastinal shift.  Heart is stable in size.  Stable appearance of the left lung with coarse interstitial lung changes and reticular opacities.  Edema, aspiration, are atypical pneumonia should be considered.  No confluent focal consolidation seen.  No evidence of left-sided pneumothorax.  No significant effusion.  No significant change in cardiopulmonary status from earlier exam.                             CT Head Without Contrast (Final result)  Result time 01/17/18 17:36:11    Final result by Santo Hamilton MD (01/17/18 17:36:11)                 Impression:       No acute intracranial abnormalities identified.      Electronically signed by: SANTO HAMILTON MD  Date:     01/17/18  Time:    17:36              Narrative:    Exam: CT of the head without contrast -- 82-year-old male with altered mental  status.    Comparison: None.    Technique: 5 mm noncontrast axial images through the brain were obtained.    Findings: There is mild age-appropriate generalized cerebral volume loss and chronic microvascular ischemic change.  No evidence of acute/recent major vascular distribution cerebral infarction, intraparenchymal hemorrhage, or intra-axial space occupying lesion. The ventricular system is normal in size and configuration with no evidence of hydrocephalus. No effacement of the skull-base cisterns. No abnormal extra-axial fluid collections or blood products. The visualized paranasal sinuses and mastoid air cells are clear. The calvarium shows no significant abnormality.  Right eye prosthesis is noted.                             X-Ray Chest AP Portable (Final result)  Result time 01/17/18 15:37:20    Final result by Daryl Mesa MD (01/17/18 15:37:20)                 Impression:        Stable hyperinflation of the left lung with coarsened interstitial opacities, likely reflecting sequela of emphysema/COPD.    Stable massive right pneumothorax.      Electronically signed by: Daryl Mesa  Date:     01/17/18  Time:    15:37              Narrative:    COMPARISON: 12/29/2017.    FINDINGS: One view of the chest was obtained.  Right-sided PICC is unchanged in position.  Stable appearance of a massive right-sided pneumothorax with complete collapse of the right lung. The trachea is midline. Left lung is hyperinflated and demonstrates coarsened interstitial markings and reticular opacities, similar in appearance to the prior exam. Note is again made of left apical scarring or subsegmental atelectasis. No large focal consolidation. Surgical clips overlie the upper abdomen.                                 Assessment:     Moises Hernandez is a 82 y.o. male with      Plan:   Acute hypoxic respiratory failure  -Pt. In respiratory distress likely 2/2 influenza in combination with metabolic acidosis requiring  compensation  -Continue broad spectrum abx and tamiflu  -Now intubated, will manage and wean as necessary although this is a difficult situation with the patient's large R sided pneumothorax. Positive pressure could possibly cause tension physiology and cause obstructive shock  -Pt. Likely also has septic shock from bacteremia  -Recommend further discussion with family regarding goals of care    NSTEMI  -Troponin elevation likely 2/2 to demand ischeima  -Recommend TTE to evaluate cardiac function in setting of shock and elevated cardiac enzymes and to look for vegetations in setting of bacteremia    Sepsis 2/2 Staph bacteremia  -Continue vancomycin and cefepime until sensitivities result  -Source likely PICC line vs. Post-influenza staph pneumonia  -Agree with removal of PICC line     Chronic R sided pneumothorax  -Chronic R sided pneumothorax with multiple chest tubes placed in prior hospital stays  -Pt. Has possible endobronchial lesion could be preventing reexpansion of lung  -Consider evaluation of airway with bronch once patient is more stable

## 2018-01-18 NOTE — PLAN OF CARE
Patient becoming increasingly short of breath. On 2L NC patient sats 82%. Oxygen increased to 5L NC. Respiratory paged.

## 2018-01-18 NOTE — PT/OT/SLP PROGRESS
Occupational Therapy      Patient Name:  Moises Hernandez   MRN:  [id number]    Patient not seen today secondary to nursing hold. Will follow-up later as able.    Blanka Polo, OT  1/18/2018

## 2018-01-18 NOTE — CONSULTS
[facility]  Cardiology  Consult Note    Patient Name: Moises Hernandez  MRN: [id number]  Admission Date: 1/17/2018  Hospital Length of Stay: 1 days  Code Status: DNR   Attending Provider: Neda Sinclair MD   Consulting Provider: Migel Castañeda NP  Primary Care Physician: Rodriguez Castro MD  Principal Problem:Gram-positive cocci bacteremia    Patient information was obtained from past medical records and ER records.     Inpatient consult to [facility]  Consult performed by: MIGEL CASTAÑEDA  Consult ordered by: RASHIDA RAMIREZ        Subjective:     Chief Complaint:  SOB, confusion, fever, cough      HPI:   Moises Hernandez is a 82 y.o. male  with COPD, chronic large right pneumothorax, failure to thrive- on TPN, SBO, CKD. The patient presented to the ED with SOB, lethargy, generalized weakness,  confusion. HPI retrieved from chart as patient is intubated and sedated.   Patient was discharged from the hospital on 1/2/18 for SBO which was treated conservatively . He was placed on TPN and discharged. Patient reportedly feeling poorly since discharge with dry cough, fever and confusion. Influenza A positive. Patient decompensated overnight and required intubation. BP low 70s-80s. HR elevated 110s-130s. TNI 0.4, ST depression anterolateral leads which improved on subsequent ECG. Blood cx with Gm + cocci resembling staph- source suspected PICC. Lactate 3.8 with procal of 7. Patient on Tamiflu, Cefepime, Zithromax, and Vanc.  Patient is DNR.     Past Medical History:   Diagnosis Date    C. difficile diarrhea     COPD (chronic obstructive pulmonary disease)        Past Surgical History:   Procedure Laterality Date    ABDOMINAL SURGERY      EYE SURGERY      HERNIA REPAIR      ILEOSTOMY      SPLENECTOMY, TOTAL         Review of patient's allergies indicates:  No Known Allergies    No current facility-administered medications on file prior to encounter.      Current Outpatient  Prescriptions on File Prior to Encounter   Medication Sig    hydrocodone-acetaminophen 5-325mg (NORCO) 5-325 mg per tablet Take 1 tablet by mouth every 6 (six) hours as needed for Pain.    ondansetron (ZOFRAN-ODT) 4 MG TbDL Take 2 tablets (8 mg total) by mouth every 8 (eight) hours as needed.    pantoprazole (PROTONIX) 20 MG tablet Take 20 mg by mouth once daily.    PROAIR HFA 90 mcg/actuation inhaler every 4 (four) hours as needed.      Family History     Problem Relation (Age of Onset)    Cancer Mother    Heart attack Father        Social History Main Topics    Smoking status: Former Smoker     Packs/day: 1.00     Years: 60.00     Types: Cigarettes     Quit date: 2015    Smokeless tobacco: Never Used    Alcohol use Yes    Drug use: No    Sexual activity: Not on file     Review of Systems   Unable to perform ROS: intubated     Objective:     Vital Signs (Most Recent):  Temp: 97.4 °F (36.3 °C) (01/18/18 0530)  Pulse: 103 (01/18/18 1221)  Resp: (!) 22 (01/18/18 1221)  BP: (!) 81/52 (01/18/18 1221)  SpO2: 100 % (01/18/18 1221) Vital Signs (24h Range):  Temp:  [97.4 °F (36.3 °C)-99.7 °F (37.6 °C)] 97.4 °F (36.3 °C)  Pulse:  [] 103  Resp:  [15-40] 22  SpO2:  [87 %-100 %] 100 %  BP: ()/(45-92) 81/52     Weight: 43.9 kg (96 lb 12.5 oz)  Body mass index is 15.16 kg/m².    SpO2: 100 %  O2 Device (Oxygen Therapy): ventilator      Intake/Output Summary (Last 24 hours) at 01/18/18 1237  Last data filed at 01/18/18 1000   Gross per 24 hour   Intake           917.39 ml   Output              125 ml   Net           792.39 ml       Lines/Drains/Airways     Peripherally Inserted Central Catheter Line                 PICC Double Lumen 12/28/17 1710 right brachial 20 days          Drain                 Colostomy -- days         Ileostomy 01/02/16  days         Chest Tube 12/08/17 1153 1 Right 10 Fr. 41 days         Urethral Catheter 01/17/18 2315 Coude less than 1 day          Airway                 Airway  - Non-Surgical 01/17/18 2347 Endotracheal Tube less than 1 day                Physical Exam   Constitutional: He appears cachectic. He has a sickly appearance. He is sedated and intubated.   Eyes: Right eye exhibits no discharge. Left eye exhibits no discharge.   Neck: No JVD present.   Cardiovascular: Regular rhythm.   No extrasystoles are present. Tachycardia present.    Murmur heard.  High-pitched blowing holosystolic murmur is present  at the apex  Pulmonary/Chest: Tachypnea noted. He is intubated. He has decreased breath sounds in the right upper field, the right middle field and the right lower field.   Abdominal: Soft. Bowel sounds are decreased.   Musculoskeletal: He exhibits no edema.   Neurological:   Sedated    Skin: Skin is warm and dry.       Significant Labs:   ABG:   Recent Labs  Lab 01/17/18  2204   PH 7.246*   PCO2 31.3*   HCO3 13.6*   POCSATURATED 97   BE -14   , Blood Culture:   Recent Labs  Lab 01/17/18  1540 01/17/18  1702   LABBLOO Gram stain aer bottle: Gram positive cocci in clusters resembling Staph   Gram stain ab bottle: Gram positive cocci in clusters resembling Staph   Results called to and read back by:Brittney Bruce RN 01/18/2018  04:06 Gram stain aer bottle: Gram positive cocci in clusters resembling Staph   Gram stain ab bottle: Gram positive cocci in clusters resembling Staph   Results called to and read back by:Brittney Bruce RN 01/18/2018  04:06   , BMP:   Recent Labs  Lab 01/17/18  1540 01/17/18  2323   * 94   * 124*   K 4.0 3.8   CL 88* 93*   CO2 17* 17*   BUN 76* 72*   CREATININE 1.4 1.3   CALCIUM 9.3 8.7   MG 2.2  --    , CMP   Recent Labs  Lab 01/17/18  1540 01/17/18  2323   * 124*   K 4.0 3.8   CL 88* 93*   CO2 17* 17*   * 94   BUN 76* 72*   CREATININE 1.4 1.3   CALCIUM 9.3 8.7   PROT 7.1  --    ALBUMIN 2.6*  --    BILITOT 0.6  --    ALKPHOS 115  --    AST 76*  --    ALT 33  --    ANIONGAP 14 14   ESTGFRAFRICA 54* 60   EGFRNONAA 47* 52*   ,  CBC   Recent Labs  Lab 01/17/18  1540   WBC 32.55*   HGB 12.3*   HCT 35.3*      , INR   Recent Labs  Lab 01/17/18  1540   INR 1.1   , Troponin   Recent Labs  Lab 01/17/18  1540 01/17/18  2323   TROPONINI 0.146* 0.455*    and All pertinent lab results from the last 24 hours have been reviewed.    Significant Imaging: Echocardiogram:   2D echo with color flow doppler:   Results for orders placed or performed during the hospital encounter of 12/23/15   2D echo with color flow doppler   Result Value Ref Range    EF 55 55 - 65    Diastolic Dysfunction No     and X-Ray: CXR: X-Ray Chest 1 View (CXR):   Results for orders placed or performed during the hospital encounter of 01/17/18   X-Ray Chest 1 View    Narrative    Chest AP single view.  Comparison: 1/17/18 at 21:55.    Interval placement of endotracheal tube with distal tip seen within the mid to distal trachea.  Rosana is not well visualized.  Right-sided PICC line in stable position.  Redemonstration of large right-sided pneumothorax with mild leftward mediastinal shift.  No significant change in cardiopulmonary status from earlier exam.    Impression    Stable large right-sided pneumothorax.      Electronically signed by: SANTO HAMILTON MD  Date:     01/18/18  Time:    00:15      Assessment and Plan:     Elevated troponin    Influenza A positive with sepsis - PICC source suspected-  Blood cx with Gm + cocci resembling staph  WBC 32K, Lactate 3.8 with procal of 7. Patient on Tamiflu, Cefepime, Zithromax, and Vanc.  Patient with respiratory decompensation overnight and required intubation.   BP low 70s-80s- sedation and sepsis    HR elevated 110s-130s.  TNI 0.1,  0.4  ST depression anterolateral leads which resolved on subsequent ECG and felt to be demand in etiology  Echo pending   Patient is DNR, cachetic, malnourished with multiple comorbidities, prognosis poor  Recommend conservative medical management and if recovers will obtain stress test                VTE Risk Mitigation         Ordered     heparin (porcine) injection 5,000 Units  Every 8 hours     Route:  Subcutaneous        01/18/18 0624     Medium Risk of VTE  Once      01/17/18 1901          Thank you for your consult. I will follow-up with patient. Please contact us if you have any additional questions.    Migel Betancourt NP  Cardiology   [facility]

## 2018-01-18 NOTE — ASSESSMENT & PLAN NOTE
Related to (etiology):   Chronic illness; PMH    Signs and Symptoms (as evidenced by):   Cachexia; low BMI    Interventions/Recommendations (treatment strategy):  See recs    Nutrition Diagnosis Status:   New

## 2018-01-18 NOTE — H&P
[facility] Internal Medicine History and Physical - Resident Note    Admitting Team: [facility] Team A  Attending Physician: Yahir  Resident: Andrew  Interns: David    Date of Admit: 1/17/2018    Chief Complaint     Shortness of Breath (Reports lethargy, generalized weakness, SOB, confusion. Hx of collapsed lung and multiple bowel obstructions. Has ostomy. Pt AAOx4. )   for 2 days.    Subjective:      History of Present Illness:  Moises Hernandez is a 82 y.o. male who  has a past medical history of C. difficile diarrhea and COPD (chronic obstructive pulmonary disease).. The patient presented to [facility] on 1/17/2018 with a primary complaint of Shortness of Breath (Reports lethargy, generalized weakness, SOB, confusion. Hx of collapsed lung and multiple bowel obstructions. Has ostomy. Pt AAOx4. )    This is a 82 y.o. Male with a history of chronic massive right pneumothorax ( R lung totally collapsed) , COPD, intermittent SBO, severe C.diff s/p colectomy with ileostomy who presented with AMS and SOB for the last 3 days. Pt was recently discharged from the hospital on 1/2/18 for SBO which was treated conservatively . He was placed on TPN and discharged. Pt is confused and no history can be obtained from him. Daughter stated that since discharge from hospital he was not feeling well and was having intermittent dry cough but no fever or chills. No nausea or vomiting. Last night patient spiked a temperature at home and was confused and was not himself. Daughter decided to bring him to the hospital. Pt 's daughter and son in law who he lives with had the flu. Pt is up to date with his immunization.     Pertinent positives and negatives are listed in HPI.  All other systems are reviewed and are negative.    Past Medical History:  Past Medical History:   Diagnosis Date    C. difficile diarrhea     COPD (chronic obstructive pulmonary disease)        Past Surgical History:  Past Surgical History:   Procedure  "Laterality Date    ABDOMINAL SURGERY      EYE SURGERY      HERNIA REPAIR      ILEOSTOMY      SPLENECTOMY, TOTAL         Allergies:  Review of patient's allergies indicates:  No Known Allergies    Home Medications:  Prior to Admission medications    Medication Sig Start Date End Date Taking? Authorizing Provider   hydrocodone-acetaminophen 5-325mg (NORCO) 5-325 mg per tablet Take 1 tablet by mouth every 6 (six) hours as needed for Pain. 1/15/18   Piero Ugarte MD   ondansetron (ZOFRAN-ODT) 4 MG TbDL Take 2 tablets (8 mg total) by mouth every 8 (eight) hours as needed. 17   Piero Ugarte MD   pantoprazole (PROTONIX) 20 MG tablet Take 20 mg by mouth once daily.    Historical Provider, MD   PROAIR HFA 90 mcg/actuation inhaler every 4 (four) hours as needed.  16   Historical Provider, MD       Family History:  Family History   Problem Relation Age of Onset    Cancer Mother      stomach    Heart attack Father        Social History:  Social History   Substance Use Topics    Smoking status: Former Smoker     Packs/day: 1.00     Years: 60.00     Types: Cigarettes     Quit date:     Smokeless tobacco: Never Used    Alcohol use Yes       Review of Systems:  Pertinent items are noted in HPI. All other systems are reviewed and are negative.    Health Maintaince :   Primary Care Physician: Matthew  Immunizations:   TDap is up to date  Influenza is up to date  Pneumovax is up to date  Cancer Screening:  Colonoscopy: is up to date.      Objective:   Last 24 Hour Vital Signs:  BP  Min: 90/54  Max: 90/54  Temp  Av.7 °F (36.5 °C)  Min: 97.7 °F (36.5 °C)  Max: 97.7 °F (36.5 °C)  Pulse  Av.5  Min: 113  Max: 114  Resp  Av  Min: 24  Max: 24  SpO2  Av.5 %  Min: 92 %  Max: 97 %  Height  Av' 7" (170.2 cm)  Min: 5' 7" (170.2 cm)  Max: 5' 7" (170.2 cm)  Weight  Av.3 kg (91 lb)  Min: 41.3 kg (91 lb)  Max: 41.3 kg (91 lb)  Body mass index is 14.25 kg/m².  No intake/output data " recorded.    Physical Examination:    General appearance: chronically ill patient, cachectic, frail, confused.    HEENT: conjunctivae/corneas clear. PERRL, EOM's intact, dry mucous membrane.  Neck: supple, no adenopathy, no carotid bruit, no JVD, trachea midline.  Lungs: Absent breath sound on right side, hyperresonant on percussion. Left side CTA, no wheezes or crackles.  Heart: RRR, decreased HS, no murmur, click, rub or gallop  Abdomen: Ileostomy in place with brown output. soft, non-tender; bowel sounds normal; no masses, no organomegaly  Neuro: CN II-XII grossly normal. No focal motor or sensory deficit.   Extremities: Appears normal. 2+ pulse. No edema. Decreased ROM on left knee due to pain.  Skin: dry skin and color. No rashes or lesions      Laboratory:  Most Recent Data:  CBC: Lab Results   Component Value Date    WBC 32.55 (H) 01/17/2018    HGB 12.3 (L) 01/17/2018    HCT 35.3 (L) 01/17/2018     01/17/2018    MCV 89 01/17/2018    RDW 12.8 01/17/2018     BMP: Lab Results   Component Value Date     (LL) 01/17/2018    K 4.0 01/17/2018    CL 88 (L) 01/17/2018    CO2 17 (L) 01/17/2018    BUN 76 (H) 01/17/2018    CREATININE 1.4 01/17/2018     (H) 01/17/2018    CALCIUM 9.3 01/17/2018    MG 2.2 01/17/2018    PHOS 4.3 01/17/2018     LFTs: Lab Results   Component Value Date    PROT 7.1 01/17/2018    ALBUMIN 2.6 (L) 01/17/2018    BILITOT 0.6 01/17/2018    AST 76 (H) 01/17/2018    ALKPHOS 115 01/17/2018    ALT 33 01/17/2018     Coags:   Lab Results   Component Value Date    INR 1.1 01/17/2018     FLP: Lab Results   Component Value Date    TRIG 46 12/08/2017     DM: Lab Results   Component Value Date    CREATININE 1.4 01/17/2018     Thyroid: Lab Results   Component Value Date    TSH 0.742 01/17/2018     Anemia: Lab Results   Component Value Date    FERRITIN 193 12/10/2017    VPGYGPBG87 569 12/10/2017    FOLATE 15.3 12/10/2017     Cardiac: Lab Results   Component Value Date    TROPONINI 0.146 (H)  01/17/2018     (H) 01/17/2018     Urinalysis: Lab Results   Component Value Date    LABURIN  12/12/2017     Multiple organisms isolated. None in predominance.  Repeat if    LABURIN clinically necessary. 12/12/2017    COLORU Yellow 01/17/2018    SPECGRAV 1.015 01/17/2018    NITRITE Negative 01/17/2018    KETONESU Negative 01/17/2018    UROBILINOGEN Negative 01/17/2018    WBCUA 5 01/01/2016       Trended Lab Data:    Recent Labs  Lab 01/17/18  1540   WBC 32.55*   HGB 12.3*   HCT 35.3*      MCV 89   RDW 12.8   *   K 4.0   CL 88*   CO2 17*   BUN 76*   CREATININE 1.4   *   PROT 7.1   ALBUMIN 2.6*   BILITOT 0.6   AST 76*   ALKPHOS 115   ALT 33       Trended Cardiac Data:    Recent Labs  Lab 01/17/18  1540   TROPONINI 0.146*   *       Microbiology Data:  Bcx and Ucx sent    Other Laboratory Data:  Na 119    Other Results:  EKG (my interpretation): Sinus tachycardia,     Radiology:  Imaging Results          CT Head Without Contrast (Final result)  Result time 01/17/18 17:36:11    Final result by Santo Hamilton MD (01/17/18 17:36:11)                 Impression:       No acute intracranial abnormalities identified.      Electronically signed by: SANTO HAMILTON MD  Date:     01/17/18  Time:    17:36              Narrative:    Exam: CT of the head without contrast -- 82-year-old male with altered mental status.    Comparison: None.    Technique: 5 mm noncontrast axial images through the brain were obtained.    Findings: There is mild age-appropriate generalized cerebral volume loss and chronic microvascular ischemic change.  No evidence of acute/recent major vascular distribution cerebral infarction, intraparenchymal hemorrhage, or intra-axial space occupying lesion. The ventricular system is normal in size and configuration with no evidence of hydrocephalus. No effacement of the skull-base cisterns. No abnormal extra-axial fluid collections or blood products. The visualized paranasal sinuses  and mastoid air cells are clear. The calvarium shows no significant abnormality.  Right eye prosthesis is noted.                             X-Ray Chest AP Portable (Final result)  Result time 01/17/18 15:37:20    Final result by Daryl Mesa MD (01/17/18 15:37:20)                 Impression:        Stable hyperinflation of the left lung with coarsened interstitial opacities, likely reflecting sequela of emphysema/COPD.    Stable massive right pneumothorax.      Electronically signed by: Daryl Mesa  Date:     01/17/18  Time:    15:37              Narrative:    COMPARISON: 12/29/2017.    FINDINGS: One view of the chest was obtained.  Right-sided PICC is unchanged in position.  Stable appearance of a massive right-sided pneumothorax with complete collapse of the right lung. The trachea is midline. Left lung is hyperinflated and demonstrates coarsened interstitial markings and reticular opacities, similar in appearance to the prior exam. Note is again made of left apical scarring or subsegmental atelectasis. No large focal consolidation. Surgical clips overlie the upper abdomen.                                 Assessment:     Moises Hernandez is a 82 y.o. male with:  Patient Active Problem List    Diagnosis Date Noted    Influenza A 01/17/2018    Intestinal adhesions with partial obstruction 12/17/2017    SBO (small bowel obstruction) 12/11/2017    Cough 12/10/2017    Status post chest tube placement     Chronic pneumothorax 12/08/2017    Hyponatremia 12/08/2017    Metabolic acidosis 12/08/2017    Failure to thrive in adult 12/08/2017    Small bowel obstruction 05/05/2017    Abdominal pain 05/04/2017    Pneumothorax 05/04/2017    Early satiety 04/28/2016    Decreased appetite 04/20/2016    Abdominal bloating 04/20/2016    Nausea 04/20/2016    Attention to ileostomy 02/03/2016    History of creation of ostomy 01/19/2016    S/p total colectomy ([place]) 01/19/2016    Tobacco abuse  12/31/2015    Physical deconditioning 12/29/2015    COPD (chronic obstructive pulmonary disease) with acute bronchitis         Plan:     Sepsis due to Influenza A  - Presented with SOB, , RR 24, WBC 32, Lactate 1.9  - Received 1.5L of NS in ED with improvement in vitals.  - Will check Procalcitonin, Bcx, Ucx.  - Will continue with IVF  - Started on broad spectrum Abx awaiting Cx.  - Started on Tamiflu adjusted for Cr Cl.      Metabolic encephalopathy  - Secondary to sepsis  - Pt with 1 day of AMS  - Will continue with fluids and Abx  - Monitor      Non anion gap metabolic acidosis   - Likely due to volume depletion.  - Will continue with fluids and monitor.      Hypovolemic hyponatremia   - Pt is clinically hypovolumic.  - Will check urine osmolality.  - Will continue with fluids and monitor     ABRAHAM on CKD2  - baseline Cr is 0.7-0.9  - Cr is 1.4.  - Likely pre renal.  - Started on IVF  - monitor BMP.    Anemia  - Previous MOURA with AoCD  - No signs of bleeding  - Will monitor.      Protein-caloric malnutrition  - Pt with decreased PO intake due to fear of eating because of multiple SBO.  - Albumin is 2.6  - He was discharged on TPN last admission.  - Will stop TPN and start clear diet and advance as tolerated.  - Will order prealbumin, CRP and consult nutrition.      Physical decondition  - due to hypovolemia and malnutrition.  - Will consult PT/OT.       COPD  - Stable.  - Continue with home inhalers    Right Chronic Pneumothorax   - Pt with tension pneumothorax.  - Right lung is totally collapsed.        Diet: Clear diet and advance as tolerated.  Ppx: [facility]  Dispo: awaiting clinical improvement.      Code Status:     Full     Tisha Hernandez MD  [facility] Internal Medicine HO-I  [facility] IM Service    [facility] Medicine Hospitalist Pager numbers:   [facility] Hospitalist Medicine Team A (Yahir/Catrachita): [phone]  [facility] Hospitalist Medicine Team B (Yvette/Karine):  [phone]

## 2018-01-18 NOTE — PLAN OF CARE
Physicians paged. Dr. Domínguez made aware that patient short of breath while on non rebreather. Physician to come assess patient. Order received to stop Normal saline bolus, bolus stopped at this time.

## 2018-01-18 NOTE — PLAN OF CARE
Physicians at bedside to assess patient. Order received to STAT ABGs and stat chest xray. Respiratory at bedside radiology paged

## 2018-01-18 NOTE — EICU
eICU Note :    Called by the [facility]/RT   CXR : Stable large R pneumothorax  Problem: ABG 7.25/31/102/-14/14/97%    Pertinent History and labs reviewed :82-year-old male with stable right sided Pneumothorax with  Flu like  Symptoms,Admitted with hyponatremia, sodium of 119, placed on nonvrebreather with 82%saturation.  Patient has a decrease in BP in the 70's ,responded to 1.5 L bolus.On the monitor , pt has STT changes ,12 lead EKG being done. The bedside, M.D. Is discussing  The CODE STATUS with the  Labs: Increased procalcitonin    Treatment /Intervention given:1. Metabolic Acidosis : 1 amp of bicarb and bicarbonate GTT  2. STT changes ,possible MI :12 lead EKG pending  3.Chronic Stable R pneumothorax  4.Increased pro calcitonin in light of the Flu.Started on Vancomycin and Cefepime         Claire Wall M.D  eICU Physician

## 2018-01-18 NOTE — ANESTHESIA PROCEDURE NOTES
Intubation    Diagnosis: STEMI  Patient location during procedure: ICU  Procedure start time: 1/18/2018 10:30 PM  Timeout: 1/18/2018 10:30 PM  Procedure end time: 1/18/2018 10:35 PM  Staffing  Anesthesiologist: JOSY PITTS  Performed: anesthesiologist   Anesthesiologist was present at the time of the procedure.  Preanesthetic Checklist  Completed: patient identified, site marked, surgical consent, pre-op evaluation, timeout performed, IV checked, risks and benefits discussed, monitors and equipment checked and anesthesia consent given  Intubation  Indication: airway protection, respiratory distress  Pre-oxygenation. Induction: intravenous,  Intubation: postinduction, laryngoscopy direct, Chase 3.  Endotracheal Tube: oral, 7.5 mm ID, cuffed (inflated to minimal occlusive pressure)  Attempts: 1, Grade I - full view of cords  Complicating Factors: none  Tube secured at 23 cm at the lips.  Findings post-intubation: bilateral breath sounds, positive ETCO2, atraumatic / condition of teeth unchanged

## 2018-01-18 NOTE — PROGRESS NOTES
Physical Therapy      Moises Wander Hernandez   MRN: [id number]   Pt now intubated; placed on nsg hold today; will follow up.  Lexii Carlos, PT  1/18/2018

## 2018-01-18 NOTE — PLAN OF CARE
Patient transferred to room 265, family aware of plan of care. Patient remains on non rebreather at time of transport. Remains AAOx4

## 2018-01-18 NOTE — HPI
Moises Hernandez is a 82 y.o. male with COPD, chronic large right pneumothorax, failure to thrive- on TPN, SBO, CKD. The patient presented to the ED with SOB, lethargy, generalized weakness,  confusion. HPI retrieved from chart as patient is intubated and sedated.   Patient was discharged from the hospital on 1/2/18 for SBO which was treated conservatively . He was placed on TPN and discharged. Patient reportedly feeling poorly since discharge with dry cough, fever and confusion. Influenza A positive. Patient decompensated overnight and required intubation. BP low 70s-80s. HR elevated 110s-130s. TNI 0.4, ST depression anterolateral leads which improved on subsequent ECG. Blood cx with Gm + cocci resembling staph- source suspected PICC. Lactate 3.8 with procal of 7. Patient on Tamiflu, Cefepime, Zithromax, and Vanc.  Patient is DNR.

## 2018-01-18 NOTE — PLAN OF CARE
Problem: Restraint, Nonbehavioral (nonviolent)  Goal: Clinical Justification  Outcome: Ongoing (interventions implemented as appropriate)  Family verbalizes understanding

## 2018-01-19 PROBLEM — Z71.89 GOALS OF CARE, COUNSELING/DISCUSSION: Status: ACTIVE | Noted: 2018-01-01

## 2018-01-19 PROBLEM — Z51.5 PALLIATIVE CARE ENCOUNTER: Status: ACTIVE | Noted: 2018-01-01

## 2018-01-19 PROBLEM — R74.01 TRANSAMINITIS: Status: ACTIVE | Noted: 2018-01-01

## 2018-01-19 PROBLEM — B95.61 STAPHYLOCOCCUS AUREUS BACTEREMIA: Status: ACTIVE | Noted: 2018-01-01

## 2018-01-19 PROBLEM — E44.0 MALNUTRITION OF MODERATE DEGREE: Status: ACTIVE | Noted: 2018-01-01

## 2018-01-19 NOTE — PLAN OF CARE
Problem: Patient Care Overview  Goal: Plan of Care Review  Pt received on  mechanical ventilator - settings as documented in flow sheets.  Bag and mask at bedside, alarms on and functioning with adequate volume. Pt stable on ventilator.  Will continue to monitor.

## 2018-01-19 NOTE — SIGNIFICANT EVENT
[facility] Hospitalist Medicine Death Note    Called to beside by RN for pt asystole after terminal extubation.  Evaluated, noted to be pulseless and apneic, asystole on the monitor.  Time of death 16:31.  Pt's family aware and at bedside at time of death.      Sunshine Cr  [facility] Internal Medicine-Emergency Medicine HOV  [facility] Hospitalist Medicine Team A

## 2018-01-19 NOTE — PROGRESS NOTES
"[facility] Medicine Resident HO-3 Progress Note    Subjective:      Discussed the case with the patient's family this morning and they again stated that they would not like any escalation in care. They do not want a central line placed. They do not want another pressor. They are wanting to transition to comfort care today.      Objective:   Last 24 Hour Vital Signs:  BP  Min: 65/30  Max: 130/57  Temp  Av.4 °F (36.3 °C)  Min: 96.4 °F (35.8 °C)  Max: 98.1 °F (36.7 °C)  Pulse  Av.7  Min: 66  Max: 122  Resp  Av.6  Min: 15  Max: 35  SpO2  Av %  Min: 94 %  Max: 100 %  Height  Av' 7" (170.2 cm)  Min: 5' 7" (170.2 cm)  Max: 5' 7" (170.2 cm)  Weight  Av.7 kg (100 lb 12 oz)  Min: 43.9 kg (96 lb 12.5 oz)  Max: 46.6 kg (102 lb 11.8 oz)  I/O last 3 completed shifts:  In: 3885.2 [I.V.:3185.2; IV Piggyback:700]  Out: 270 [Urine:235; Stool:35]    Physical Examination:  General: intubated and , No Acute Distress, sedated   Head: normocephalic, atraumatic  Eyes: PERRL, EOMI, no scleral icterus, no conjunctival pallor  Nose: no tenderness to palpation, no drainage or erythema appreciated  Mouth and Throat: poor dentition, no lesions noted, moist mucus membranes  Neck: no lymphadenopathy appreciated, No carotid bruits, JVD low  Respiratory: lung sounds absent on the right, bronchial breath sounds on the left.   Cardiovascular:  Tachy with regular rhythm, no murmurs, rubs, or S3, S4, normal apical impulse.  Gastrointestinal: soft, nontender, nondistended,no rebound or guarding, normoactive bowel sounds.   Extremities: pulses 2+ in all extremities, no pitting edema, normal ROM   Neuro:  full strength even in all extremities, no sensory deficits.   Skin: no lesions, rashes, or breakdown.     Laboratory:  Laboratory Data Reviewed: yes  Pertinent Findings:  Lab Results   Component Value Date    WBC 16.10 (H) 2018    HGB 10.7 (L) 2018    HCT 31.0 (L) 2018    MCV 91 2018     (L) 2018 "     CMP  Sodium   Date Value Ref Range Status   01/19/2018 126 (L) 136 - 145 mmol/L Final     Potassium   Date Value Ref Range Status   01/19/2018 4.4 3.5 - 5.1 mmol/L Final     Chloride   Date Value Ref Range Status   01/19/2018 92 (L) 95 - 110 mmol/L Final     CO2   Date Value Ref Range Status   01/19/2018 18 (L) 23 - 29 mmol/L Final     Glucose   Date Value Ref Range Status   01/19/2018 106 70 - 110 mg/dL Final     BUN, Bld   Date Value Ref Range Status   01/19/2018 92 (H) 8 - 23 mg/dL Final     Creatinine   Date Value Ref Range Status   01/19/2018 2.6 (H) 0.5 - 1.4 mg/dL Final     Calcium   Date Value Ref Range Status   01/19/2018 8.3 (L) 8.7 - 10.5 mg/dL Final     Total Protein   Date Value Ref Range Status   01/19/2018 6.0 6.0 - 8.4 g/dL Final     Albumin   Date Value Ref Range Status   01/19/2018 2.2 (L) 3.5 - 5.2 g/dL Final     Total Bilirubin   Date Value Ref Range Status   01/19/2018 1.5 (H) 0.1 - 1.0 mg/dL Final     Comment:     For infants and newborns, interpretation of results should be based  on gestational age, weight and in agreement with clinical  observations.  Premature Infant recommended reference ranges:  Up to 24 hours.............<8.0 mg/dL  Up to 48 hours............<12.0 mg/dL  3-5 days..................<15.0 mg/dL  6-29 days.................<15.0 mg/dL       Alkaline Phosphatase   Date Value Ref Range Status   01/19/2018 100 55 - 135 U/L Final     AST   Date Value Ref Range Status   01/19/2018 191 (H) 10 - 40 U/L Final     ALT   Date Value Ref Range Status   01/19/2018 71 (H) 10 - 44 U/L Final     Anion Gap   Date Value Ref Range Status   01/19/2018 16 8 - 16 mmol/L Final     eGFR if    Date Value Ref Range Status   01/19/2018 26 (A) >60 mL/min/1.73 m^2 Final     eGFR if non    Date Value Ref Range Status   01/19/2018 22 (A) >60 mL/min/1.73 m^2 Final     Comment:     Calculation used to obtain the estimated glomerular filtration  rate (eGFR) is the CKD-EPI  equation.            Microbiology Data Reviewed: yes  Pertinent Findings:  Procedure Component Value Units Date/Time   Blood culture [[id number]] Collected: 01/18/18 1703   Order Status: Completed Specimen: Blood Updated: 01/19/18 0345    Blood Culture, Routine No Growth to date   Narrative:     Collection has been rescheduled by AKA at 1/18/2018 13:22 Reason: the   nurse will call about the lab work later  Collection has been rescheduled by AKA at 1/18/2018 13:22 Reason: the   nurse will call about the lab work later   Blood culture [[id number]] Collected: 01/18/18 1702   Order Status: Completed Specimen: Blood Updated: 01/19/18 0345    Blood Culture, Routine No Growth to date   Narrative:     Collection has been rescheduled by AKA at 1/18/2018 13:22 Reason: the   nurse will call about the lab work later  Collection has been rescheduled by AKA at 1/18/2018 13:22 Reason: the   nurse will call about the lab work later   Blood culture x two cultures. Draw prior to antibiotics. [[id number]] Collected: 01/17/18 1702   Order Status: Completed Specimen: Blood from Line, PICC Right Brachial Updated: 01/18/18 0407    Blood Culture, Routine Gram stain aer bottle: Gram positive cocci in clusters resembling Staph     Blood Culture, Routine Gram stain ab bottle: Gram positive cocci in clusters resembling Staph     Blood Culture, Routine Results called to and read back by:Brittney Bruce RN 01/18/2018  04:06   Narrative:     Aerobic and anaerobic   Blood culture x two cultures. Draw prior to antibiotics. [[id number]] Collected: 01/17/18 1540   Order Status: Completed Specimen: Blood from Peripheral, Antecubital, Left Updated: 01/18/18 0406    Blood Culture, Routine Gram stain aer bottle: Gram positive cocci in clusters resembling Staph     Blood Culture, Routine Gram stain ab bottle: Gram positive cocci in clusters resembling Staph     Blood Culture, Routine Results called to and read back by:Brittney Bruce RN 01/18/2018  04:06          Other Results:  EKG (my interpretation): sharp ST depression in the lateral leads     Radiology Data Reviewed: yes  Pertinent Findings:      Current Medications:     Infusions:   sodium chloride 0.9% 100 mL/hr at 18 0330    norepinephrine bitartrate-D5W 0.77 mcg/kg/min (18 0421)    propofol 0.8 mcg/kg/min (18 0555)        Scheduled:   atorvastatin  40 mg Oral Daily    azithromycin  500 mg Intravenous Q24H    ceFEPime (MAXIPIME) IVPB  1 g Intravenous Q8H    fentaNYL  100 mcg Intravenous Once    heparin (porcine)  5,000 Units Subcutaneous Q8H    oseltamivir  30 mg Oral Daily    sodium bicarbonate  50 mEq Intravenous Once    vancomycin (VANCOCIN) IVPB  15 mg/kg Intravenous Q24H        PRN:  [] fentaNYL **FOLLOWED BY** fentaNYL    Antibiotics and Day Number of Therapy:  Vanc, cefepime, azithro     Lines and Day Number of Therapy:  PIV x2    Assessment:     Moises Hernandez is a 82 y.o.male with  Patient Active Problem List    Diagnosis Date Noted    Gram-positive cocci bacteremia 2018    Severe sepsis 2018    Altered mental status 2018    Bacteremia 2018    Elevated troponin 2018    Sepsis 2018    Volume depletion 2018    Influenza A 2018    Sepsis, viral 2018    Sepsis with metabolic encephalopathy 2018    Protein-calorie malnutrition 2018    Acute kidney injury superimposed on CKD 2018    Intestinal adhesions with partial obstruction 2017    SBO (small bowel obstruction) 2017    Cough 12/10/2017    Status post chest tube placement     Chronic pneumothorax 2017    Hyponatremia 2017    Metabolic acidosis, normal anion gap (NAG) 2017    Failure to thrive in adult 2017    Small bowel obstruction 2017    Abdominal pain 2017    Pneumothorax 2017    Early satiety 2016    Decreased appetite 2016    Abdominal bloating  04/20/2016    Nausea 04/20/2016    Attention to ileostomy 02/03/2016    History of creation of ostomy 01/19/2016    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  - procal 7, blood with GPC in clusters   - continue abx, intubated, continue tamiflu   - will not escalate past levophed, remains DNR   - family to discuss comfort measures      Metabolic encephalopathy  - Secondary to sepsis and low Na   - now intubated and sedated.      NSTEMi  - presumed demand ischemia   - trop continues to climb now 0.45   - EKG with deep ST depressions, now resolved  - now with reduced EF of 35%   - requiring pressors for BP control      Non anion gap metabolic acidosis   - lactic acid climbing   - resolving with fluids and antibiotics      Right Chronic Pneumothorax   - Pt with tension pneumothorax.  - Right lung is totally collapsed.     Hypovolemic hyponatremia   - Na improving 126   - AM Na pending      ABRAHAM on CKD2  - baseline Cr is 0.7-0.9  - Cr is 1.4.  - Started on IVF     Anemia  - Previous MOURA with AoCD  - h/h stable, No signs of bleeding     Protein-caloric malnutrition  - Pt with decreased PO intake due to fear of eating because of multiple SBO.  - Albumin is 2.6  - He was discharged on TPN last admission.  - stoped TPN, OG tube in place   - Will order prealbumin, CRP and consult nutrition.     Physical decondition  - due to hypovolemia and malnutrition.  - Will consult PT/OT.     COPD  - Stable.  - Continue with home inhalers     CODE : DNR   Diet: NPO  Ppx: SQH  Dispo: intubated, grave prognosis, continue family discussions        Espinoza Morales  [facility] Internal Medicine -3  [facility] Med Service Team A    [facility]  Medicine Hospitalist Pager numbers:   [facility] Hospitalist Medicine Team A (Yahir/Catrachita): [phone]  [facility] Hospitalist Medicine Team B (Yvette/Karine):  [phone]

## 2018-01-19 NOTE — CONSULTS
Consult Note  Palliative Care    Thank you for opportunity to participate in Mr. Hernandez's care    Consult Requested By: Neda Sinclair MD  Reason for Consult: Goals of care discussion/advance care planning    SUBJECTIVE:     History of Present Illness:  Disease Process: Advanced Respiratory Disease, Advanced Cardiac Disease, Failure to Thrive        Mioses Hernandez is a 82 y.o. male with PMH of chronic massive right pneumothorax,  intermittent SBO, COPD (chronic obstructive pulmonary disease), multiple bowel obstructions, silvia of hx C. difficile diarrhea (s/p colectomy with ileostomy), presented to [facility] on 2018 with 3 days history of  lethargy, generalized weakness, SOB, confusion. Patient's daughter noted patient spiked fever the day prior. Patient lives with daughter and son in law and they both had the flu. Patient is up to date with his immunization. Patient was recently discharged from the hospital on 18 for SBO which was treated conservatively and discharge on TPN    .   Palliative  has been consulted for goals of care discussion/advance care planning    Palliative care met with patient's family- a daughter Laura (MPOA), 3 sons- Charity Johnston David daughter-in-law Katie, son-in-law Kne today to establish rapport, provide education about palliative care (communication, coordination of care, symptom management, goals of care) and assess family understanding of current clinical condition and its relationship to goals of care. Laura spoke and others chipped with very clear understanding of Mr. Hernandez's current clinical condition. Family stated patient is declining and not sure patient will be able to have a meaningful quality of life. Family had a discussion with patient grandson who is a Med[employer] physician working in the ER at [place], he gave family an end of life expectations.  Family had several question from clinical to emotional concerns and were all address. Education on comfort  measures were given. Emotional comfort given.           Past Medical History:   Diagnosis Date    C. difficile diarrhea     COPD (chronic obstructive pulmonary disease)      Past Surgical History:   Procedure Laterality Date    ABDOMINAL SURGERY      EYE SURGERY      HERNIA REPAIR      ILEOSTOMY      SPLENECTOMY, TOTAL       Family History   Problem Relation Age of Onset    Cancer Mother      stomach    Heart attack Father      Social History   Substance Use Topics    Smoking status: Former Smoker     Packs/day: 1.00     Years: 60.00     Types: Cigarettes     Quit date: 2015    Smokeless tobacco: Never Used    Alcohol use Yes       Mental Status: Non-responsive    ECOG Performance Status Grade: 4 - Completely disabled    Review of Systems:  Review of systems not obtained due to patient factors patient intubated.    OBJECTIVE:     Pain Assessment: unable to assess    Decision-Making Capacity: Family answered questions, Patient unable to communicate due to disease severity/cognitive impairment    Advanced Directives:  Living Will: No  Do Not Resuscitate Status: Yes  Medical Power of : Yes. Agent's Name: Laura . Agent's Contact Number:   Registered Organ Donor: No    Living Arrangements: Lives with family      ASSESSMENT/PLAN:       Recommendations:  Transition to comfort care  Code status: DNR  Discharge option- to be determined by clinical status after transition to comfort care.     Thank you for this consult and the opportunity to participate in Mr. Hernandez's care.    Ashanti Napier MD, MPH  Palliative Care Team   [phone]  [facility]       > 50% of 90 min visit spent in chart review, face to face discussion of goals of care with family, consulting services, symptom assessment, coordination of care and emotional support.

## 2018-01-19 NOTE — PROGRESS NOTES
Physical  / Occupational Therapy       Moises Hernandez   MRN: [id number]       Patient discussed in morning rounds with MD team reporting that patient is to transition to comfort care. PT and OT orders to evaluate and treat will be discontinued - this was approved by the MD team.  Yamila Rausch, PT  1/19/2018

## 2018-01-19 NOTE — PLAN OF CARE
Patient on vent not able to do assessment at this time.     01/19/18 0902   Discharge Assessment   Assessment Type Discharge Planning Assessment

## 2018-01-19 NOTE — PLAN OF CARE
18 1715   Final Note   Assessment Type Final Discharge Note   Discharge Disposition      Samantha Peterson, RN Transitional Navigator  [phone]

## 2018-01-19 NOTE — PLAN OF CARE
Chief Complaint      Shortness of Breath (Reports lethargy, generalized weakness, SOB, confusion. Hx of collapsed lung and multiple bowel obstructions. Has ostomy. Pt AAOx4. )   for 2 days.    Pt currently intubated, no answer at contact numbers, info obtained from Medical Record    Pt lives with adult daughter, Laura Yuen and son, Preston Davidson. Last admitted to [facility] on 12/28-12/18/18, sent home with Raul BERGMAN/[employer] on ABX. Pt has 3 in 1 commode, S. Cane, Shower Chair, Grab Bars and uses colostomy supplies from [employer], per prior notes, pt is never left unattended at home       01/19/18 0997   Discharge Assessment   Assessment Type Discharge Planning Assessment   Confirmed/corrected address and phone number on facesheet? Yes  (yes on last admit, 12/28/17)   Assessment information obtained from? Medical Record   Expected Length of Stay (days) (TBD)   Communicated expected length of stay with patient/caregiver no   Prior to hospitilization cognitive status: Alert/Oriented   Prior to hospitalization functional status: Assistive Equipment;Needs Assistance   Current cognitive status: Coma/Sedated/Intubated   Current Functional Status: Completely Dependent   Facility Arrived From: (home)   Lives With child(inna), adult  (Daughter: Laura Yuen [phone], Son: [phone])   Able to Return to Prior Arrangements unable to determine at this time (comments)   Is patient able to care for self after discharge? No   Who are your caregiver(s) and their phone number(s)? Daughter: Laura Yuen [phone]   Patient's perception of discharge disposition home health   Readmission Within The Last 30 Days other (see comments)  (last admit- [facility] 12/28-12/18/18 for ABD Pain/SBO)   Patient currently being followed by outpatient case management? Unable to determine (comments)   Patient currently receives any other outside agency services? No   Equipment Currently Used at Home 3-in-1 commode;cane,  straight;shower chair;grab bar   Do you have any problems affording any of your prescribed medications? No   Is the patient taking medications as prescribed? yes   Does the patient have transportation home? Yes   Transportation Available family or friend will provide   Dialysis Name and Scheduled days N/A   Does the patient receive services at the [facility]? No   Discharge Plan A Home Health   Discharge Plan B Other  (home with pallative care)   Patient/Family In Agreement With Plan unable to assess     Samantha Peterson RN Transitional Navigator  [phone]

## 2018-01-19 NOTE — NURSING
Family at bedside and ready to transition to comfort care. Pt terminally extubated per family request.

## 2018-01-19 NOTE — PHYSICIAN QUERY
"PT Name: Moises Hernandez  MR #: [id number]    Physician Query Form - Nutrition Clarification     CDS: Saarh Neal RN, CCDS         Contact information :[phone])  [email or web address]       This form is a permanent document in the medical record.     Query Date: 2018    By submitting this query, we are merely seeking further clarification of documentation.. Please utilize your independent clinical judgment when addressing the question(s) below.    The Medical record contains the following:   Indicators  Supporting Clinical Findings Location in Medical Record   x % of Estimated Energy Intake over a time frame from p.o., TF, or TPN "Energy Calories Required: not meeting needs  Protein Required: not meeting needs  IV Fluid (mL):  (NS @ 100ml/hr)  I/O: reviewed  Fluid Required:  (per MD)  Comments: LBM via ileostomy   % Intake of Estimated Energy Needs: 0 - 25 %  % Meal Intake: NPO " RD consult 18    Weight Status over a time frame     x Subcutaneous Fat and/or Muscle Loss "generalized wasting" RD consult 18    Fluid Accumulation or Edema      Reduced  Strength     x Wt / BMI / Usual Body Weight "BMI (Calculated): 15.2  BMI Grade: less than 16 protein-energy malnutrition grade III  Usual Body Weight (UBW), k kg  % Usual Body Weight: 104.74  % Weight Change From Usual Weight: 4.52 %" RD consult 18    Delayed Wound Healing / Failure to Thrive     x Acute or Chronic Illness "sepsis due to Influenza A, Metabolic encephalopathy,hyponatremia, ABRAHAM on CKD 2, anemia, deconditioning, COPD stable, right chronic pneumothorax" H&P 18    Medication      Treatment     x Other "Protein-caloric malnutrition  - Pt with decreased PO intake due to fear of eating because of multiple SBO.  - Albumin is 2.6  - He was discharged on TPN last admission.  - Will stop TPN and start clear diet and advance as tolerated.  - Will order prealbumin, CRP and consult nutrition." H&P 18 "     AND / ASPEN Clinical Characteristics (October 2011)  A minimum of two characteristics is recommended for diagnosing either moderate or severe malnutrition   Mild Malnutrition Moderate Malnutrition Severe Malnutrition   Energy Intake from p.o., TF or TPN. < 75% intake of estimated energy needs for less than 7 days < 75% intake of estimated energy needs for greater than 7 days < 50% intake of estimated energy needs for > 5 days   Weight Loss 1-2% in 1 month  5% in 3 months  7.5% in 6 months  10% in 1 year 1-2 % in 1 week  5% in 1 month  7.5% in 3 months  10% in 6 months  20% in 1 year > 2% in 1 week  > 5% in 1 month  > 7.5% in 3 months  > 10% in 6 months  > 20% in 1 year   Physical Findings     None *Mild subcutaneous fat and/or muscle loss  *Mild fluid accumulation  *Stage II decubitus  *Surgical wound or non-healing wound *Mod/severe subcutaneous fat and/or muscle loss  *Mod/severe fluid accumulation  *Stage III or IV decubitus  *Non-healing surgical wound     Provider, please specify diagnosis or diagnoses associated with above clinical findings.    [ ] Mild Protein-Calorie Malnutrition  [ ] Moderate Protein-Calorie Malnutrition  [X] Severe Protein-Calorie Malnutrition  [X] Cachexia  [ ] Anorexia  [ ] Abnormal Weight Loss  [X] Underweight  [ ] Other Nutritional Diagnosis (please specify): ____________________________________  [ ] Other: ________________________________  [ ] Clinically Undetermined    Please document in your progress notes daily for the duration of treatment until resolved and include in your discharge summary.

## 2018-01-19 NOTE — PLAN OF CARE
Pt intubated, no answer at contact numbers, info obtained from medical records       01/19/18 0944   Readmission Questionnaire   At the time of your discharge, did someone talk to you about what your health problems were? (MICHAELA)   At the time of discharge, did someone talk to you about what to watch out for regarding worsening of your health problem? (MICHAELA)   At the time of discharge, did someone talk to you about what to do if you experienced worsening of your health problem? (MICHAELA)   At the time of discharge, did someone talk to you about which medication to take when you left the hospital and which ones to stop taking? (MICHAELA)   At the time of discharge, did someone talk to you about when and where to follow up with a doctor after you left the hospital? (MICHAELA)   What do you believe caused you to be sick enough to be re-admitted? Shortness of breath   How often do you need to have someone help you when you read instructions, pamphlets, or other written material from your doctor or pharmacy? Never   Do you have problems taking your medications as prescribed? (MICHAELA)   Do you have any problems affording any of  your prescribed medications? (MICHAELA)   Do you have problems obtaining/receiving your medications? (MICHAELA)   Does the patient have transportation to healthcare appointments? (MICHAELA)   Lives With child(inna), adult  (Son: Preston Hernandez, Daughter: Laura Yuen)   Living Arrangements house   Does the patient have family/friends to help with healtcare needs after discharge? yes   Who are your caregiver(s) and their phone number(s)? Laura Yuen [phone]   Does your caregiver provide all the help you need? Yes  (someone is always with pt at home)   Are you currently feeling confused? (MICHAELA)   Are you currently having problems thinking? (MICHAELA)   Are you currently having memory problems? (MICHAELA)   Have you felt down, depressed, or hopeless? Unable to Assess   Have you felt little interest or pleasure in doing things? Unable to  assess   In the last 7 days, my sleep quality was: (MICHAELA)     Samantha Peterson, RN Transitional Navigator  [phone]

## 2018-01-19 NOTE — PLAN OF CARE
Levophed started for low BP via 18g ALEX arm PIV. MD's aware and have decided to not place central line until tomorrow. Aware of protocol. Family also educated to risk of infiltrating levophed. Informed of severe damage to tissues if this happens. Verbalized understanding and continues to want to take this chance. PIV checked frequented,

## 2018-01-20 LAB
BACTERIA BLD CULT: NORMAL

## 2018-01-20 NOTE — PLAN OF CARE
"  Moises Hernandez was identified as being in soft 2 point restraints within 24 hours of expiration.  This patient has been entered in the Restraint Mortality Log on [Friday 01/19/18 @ 2000]."    Олег Casas RN  "

## 2018-01-21 LAB
BACTERIA BLD CULT: NORMAL

## 2018-01-24 NOTE — DISCHARGE SUMMARY
[facility] Internal Medicine Death Summary    Primary Team: U Team A  Attending Physician: Catrachita  Resident: Andrew  Intern: David    Date of Admit: 1/17/2018    Time of Death: 16:31    Date of Death: 1/19/2018   Preliminary Cause of Death: Influenza pneumonia     Death Event Summary     Patient was admitted to the ICU for septic shock from influenza and MRSA bacteremia. He was electively intubated. Patient was on treatment with tamiflu and Vancomycin but continued to decline requiring pressors to be added. His family did not want any further invasive measures taken. The decision was made to transition to comfort measures and the patient was extubated. Time of death was called at 16:31     Necessary Events     Patient's Next of Kin/Emergency Contact Notified: yes    Relationship to patient: Patients Daughter and Sons notified in person     Patient's Attending Physician Notified: yes    's Office Notified: yes    MOUSTAPHA Notified at [phone]: yes    Autopsy Consent Form Completed?: yes    Family declined autopsy? yes         Espinoza Morales  [facility] Internal Medicine HO-3  1:07 PM

## 2018-01-25 LAB
BACTERIA BLD CULT: NORMAL

## 2021-02-12 NOTE — PLAN OF CARE
Airway    Date/Time: 2/12/2021 3:29 PM  Performed by: Mathew Maki M.D.  Authorized by: Mathew Maki M.D.     Location:  OR  Urgency:  Elective  Indications for Airway Management:  Anesthesia      Spontaneous Ventilation: absent    Sedation Level:  Deep  Preoxygenated: Yes    Patient Position:  Sniffing  Final Airway Type:  Endotracheal airway  Final Endotracheal Airway:  ETT  Cuffed: Yes    Technique Used for Successful ETT Placement:  Direct laryngoscopy  Devices/Methods Used in Placement:  Cricoid pressure    Insertion Site:  Oral  Blade Type:  Hernández  Laryngoscope Blade/Videolaryngoscope Blade Size:  2  ETT Size (mm):  7.0  Measured from:  Teeth  ETT to Teeth (cm):  22  Placement Verified by: auscultation and capnometry    Cormack-Lehane Classification:  Grade I - full view of glottis  Number of Attempts at Approach:  1           sats 87% on 5L NC. Patient placed on non rebreather. Patient remains shortn of breath

## 2022-04-15 NOTE — SUBJECTIVE & OBJECTIVE
Past Medical History:   Diagnosis Date    C. difficile diarrhea     COPD (chronic obstructive pulmonary disease)        Past Surgical History:   Procedure Laterality Date    ABDOMINAL SURGERY      EYE SURGERY      HERNIA REPAIR      ILEOSTOMY      SPLENECTOMY, TOTAL         Review of patient's allergies indicates:  No Known Allergies    No current facility-administered medications on file prior to encounter.      Current Outpatient Prescriptions on File Prior to Encounter   Medication Sig    hydrocodone-acetaminophen 5-325mg (NORCO) 5-325 mg per tablet Take 1 tablet by mouth every 6 (six) hours as needed for Pain.    ondansetron (ZOFRAN-ODT) 4 MG TbDL Take 2 tablets (8 mg total) by mouth every 8 (eight) hours as needed.    pantoprazole (PROTONIX) 20 MG tablet Take 20 mg by mouth once daily.    PROAIR HFA 90 mcg/actuation inhaler every 4 (four) hours as needed.      Family History     Problem Relation (Age of Onset)    Cancer Mother    Heart attack Father        Social History Main Topics    Smoking status: Former Smoker     Packs/day: 1.00     Years: 60.00     Types: Cigarettes     Quit date: 2015    Smokeless tobacco: Never Used    Alcohol use Yes    Drug use: No    Sexual activity: Not on file     Review of Systems   Unable to perform ROS: intubated     Objective:     Vital Signs (Most Recent):  Temp: 97.4 °F (36.3 °C) (01/18/18 0530)  Pulse: 103 (01/18/18 1221)  Resp: (!) 22 (01/18/18 1221)  BP: (!) 81/52 (01/18/18 1221)  SpO2: 100 % (01/18/18 1221) Vital Signs (24h Range):  Temp:  [97.4 °F (36.3 °C)-99.7 °F (37.6 °C)] 97.4 °F (36.3 °C)  Pulse:  [] 103  Resp:  [15-40] 22  SpO2:  [87 %-100 %] 100 %  BP: ()/(45-92) 81/52     Weight: 43.9 kg (96 lb 12.5 oz)  Body mass index is 15.16 kg/m².    SpO2: 100 %  O2 Device (Oxygen Therapy): ventilator      Intake/Output Summary (Last 24 hours) at 01/18/18 1237  Last data filed at 01/18/18 1000   Gross per 24 hour   Intake           917.39 ml    Output              125 ml   Net           792.39 ml       Lines/Drains/Airways     Peripherally Inserted Central Catheter Line                 PICC Double Lumen 12/28/17 1710 right brachial 20 days          Drain                 Colostomy -- days         Ileostomy 01/02/16  days         Chest Tube 12/08/17 1153 1 Right 10 Fr. 41 days         Urethral Catheter 01/17/18 2315 Coude less than 1 day          Airway                 Airway - Non-Surgical 01/17/18 2347 Endotracheal Tube less than 1 day                Physical Exam   Constitutional: He appears cachectic. He has a sickly appearance. He is sedated and intubated.   Eyes: Right eye exhibits no discharge. Left eye exhibits no discharge.   Neck: No JVD present.   Cardiovascular: Regular rhythm.   No extrasystoles are present. Tachycardia present.    Murmur heard.  High-pitched blowing holosystolic murmur is present  at the apex  Pulmonary/Chest: Tachypnea noted. He is intubated. He has decreased breath sounds in the right upper field, the right middle field and the right lower field.   Abdominal: Soft. Bowel sounds are decreased.   Musculoskeletal: He exhibits no edema.   Neurological:   Sedated    Skin: Skin is warm and dry.       Significant Labs:   ABG:   Recent Labs  Lab 01/17/18  2204   PH 7.246*   PCO2 31.3*   HCO3 13.6*   POCSATURATED 97   BE -14   , Blood Culture:   Recent Labs  Lab 01/17/18  1540 01/17/18  1702   LABBLOO Gram stain aer bottle: Gram positive cocci in clusters resembling Staph   Gram stain ab bottle: Gram positive cocci in clusters resembling Staph   Results called to and read back by:Brittney Bruce RN 01/18/2018  04:06 Gram stain aer bottle: Gram positive cocci in clusters resembling Staph   Gram stain ab bottle: Gram positive cocci in clusters resembling Staph   Results called to and read back by:Brittney Bruce RN 01/18/2018  04:06   , BMP:   Recent Labs  Lab 01/17/18  1540 01/17/18  2323   * 94   * 124*   K  4.0 3.8   CL 88* 93*   CO2 17* 17*   BUN 76* 72*   CREATININE 1.4 1.3   CALCIUM 9.3 8.7   MG 2.2  --    , CMP   Recent Labs  Lab 01/17/18  1540 01/17/18  2323   * 124*   K 4.0 3.8   CL 88* 93*   CO2 17* 17*   * 94   BUN 76* 72*   CREATININE 1.4 1.3   CALCIUM 9.3 8.7   PROT 7.1  --    ALBUMIN 2.6*  --    BILITOT 0.6  --    ALKPHOS 115  --    AST 76*  --    ALT 33  --    ANIONGAP 14 14   ESTGFRAFRICA 54* 60   EGFRNONAA 47* 52*   , CBC   Recent Labs  Lab 01/17/18  1540   WBC 32.55*   HGB 12.3*   HCT 35.3*      , INR   Recent Labs  Lab 01/17/18  1540   INR 1.1   , Troponin   Recent Labs  Lab 01/17/18  1540 01/17/18  2323   TROPONINI 0.146* 0.455*    and All pertinent lab results from the last 24 hours have been reviewed.    Significant Imaging: Echocardiogram:   2D echo with color flow doppler:   Results for orders placed or performed during the hospital encounter of 12/23/15   2D echo with color flow doppler   Result Value Ref Range    EF 55 55 - 65    Diastolic Dysfunction No     and X-Ray: CXR: X-Ray Chest 1 View (CXR):   Results for orders placed or performed during the hospital encounter of 01/17/18   X-Ray Chest 1 View    Narrative    Chest AP single view.  Comparison: 1/17/18 at 21:55.    Interval placement of endotracheal tube with distal tip seen within the mid to distal trachea.  Rosana is not well visualized.  Right-sided PICC line in stable position.  Redemonstration of large right-sided pneumothorax with mild leftward mediastinal shift.  No significant change in cardiopulmonary status from earlier exam.    Impression    Stable large right-sided pneumothorax.      Electronically signed by: SANTO HAMILTON MD  Date:     01/18/18  Time:    00:15       36.7

## 2023-01-03 NOTE — PROGRESS NOTES
Progress Note  Nephrology      Consult Requested By: Theodora Arciniega MD      SUBJECTIVE:     Overnight events  Patient is a 82 y.o. male     Patient Active Problem List   Diagnosis    COPD (chronic obstructive pulmonary disease) with acute bronchitis    Tobacco abuse    Physical deconditioning    History of creation of ostomy    S/p total colectomy (Arciniega)    Attention to ileostomy    Decreased appetite    Abdominal bloating    Nausea    Early satiety    Abdominal pain    Pneumothorax    Small bowel obstruction    Tension pneumothorax    Hyponatremia    Metabolic acidosis    Failure to thrive in adult    Cough    Status post chest tube placement     Past Medical History:   Diagnosis Date    C. difficile diarrhea     COPD (chronic obstructive pulmonary disease)               OBJECTIVE:     Vitals:    12/10/17 0503 12/10/17 0757 12/10/17 0929 12/10/17 1201   BP: (!) 97/53 (!) 89/54  (!) 97/53   BP Location: Right arm      Patient Position: Lying Lying     Pulse: 84 71  65   Resp: 18 16  14   Temp: 97.5 °F (36.4 °C) 98.5 °F (36.9 °C)  98.9 °F (37.2 °C)   TempSrc: Oral Oral     SpO2:   96%    Weight: 41 kg (90 lb 6.2 oz)      Height:           Temp: 98.9 °F (37.2 °C) (12/10/17 1201)  Pulse: 65 (12/10/17 1201)  Resp: 14 (12/10/17 1201)  BP: (!) 97/53 (12/10/17 1201)  SpO2: 96 % (12/10/17 0929)      Date 12/10/17 0700 - 12/11/17 0659   Shift 0557-4619 7710-2934 3409-4573 24 Hour Total   I  N  T  A  K  E   P.O. 485   485    Shift Total  (mL/kg) 485  (11.8)   485  (11.8)   O  U  T  P  U  T   Urine  (mL/kg/hr) 320  (1)   320    Shift Total  (mL/kg) 320  (7.8)   320  (7.8)   Weight (kg) 41 41 41 41             Medications:   ergocalciferol  50,000 Units Oral Q7 Days    pantoprazole  40 mg Oral Daily    sodium bicarbonate  1 tablet Oral QID      sodium chloride 0.9% 75 mL/hr at 12/10/17 1133    sodium bicarbonate drip 100 mL/hr at 12/09/17 1743               Physical Exam:  General appearance:  NAD  No SOB  Lungs: diminished breath sounds  Heart: Pulse 65  Abdomen: soft  Extremities: no edema  Skin: dry  Laboratory:  ABG  Labs reviewed  Recent Results (from the past 336 hour(s))   Basic metabolic panel    Collection Time: 12/10/17  4:48 AM   Result Value Ref Range    Sodium 137 136 - 145 mmol/L    Potassium 3.6 3.5 - 5.1 mmol/L    Chloride 108 95 - 110 mmol/L    CO2 23 23 - 29 mmol/L    BUN, Bld 17 8 - 23 mg/dL    Creatinine 0.7 0.5 - 1.4 mg/dL    Calcium 8.3 (L) 8.7 - 10.5 mg/dL    Anion Gap 6 (L) 8 - 16 mmol/L   Basic metabolic panel    Collection Time: 12/09/17  9:57 AM   Result Value Ref Range    Sodium 133 (L) 136 - 145 mmol/L    Potassium 4.3 3.5 - 5.1 mmol/L    Chloride 106 95 - 110 mmol/L    CO2 19 (L) 23 - 29 mmol/L    BUN, Bld 24 (H) 8 - 23 mg/dL    Creatinine 0.9 0.5 - 1.4 mg/dL    Calcium 9.2 8.7 - 10.5 mg/dL    Anion Gap 8 8 - 16 mmol/L     Recent Results (from the past 336 hour(s))   CBC auto differential    Collection Time: 12/09/17  9:58 AM   Result Value Ref Range    WBC 10.62 3.90 - 12.70 K/uL    Hemoglobin 12.9 (L) 14.0 - 18.0 g/dL    Hematocrit 38.0 (L) 40.0 - 54.0 %    Platelets 257 150 - 350 K/uL   CBC auto differential    Collection Time: 12/08/17 11:51 AM   Result Value Ref Range    WBC 19.55 (H) 3.90 - 12.70 K/uL    Hemoglobin 12.2 (L) 14.0 - 18.0 g/dL    Hematocrit 36.6 (L) 40.0 - 54.0 %    Platelets 235 150 - 350 K/uL   CBC W/ AUTO DIFFERENTIAL    Collection Time: 12/08/17  2:43 AM   Result Value Ref Range    WBC 17.53 (H) 3.90 - 12.70 K/uL    Hemoglobin 13.7 (L) 14.0 - 18.0 g/dL    Hematocrit 41.1 40.0 - 54.0 %    Platelets 268 150 - 350 K/uL     Urinalysis    Recent Labs  Lab 12/09/17 2017   COLORU Yellow   SPECGRAV >=1.030*   PHUR 6.0   PROTEINUA Trace*   NITRITE Negative   LEUKOCYTESUR Negative   UROBILINOGEN Negative       Diagnostic Results:  X-Ray: Reviewed  US: Reviewed  Echo: Reviewed  ACCESS    ASSESSMENT/PLAN:     ABRAHAM.   Improving.  Creatinine 0.7.   cc/24  h.  Na 137.  Non Gap Metabolic acidosis.   Anemia HB 12.9.  Poor nutrition .  Albumin 3.  Supplements.  BP 97/53.  EF 60 % in  2015.  Avoid hypotension, hypovolemia, nephrotoxic agents.  Sodium bicarbonate 650 mg tid.  KCL 20 meq elixir once.   Statement Selected

## 2023-01-19 NOTE — HPI
Moises Hernandez is a 82 y.o. 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  He also reports decreased appetite over the last couple of days.   He and his wife, who accompanies him, report that he has intermittent episodes like this, but they normally resolve after about a day or so.  This one was not seemingly resolving like normal and he was vomiting more, so they decided to seek evaluation.  On the way to the ED, he started dumping from his ileostomy, and in the ED he had 500 cc output.  While his abdomen felt better, he did still endorse some recent increase in baseline shortness of breath.  On abdominal x-ray, he was shown to have a large, possibly tension, pneumothorax on the right.  A pigtail catheter was placed.  Follow up imaging showed no significant re-expansion of the lung.  He and his wife report that he has had a chronically collapsed lung on that side, for which he underwent evaluation by Thoracic Surgery at [facility] and deemed not a surgical candidate, especially since he was able to oxygenate well on room air, despite his nearly completely collapsed right lung.     VTE Assessment already completed for this visit

## 2023-05-02 NOTE — PROGRESS NOTES
"[facility] Medicine Resident HO-3 Progress Note    Subjective:      Overnight mr thomas was intubated at the request of the patient and the family because of increased work of breathing. He is comfortable.      Objective:   Last 24 Hour Vital Signs:  BP  Min: 64/49  Max: 148/92  Temp  Av.2 °F (36.8 °C)  Min: 97.4 °F (36.3 °C)  Max: 99.7 °F (37.6 °C)  Pulse  Av.9  Min: 61  Max: 162  Resp  Av.7  Min: 15  Max: 40  SpO2  Av.4 %  Min: 87 %  Max: 100 %  Height  Av' 7" (170.2 cm)  Min: 5' 7" (170.2 cm)  Max: 5' 7" (170.2 cm)  Weight  Av.2 kg (92 lb 14.8 oz)  Min: 41.3 kg (91 lb)  Max: 43.9 kg (96 lb 12.5 oz)  I/O last 3 completed shifts:  In: 917.4 [I.V.:567.4; IV Piggyback:350]  Out: 100 [Urine:100]    Physical Examination:  General: intubated and , No Acute Distress, very cachetic   Head: normocephalic, atraumatic  Eyes: PERRL, EOMI, no scleral icterus, no conjunctival pallor  Nose: no tenderness to palpation, no drainage or erythema appreciated  Mouth and Throat: poor dentition, no lesions noted, moist mucus membranes  Neck: no lymphadenopathy appreciated, No carotid bruits, JVD low  Respiratory: lung sounds absent on the right, bronchial breath sounds on the left.   Cardiovascular: regular rate with regular rhythm, no murmurs, rubs, or S3, S4, normal apical impulse.  Gastrointestinal: soft, nontender, nondistended,no rebound or guarding, normoactive bowel sounds.   Extremities: pulses 2+ in all extremities, no pitting edema, normal ROM   Neuro:  full strength even in all extremities, no sensory deficits.   Skin: no lesions, rashes, or breakdown.     Laboratory:  Laboratory Data Reviewed: yes  Pertinent Findings:  Lab Results   Component Value Date    WBC 32.55 (H) 2018    HGB 12.3 (L) 2018    HCT 35.3 (L) 2018    MCV 89 2018     2018     CMP  Sodium   Date Value Ref Range Status   2018 124 (L) 136 - 145 mmol/L Final     Potassium   Date Value Ref Range " Detail Level: Detailed Quality 130: Documentation Of Current Medications In The Medical Record: Current Medications Documented Status   01/17/2018 3.8 3.5 - 5.1 mmol/L Final     Chloride   Date Value Ref Range Status   01/17/2018 93 (L) 95 - 110 mmol/L Final     CO2   Date Value Ref Range Status   01/17/2018 17 (L) 23 - 29 mmol/L Final     Glucose   Date Value Ref Range Status   01/17/2018 94 70 - 110 mg/dL Final     BUN, Bld   Date Value Ref Range Status   01/17/2018 72 (H) 8 - 23 mg/dL Final     Creatinine   Date Value Ref Range Status   01/17/2018 1.3 0.5 - 1.4 mg/dL Final     Calcium   Date Value Ref Range Status   01/17/2018 8.7 8.7 - 10.5 mg/dL Final     Total Protein   Date Value Ref Range Status   01/17/2018 7.1 6.0 - 8.4 g/dL Final     Albumin   Date Value Ref Range Status   01/17/2018 2.6 (L) 3.5 - 5.2 g/dL Final     Total Bilirubin   Date Value Ref Range Status   01/17/2018 0.6 0.1 - 1.0 mg/dL Final     Comment:     For infants and newborns, interpretation of results should be based  on gestational age, weight and in agreement with clinical  observations.  Premature Infant recommended reference ranges:  Up to 24 hours.............<8.0 mg/dL  Up to 48 hours............<12.0 mg/dL  3-5 days..................<15.0 mg/dL  6-29 days.................<15.0 mg/dL       Alkaline Phosphatase   Date Value Ref Range Status   01/17/2018 115 55 - 135 U/L Final     AST   Date Value Ref Range Status   01/17/2018 76 (H) 10 - 40 U/L Final     ALT   Date Value Ref Range Status   01/17/2018 33 10 - 44 U/L Final     Anion Gap   Date Value Ref Range Status   01/17/2018 14 8 - 16 mmol/L Final     eGFR if    Date Value Ref Range Status   01/17/2018 60 >60 mL/min/1.73 m^2 Final     eGFR if non    Date Value Ref Range Status   01/17/2018 52 (A) >60 mL/min/1.73 m^2 Final     Comment:     Calculation used to obtain the estimated glomerular filtration  rate (eGFR) is the CKD-EPI equation.            Microbiology Data Reviewed: yes  Pertinent Findings:  Blood culture x two cultures. Draw prior to antibiotics. [[id number]]  Collected: 18 1702   Order Status: Completed Specimen: Blood from Line, PICC Right Brachial Updated: 18 0407    Blood Culture, Routine Gram stain aer bottle: Gram positive cocci in clusters resembling Staph     Blood Culture, Routine Gram stain ab bottle: Gram positive cocci in clusters resembling Staph     Blood Culture, Routine Results called to and read back by:Brittney Bruce RN 2018  04:06   Narrative:     Aerobic and anaerobic   Blood culture x two cultures. Draw prior to antibiotics. [[id number]] Collected: 18 1540   Order Status: Completed Specimen: Blood from Peripheral, Antecubital, Left Updated: 18 0406    Blood Culture, Routine Gram stain aer bottle: Gram positive cocci in clusters resembling Staph     Blood Culture, Routine Gram stain ab bottle: Gram positive cocci in clusters resembling Staph     Blood Culture, Routine Results called to and read back by:Brittney Bruce RN 2018  04:06   Narrative:     Aerobic and anaerobic   Urine culture [[id number]] Collected: 17 1203   Order Status: Completed Specimen: Urine from Urine, Clean Catch Updated: 17 2147    Urine Culture, Routine Multiple organisms isolated. None in predominance.  Repeat if    Urine Culture, Routine clinically necessary.         Other Results:  EKG (my interpretation): sharp ST depression in the lateral leads     Radiology Data Reviewed: yes  Pertinent Findings:      Current Medications:     Infusions:   propofol 15 mcg/kg/min (18 0042)    sodium bicarbonate drip 75 mL/hr at 18 2244        Scheduled:   atorvastatin  40 mg Oral Daily    azithromycin  500 mg Intravenous Q24H    ceFEPime (MAXIPIME) IVPB  1 g Intravenous Q8H    fentaNYL  100 mcg Intravenous Once    heparin (porcine)  5,000 Units Subcutaneous Q8H    oseltamivir  30 mg Oral Daily    sodium bicarbonate  50 mEq Intravenous Once    vancomycin (VANCOCIN) IVPB  15 mg/kg Intravenous Q24H        PRN:  []  fentaNYL **FOLLOWED BY** fentaNYL    Antibiotics and Day Number of Therapy:  Vanc, cefepime, azithro     Lines and Day Number of Therapy:  PIV x2    Assessment:     Moises Hernandez is a 82 y.o.male with  Patient Active Problem List    Diagnosis Date Noted    Gram-positive cocci bacteremia 01/18/2018    Influenza A 01/17/2018    Sepsis, viral 01/17/2018    Sepsis with metabolic encephalopathy 01/17/2018    Protein-calorie malnutrition 01/17/2018    Acute kidney injury superimposed on CKD 01/17/2018    Intestinal adhesions with partial obstruction 12/17/2017    SBO (small bowel obstruction) 12/11/2017    Cough 12/10/2017    Status post chest tube placement     Chronic pneumothorax 12/08/2017    Hyponatremia 12/08/2017    Metabolic acidosis, normal anion gap (NAG) 12/08/2017    Failure to thrive in adult 12/08/2017    Small bowel obstruction 05/05/2017    Abdominal pain 05/04/2017    Pneumothorax 05/04/2017    Early satiety 04/28/2016    Decreased appetite 04/20/2016    Abdominal bloating 04/20/2016    Nausea 04/20/2016    Attention to ileostomy 02/03/2016    History of creation of ostomy 01/19/2016    S/p total colectomy (Arciniega) 01/19/2016    Tobacco abuse 12/31/2015    Physical deconditioning 12/29/2015    COPD (chronic obstructive pulmonary disease) with acute bronchitis         Plan:     Sepsis with gram positive cocci due to Influenza A  - Presented with SOB, , RR 24, WBC 32, Lactate 1.9  - Received 1.5L of NS in ED with improvement in vitals.  - procal 7, blood with GPC in clusters   - Will continue with bicarb infusion   - continue abx, intubated, continue tamiflu      Metabolic encephalopathy  - Secondary to sepsis  - now intubated and sedated.      NSTEMi  - presumed demand ischemia   - trop continues to climb now 0.45   - EKG with deep ST depressions, now resolved     Non anion gap metabolic acidosis   - lactic acid climbing   - continue sepsis treatment and continue  bicarb drip.      Right Chronic Pneumothorax   - Pt with tension pneumothorax.  - Right lung is totally collapsed.     Hypovolemic hyponatremia   - Na improving 124   - AM Na pending      ABRAHAM on CKD2  - baseline Cr is 0.7-0.9  - Cr is 1.4.  - Started on IVF     Anemia  - Previous MOURA with AoCD  - h/h stable, No signs of bleeding     Protein-caloric malnutrition  - Pt with decreased PO intake due to fear of eating because of multiple SBO.  - Albumin is 2.6  - He was discharged on TPN last admission.  - stoped TPN, OG tube in place   - Will order prealbumin, CRP and consult nutrition.     Physical decondition  - due to hypovolemia and malnutrition.  - Will consult PT/OT.     COPD  - Stable.  - Continue with home inhalers     CODE : DNR   Diet: NPO  Ppx: SQH  Dispo: intubated, grave prognosis, continue family discussions        Espinoza Morales  \[facility]\"" Internal Medicine HO-3  \[facility]\"" Med Service Team A    \[facility]\"" Medicine Hospitalist Pager numbers:   \[facility]\"" Hospitalist Medicine Team A (Yahir/Catrachita): [phone]  \[facility]\"" Hospitalist Medicine Team B (Yvette/Karine):  [phone]

## 2023-11-09 NOTE — PLAN OF CARE
Done.   Problem: Patient Care Overview  Goal: Plan of Care Review  Outcome: Ongoing (interventions implemented as appropriate)  Pt. On room air in no apparent distress. Will cont. To monitor.       0

## 2024-06-20 NOTE — HPI
Moises Hernandez is a 82 y.o. male very well known to general surgery service with history of chronic right pneumothorax (at least since May 2017), COPD, and intermittent bowel obstructions who presents for recurrent abdominal pain/bloating, nausea, and vomiting.  Symptoms began ~30 hours ago.  He has had no ostomy output since onset of symptoms.  This is his 4th readmission for the same problem this month.  He has to this point declined TPN and his obstructions have resolved each time without operative intervention.  He reports that over the past week, he had been doing very well.  He was ambulating well around the house and garden, he has been eating 3 good meals a day and supplementing with protein shakes.  His only hiccup last week was that his chest tube with Heimlich valve was accidentally removed at home on Christmas Eve.  He came to the ED to be evaluated and a CXR was stable, so no chest tube was replaced.  He denies fever, chills, chest pain, shortness of breath, blood in stool, bilious/bloody emesis.  He vomited water a couple times yesterday.   VIEW ALL